# Patient Record
Sex: MALE | Race: BLACK OR AFRICAN AMERICAN | Employment: FULL TIME | URBAN - METROPOLITAN AREA
[De-identification: names, ages, dates, MRNs, and addresses within clinical notes are randomized per-mention and may not be internally consistent; named-entity substitution may affect disease eponyms.]

---

## 2017-08-15 ENCOUNTER — HOSPITAL ENCOUNTER (OUTPATIENT)
Dept: LAB | Age: 42
Discharge: HOME | End: 2017-08-15

## 2017-08-15 ENCOUNTER — OFFICE VISIT (OUTPATIENT)
Dept: FAMILY MEDICINE CLINIC | Age: 42
End: 2017-08-15

## 2017-08-15 VITALS
OXYGEN SATURATION: 96 % | DIASTOLIC BLOOD PRESSURE: 81 MMHG | TEMPERATURE: 98.3 F | HEART RATE: 76 BPM | BODY MASS INDEX: 29.48 KG/M2 | RESPIRATION RATE: 16 BRPM | WEIGHT: 222.4 LBS | HEIGHT: 73 IN | SYSTOLIC BLOOD PRESSURE: 127 MMHG

## 2017-08-15 DIAGNOSIS — Z76.89 ENCOUNTER TO ESTABLISH CARE: Primary | ICD-10-CM

## 2017-08-15 DIAGNOSIS — N52.9 ERECTILE DYSFUNCTION, UNSPECIFIED ERECTILE DYSFUNCTION TYPE: ICD-10-CM

## 2017-08-15 DIAGNOSIS — F43.29 STRESS AND ADJUSTMENT REACTION: ICD-10-CM

## 2017-08-15 DIAGNOSIS — Z72.0 TOBACCO ABUSE: ICD-10-CM

## 2017-08-15 DIAGNOSIS — Z76.89 ENCOUNTER TO ESTABLISH CARE: ICD-10-CM

## 2017-08-15 DIAGNOSIS — R06.02 SOB (SHORTNESS OF BREATH): ICD-10-CM

## 2017-08-15 DIAGNOSIS — F11.91 HISTORY OF HEROIN USE: ICD-10-CM

## 2017-08-15 PROBLEM — I10 HTN, GOAL BELOW 140/90: Status: ACTIVE | Noted: 2017-08-15

## 2017-08-15 LAB
ALBUMIN SERPL BCP-MCNC: 4 G/DL (ref 3.4–5)
ALBUMIN/GLOB SERPL: 1.1 {RATIO} (ref 0.8–1.7)
ALP SERPL-CCNC: 113 U/L (ref 45–117)
ALT SERPL-CCNC: 22 U/L (ref 16–61)
AMPHET UR QL SCN: NEGATIVE
ANION GAP BLD CALC-SCNC: 7 MMOL/L (ref 3–18)
APPEARANCE UR: CLEAR
AST SERPL W P-5'-P-CCNC: 16 U/L (ref 15–37)
BARBITURATES UR QL SCN: NEGATIVE
BASOPHILS # BLD AUTO: 0 K/UL (ref 0–0.1)
BASOPHILS # BLD: 0 % (ref 0–2)
BENZODIAZ UR QL: NEGATIVE
BILIRUB SERPL-MCNC: 0.2 MG/DL (ref 0.2–1)
BILIRUB UR QL: NEGATIVE
BUN SERPL-MCNC: 15 MG/DL (ref 7–18)
BUN/CREAT SERPL: 14 (ref 12–20)
CALCIUM SERPL-MCNC: 8.9 MG/DL (ref 8.5–10.1)
CANNABINOIDS UR QL SCN: NEGATIVE
CHLORIDE SERPL-SCNC: 101 MMOL/L (ref 100–108)
CHOLEST SERPL-MCNC: 214 MG/DL
CO2 SERPL-SCNC: 29 MMOL/L (ref 21–32)
COCAINE UR QL SCN: NEGATIVE
COLOR UR: YELLOW
CREAT SERPL-MCNC: 1.06 MG/DL (ref 0.6–1.3)
DIFFERENTIAL METHOD BLD: ABNORMAL
EOSINOPHIL # BLD: 0.6 K/UL (ref 0–0.4)
EOSINOPHIL NFR BLD: 7 % (ref 0–5)
ERYTHROCYTE [DISTWIDTH] IN BLOOD BY AUTOMATED COUNT: 14.1 % (ref 11.6–14.5)
ETHANOL SERPL-MCNC: <3 MG/DL (ref 0–3)
GLOBULIN SER CALC-MCNC: 3.6 G/DL (ref 2–4)
GLUCOSE SERPL-MCNC: 90 MG/DL (ref 74–99)
GLUCOSE UR STRIP.AUTO-MCNC: NEGATIVE MG/DL
HCT VFR BLD AUTO: 35.6 % (ref 36–48)
HDLC SERPL-MCNC: 47 MG/DL (ref 40–60)
HDLC SERPL: 4.6 {RATIO} (ref 0–5)
HDSCOM,HDSCOM: ABNORMAL
HGB BLD-MCNC: 11.6 G/DL (ref 13–16)
HGB UR QL STRIP: NEGATIVE
KETONES UR QL STRIP.AUTO: NEGATIVE MG/DL
LDLC SERPL CALC-MCNC: 112.2 MG/DL (ref 0–100)
LEUKOCYTE ESTERASE UR QL STRIP.AUTO: NEGATIVE
LIPID PROFILE,FLP: ABNORMAL
LYMPHOCYTES # BLD AUTO: 26 % (ref 21–52)
LYMPHOCYTES # BLD: 1.9 K/UL (ref 0.9–3.6)
MAGNESIUM SERPL-MCNC: 2 MG/DL (ref 1.6–2.6)
MCH RBC QN AUTO: 28 PG (ref 24–34)
MCHC RBC AUTO-ENTMCNC: 32.6 G/DL (ref 31–37)
MCV RBC AUTO: 85.8 FL (ref 74–97)
METHADONE UR QL: POSITIVE
MONOCYTES # BLD: 0.5 K/UL (ref 0.05–1.2)
MONOCYTES NFR BLD AUTO: 7 % (ref 3–10)
NEUTS SEG # BLD: 4.5 K/UL (ref 1.8–8)
NEUTS SEG NFR BLD AUTO: 60 % (ref 40–73)
NITRITE UR QL STRIP.AUTO: NEGATIVE
OPIATES UR QL: NEGATIVE
PCP UR QL: NEGATIVE
PH UR STRIP: 5.5 [PH] (ref 5–8)
PLATELET # BLD AUTO: 353 K/UL (ref 135–420)
PMV BLD AUTO: 10.4 FL (ref 9.2–11.8)
POTASSIUM SERPL-SCNC: 3.8 MMOL/L (ref 3.5–5.5)
PROT SERPL-MCNC: 7.6 G/DL (ref 6.4–8.2)
PROT UR STRIP-MCNC: NEGATIVE MG/DL
RBC # BLD AUTO: 4.15 M/UL (ref 4.7–5.5)
SODIUM SERPL-SCNC: 137 MMOL/L (ref 136–145)
SP GR UR REFRACTOMETRY: 1.01 (ref 1–1.03)
TRIGL SERPL-MCNC: 274 MG/DL (ref ?–150)
TSH SERPL DL<=0.05 MIU/L-ACNC: 2.19 UIU/ML (ref 0.36–3.74)
UROBILINOGEN UR QL STRIP.AUTO: 0.2 EU/DL (ref 0.2–1)
VLDLC SERPL CALC-MCNC: 54.8 MG/DL
WBC # BLD AUTO: 7.5 K/UL (ref 4.6–13.2)

## 2017-08-15 PROCEDURE — 87389 HIV-1 AG W/HIV-1&-2 AB AG IA: CPT | Performed by: NURSE PRACTITIONER

## 2017-08-15 PROCEDURE — 83735 ASSAY OF MAGNESIUM: CPT | Performed by: NURSE PRACTITIONER

## 2017-08-15 PROCEDURE — 80307 DRUG TEST PRSMV CHEM ANLYZR: CPT | Performed by: NURSE PRACTITIONER

## 2017-08-15 PROCEDURE — 80053 COMPREHEN METABOLIC PANEL: CPT | Performed by: NURSE PRACTITIONER

## 2017-08-15 PROCEDURE — 84443 ASSAY THYROID STIM HORMONE: CPT | Performed by: NURSE PRACTITIONER

## 2017-08-15 PROCEDURE — 85025 COMPLETE CBC W/AUTO DIFF WBC: CPT | Performed by: NURSE PRACTITIONER

## 2017-08-15 PROCEDURE — 80061 LIPID PANEL: CPT | Performed by: NURSE PRACTITIONER

## 2017-08-15 PROCEDURE — 80074 ACUTE HEPATITIS PANEL: CPT | Performed by: NURSE PRACTITIONER

## 2017-08-15 PROCEDURE — 81003 URINALYSIS AUTO W/O SCOPE: CPT | Performed by: NURSE PRACTITIONER

## 2017-08-15 RX ORDER — ALBUTEROL SULFATE 90 UG/1
2 AEROSOL, METERED RESPIRATORY (INHALATION)
Qty: 1 INHALER | Refills: 0 | Status: SHIPPED | COMMUNITY
Start: 2017-08-15 | End: 2017-12-05 | Stop reason: SDUPTHER

## 2017-08-15 NOTE — PATIENT INSTRUCTIONS
Erectile Dysfunction: Care Instructions  Your Care Instructions  A man has erectile dysfunction (ED) when he routinely can't get or keep an erection that allows satisfactory sex. He may not be able to have an erection at any time. Or he may not be able to have one that is firm enough or lasts long enough to complete intercourse. ED is not the same as having trouble getting an erection now and then. That's common. It happens to most men at some time. ED can be caused by problems with the blood vessels, nerves, or hormones. It can be caused by diabetes, heart disease, and injuries. Nerve disorders, such as multiple sclerosis or Parkinson's disease, can also cause it. ED can also be caused by medicines, alcohol, and tobacco. Or it may be caused by depression, stress, grief, or relationship problems. Follow-up care is a key part of your treatment and safety. Be sure to make and go to all appointments, and call your doctor if you are having problems. It's also a good idea to know your test results and keep a list of the medicines you take. How can you care for yourself at home? Lifestyle  · Limit alcohol. Have no more than 2 drinks a day. · Do not smoke. Smoking makes it harder for the blood vessels in the penis to relax and let blood flow in. If you need help quitting, talk to your doctor about stop-smoking programs and medicines. These can increase your chances of quitting for good. · Do not use cocaine, heroin, or other illegal drugs. · Try to reduce stress. · Give yourself time to adjust to change. Changes in your job, family, relationships, home life, and other areas can cause stress. And stress can cause erection problems. Work with your partner  · Don't assume that you know what your partner likes when it comes to sex. You may be wrong. Talk about what each of you does and does not enjoy. · Make time outside of the bedroom to talk about your sex life.  If you avoid sex because you are afraid of having erection problems, your partner may worry that you are no longer interested. · If you and your partner have trouble talking about sex, see a therapist who can help you talk about it. Reading books with your partner about sexual health may also help. · Relax. Take time for more foreplay. Worrying about your erections may only make things worse. Medicines  · Tell your doctor about all the medicines that you take. ¨ Some medicines can cause erection problems. ¨ Some medicines can have dangerous interactions with medicines that are prescribed for ED, including over-the-counter medicines and herbal products. · Be safe with medicines. Take your medicines exactly as prescribed. Call your doctor if you think you are having a problem with your medicine. · Talk to your doctor about trying a medicine to help you keep an erection. This could be a medicine such as Viagra, Levitra, or Cialis. If you have a heart problem, ask your doctor if these are safe for you. Do not take these medicines if you take nitroglycerin or other nitrate medicine. When should you call for help? Call your doctor now or seek immediate medical care if:  · You have an erection that lasts longer than 3 hours. · You took an erection-enhancing medicine (such as Cialis, Levitra, or Viagra) in the past 24 hours, and you have angina symptoms, such as chest pain or pressure. Do not take nitroglycerin. · You have erection problems along with pain or difficulty with urination, fever, or pain in the lower belly. Watch closely for changes in your health, and be sure to contact your doctor if you have any problems. Where can you learn more? Go to http://jude-kae.info/. Enter [id number] in the search box to learn more about \"Erectile Dysfunction: Care Instructions. \"  Current as of: March 14, 2017  Content Version: 11.3  © 1896-8909 [employer].  Care instructions adapted under license by [employer] (which disclaims liability or warranty for this information). If you have questions about a medical condition or this instruction, always ask your healthcare professional. [place] [address] any warranty or liability for your use of this information. The [facility] reminders! Foundation Operating Hours: These may change without notice. Mon- Wed 7am to 5pm. Closed for lunch 12-1pm  Thurs 7am to 12pm  Fridays closed     NO SHOW POLICY ~ If a patient has 3 no shows for an appointment with the Provider, Mental Health Provider, or the Nurse Navigator in 6 months, they will be discharged from the practice for 6 months. Medication ordering will also be suspended. If the patient is discharged from the [facility], they can go to the [place] [address] where they can be seen for the primary needs plus obtain the same types of medications as they receive at the [facility]. To avoid being discharged, the patient must call the office at [phone] 24 hours prior to their appointment if they need to cancel, arrive to their appointments on time and come to all scheduled appointments. If the patient is discharged from the [facility], they can apply to be re-established after 12 months. Lab work:  Unless you are instructed differently, please return to the office between the hours of 7 am and 10:30 am Monday through Thursday to have your labs drawn one week before your next scheduled PROVIDER visit. If you do not have an appointment to follow up on these results, please make one or plan to call the office if you do not hear from us to get the results. No news does not mean good news. Medications: If your medications are new or have changed, and you get your medications from the clinic pharmacy ([facility]), you MUST talk to the pharmacy staff to sign the new prescription applications. If you don't sign the applications we cannot get the medications for you. It usually takes 6-8 weeks for your medications to arrive. The Pharmacy staff will call you when your medications are available. You will have 30 days to come in and  your medications. If you don't  your medicines within those 30 days, those medicines will be placed on the self as samples and you will have to start all over again by completing the applications and waiting the 6-8 weeks for your medicines to arrive. This is firm and there will be no exceptions! ! The [facility] or [facility] will assist you with your medications if available but not all medications are available so some may be obtained through local pharmacies such as [facility] that has a large $4 list and [facility] that has many drugs for free or also for $4.  We will work hard to get the best medications for you that you can also afford. C Medication pick-up times:  Monday-Tuesday 1:00 -5:00 PM  Wednesday- Thursday 8:00 -11:30 AM      Feet Care: [facility] through [facility]  Every second Tuesday of the month (except for holidays and election days) from 9am to 1 pm. The services provided by these ministry volunteers are free of charge with the option to donate. They will inspect your feet thoroughly, soak them for 10 minutes, cut and file your nails. They care for diabetics as well. Keep in mind this service is free and will be on a first come first serve basis. Bad teeth? Ask about the Dental Bus to get you in front of a local dentist. The bus leaves every other Wednesday for those on the list. (Ask about availability as these appointments are limited)    Eye exams for Diabetics. Please let us know so we can add you to the list to see the eye doctor at [facility]. You will receive a free eye exam and free glasses if needed. Unfortunately, if you are not a diabetic, we do not have a free service for eye exams for you (yet!). We do have information on where to go to get a huge discount on eye exams and glasses. Sick visits:   If you are sick and it is not an emergency call the office to see if you can schedule an appointment. Charges and cost items from the clinic:  Most of our orders are covered by [address] but there ARE SOME CHARGES for items such as radiology interpretations and anesthesiology during procedures and surgeries. Please make sure you have contacted the [employer] Patient [employer]oc[employer] (APA) group to check on your payment options: [email or web address]. or come in and talk to them in person: On  Tuesdays, we have Advanced Patient [employer]ocacy is available Mon - Fri 8-4pm at [facility] on the first floor by the information desk. Their number is [phone]. It is important that you are screened in order to qualify for assistance and to avoid huge medical charges. The [facility] is not responsible for ANY charges you may accrue regardless of who ordered the medication, procedure, treatment or test. If you go to the Emergency Room, you WILL be charged! Behavior and emotional issues! It is stressful to be sick, have an illness, take medications, not have a job, not have medical insurance, have family issues or just getting older! Schedule an appointment with our mental health provider. She is in the office Mondays and Wednesdays from 8am to [address] [facility] can also contact the following: The national suicide hotline (4-470-803-ZWRK or 3-985.596.9354)    [address] [facility], [address] [place] [address]    Walk- in hours Monday -Wednesday   8:30 am -11:15 AM  2:15pm -4:15 pm    [employer] ([facility]) - [facility]  [address], [address]  [phone]        The [place] Department for Aging and [facility], in collaboration with community partners, provides and advocates for resources and services to improve the employment, quality of life, security, and independence of older [address], [address] with disabilities, and their families.     Department for Aging and Rehabilitative Services  Street Location: [address] [place] [address]  [place]   Voice: Aubrie [place] Jania: [phone]  E-mail: [email or web address]      Immunizations/Vaccination  Routine Immunizations are provided for infants, children, teens, and adults at the [facility]. Please bring all immunization records with you and present them at the time of registration. Phone [phone] extension [phone]  Hours (Walk in)  Monday, Tuesday, Wednesday and Friday  8:00 AM to 11:00 AM  12:30 PM to 3:00 PM    Want to Quit Smoking??? Continued tobacco use increases your risks of elevated blood pressure, vascular irritation with increased incidence of cardiovascular with stroke, heart attack, and/or peripheral vascular disease causing claudication. Smoking may also cause lung damage that could lead to COPD, cancer, and death. We encourage you to find a few healthy habits, write them down then plan to decrease your cigarette use by one each week till they are gone all together. Plan for ways to cope with stress for stress may cause you to want to restart. It is imperative to develop new coping mechanisms in order to be successful. 1-800-QUIT-NOW provided for counseling        Drug and Alcohol Addiction Issues! It is hard to stop a poor habit but there is help out there. Please feel free to attend any other the following support groups to help you kick the habit or go to [facility] Emergency Department to be evaluated by the psychiatric team. Never give up!!     AlAnon meetings: [place], [place]    [place] MONDAY 7:30 PM JUST FOR TODAY [employer] [address] 10:30 AM [place] [employer] [place] [employer], ROOM [address] [facility] [address] 8:00  Jeanette Meléndez [address] [place] [address] 8:00 PM KEEP IT SIMPLE [employer] [facility] [address] 8:00 PM LET IT BEGIN WITH ME [facility] [address] [facility] FRIDAY 6;30 PM [place] PAR[facility] [employer] [facility] [address] [facility] 236     [place] MONDAY 10:30 AM [place] [employer] [facility] SATURDAY 8:00 PM [facility] SATURDAY NIGHT AF[employer] [facility] 96 [facility] MONDAY 7:00 PM MONDAY NIGHT AFG Mayur [facility] [address] [facility] WEDNESDAY 8:00 PM [employer] [facility]

## 2017-08-15 NOTE — PROGRESS NOTES
[facility]  [address] [facility], [address]  [phone] office/[phone] fax      8/15/2017    Reason for visit:   Chief Complaint   Patient presents with   [facility]       Patient: Hugh Hunt, 1975, [id number]       Primary MD: Em Del Valle NP    Subjective:   Hugh Hunt, a 36 y.o. male, with pmhx of ADD, HTN, heroin abuse now going to methadone clinic, is right handed presents for [facility]. The patient reports he was referred to use by methadone clinic for primary care. He reports he was incarcerated for 10 years and got out about 1 year ago and is here for a checkup. Per records the patient has h/o HTN, has been off medications > 1 year. Today BP is normal. The patient reports he has made some lifestyle modifications such as limiting sodium and watching diet. Risk factors include Smoking and heroin abuse. The patient reports he \"slipped\" up and last used heroin 1 week ago. The patient also c/o SOB, reports he was using his mothers inhaler with no relief, states :it had steroids in it\". Denies h/o asthma, copd. He is currently an everyday smoker. Also reports intermittent palpitations. Denies LE swelling, LOC, weakness. Intermittent headaches reported. HPI    ADV DIR:  None      HM:   PCP: never   Eye exam: > 1 year ago   Dtap: unknown   Flu: unknown   PNA: unknown      Work status: Employed       Past Medical History:   Diagnosis Date    ADD (attention deficit disorder)     Heroin abuse     Started age 14 years, on methadone clinic     Hypertension        Past Surgical History:   Procedure Laterality Date    HX HERNIA REPAIR      surgery as infant       Social History     Social History    Marital status: SINGLE     Spouse name: N/A    Number of children: N/A    Years of education: GED     Occupational History    Not on file.      Social History Main Topics    Smoking status: Current Every Day Smoker     Packs/day: 0.50 Years: 25.00    Smokeless tobacco: Not on file    Alcohol use 0.6 oz/week     1 Cans of beer per week      Comment: rarely    Drug use: Yes     Special: Heroin    Sexual activity: Not Currently     Partners: Female     Birth control/ protection: None     Other Topics Concern    Exercise No    Seat Belt No     Social History Narrative       No Known Allergies    Current Outpatient Prescriptions on File Prior to Visit   Medication Sig Dispense Refill    multivitamin (ONE A DAY) tablet Take 1 Tab by mouth daily.  lisinopril-hydrochlorothiazide (PRINZIDE, ZESTORETIC) 20-25 mg per tablet Take 1 Tab by mouth daily.  verapamil ER (VERELAN) 180 mg CR capsule Take 180 mg by mouth daily.  hydrocortisone (CORTAID) 0.5 % topical cream Apply  to affected area two (2) times a day. use thin layer       No current facility-administered medications on file prior to visit. Review of Systems   Constitutional: Negative. HENT: Negative. Eyes: Negative. Respiratory: Positive for shortness of breath. Negative for cough, hemoptysis, sputum production and wheezing. Cardiovascular: Positive for palpitations. Negative for chest pain, orthopnea, claudication and leg swelling. Gastrointestinal: Negative. Genitourinary: Negative. Negative for dysuria, flank pain, frequency and hematuria. C/o ED and difficulty ejaculating. For the past year    Musculoskeletal: Negative. Skin: Negative. Neurological: Negative. Endo/Heme/Allergies: Negative. Psychiatric/Behavioral: Positive for substance abuse. Negative for depression, hallucinations, memory loss and suicidal ideas. The patient is not nervous/anxious and does not have insomnia. Reports stress with adjusting to being out of residential. Finances strained with taking care of 6 daughters and fiance. 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         Objective:   Visit Vitals    /81 (BP 1 Location: Right arm, BP Patient Position: Sitting)    Pulse 76    Temp 98.3 °F (36.8 °C)    Resp 16    Ht 6' 1\" (1.854 m)    Wt 222 lb 6.4 oz (100.9 kg)    SpO2 96%    BMI 29.34 kg/m2      Wt Readings from Last 3 Encounters:   08/15/17 222 lb 6.4 oz (100.9 kg)   12/14/15 217 lb (98.4 kg)     Lab Results   Component Value Date/Time    Glucose 81 03/25/2016 07:05 PM         Physical Exam   Constitutional: He is oriented to person, place, and time. He appears well-developed and well-nourished. HENT:   Head: Normocephalic. Eyes: Pupils are equal, round, and reactive to light. Neck: Normal range of motion. Neck supple. No JVD present. Carotid bruit is not present. No thyromegaly present. Cardiovascular: Normal rate and regular rhythm. No murmur heard. Pulmonary/Chest: Effort normal and breath sounds normal. No respiratory distress. He has no wheezes. Abdominal: Soft. Bowel sounds are normal. He exhibits no distension. There is no tenderness. Musculoskeletal: Normal range of motion. He exhibits no edema or deformity. Neurological: He is alert and oriented to person, place, and time. He has normal reflexes. Skin: Skin is warm and dry. Psychiatric: He has a normal mood and affect. His behavior is normal. Judgment and thought content normal.   Vitals reviewed. Assessment:    Harley Monique who has risk factors including (see above previous medical hx) and:       ICD-10-CM ICD-9-CM    1. Encounter to establish care Z76.89 V65.8 DRUG SCREEN, URINE      TSH 3RD GENERATION      LIPID PANEL      HEPATITIS PANEL, ACUTE      CBC WITH AUTOMATED DIFF      METABOLIC PANEL, COMPREHENSIVE      MAGNESIUM      HIV 1/2 AG/AB, 4TH GENERATION,W RFLX CONFIRM      ETHYL ALCOHOL      URINALYSIS W/ RFLX MICROSCOPIC      COLLECTION VENOUS BLOOD,VENIPUNCTURE   2. Tobacco abuse Z72.0 305.1 COLLECTION VENOUS BLOOD,VENIPUNCTURE      albuterol (PROVENTIL HFA, VENTOLIN HFA, PROAIR HFA) 90 mcg/actuation inhaler   3.  Erectile dysfunction, unspecified erectile dysfunction type N52.9 607.84    4. History of heroin use Z86.59 V11.8    5. Stress and adjustment reaction F43.29 309.89    6. SOB (shortness of breath) R06.02 786.05 albuterol (PROVENTIL HFA, VENTOLIN HFA, PROAIR HFA) 90 mcg/actuation inhaler      EKG, 12 LEAD, INITIAL     1. Encounter to establish care  -BP normal, will continue to monitor. Bp issues could have been related to lifestyle and illicit drug use   - DRUG SCREEN, URINE; Future  - TSH 3RD GENERATION; Future  - LIPID PANEL; Future  - HEPATITIS PANEL, ACUTE; Future  - CBC WITH AUTOMATED DIFF; Future  - METABOLIC PANEL, COMPREHENSIVE; Future  - MAGNESIUM; Future  - HIV 1/2 AG/AB, 4TH GENERATION,W RFLX CONFIRM; Future  - ETHYL ALCOHOL; Future  - URINALYSIS W/ RFLX MICROSCOPIC; Future  - COLLECTION VENOUS BLOOD,VENIPUNCTURE    2. Tobacco abuse  -Counseled patient on the dangers of tobacco use, and was advised to quit. Reviewed strategies to maximize success, including the use of Chantix. Discussed the risks of continued tobacco use such as elevated blood pressure, vascular irritation with increased incidence of CVD with stroke or MI and PVD causing claudication, lung damage that could lead to COPD, cancer and death. Encouraged an approach to find a few healthy habits, write them down then plan to decrease their cigarette use by one each week till they are gone all together and to plan for stress that may cause them to want to restart and how to prevent it by having new coping mechanisms in place. 1-800-QUIT-NOW provided for counseling   - COLLECTION VENOUS BLOOD,VENIPUNCTURE  - albuterol (PROVENTIL HFA, VENTOLIN HFA, PROAIR HFA) 90 mcg/actuation inhaler; Take 2 Puffs by inhalation every four (4) hours as needed for Shortness of Breath. Dispense: 1 Inhaler; Refill: 0    3. Erectile dysfunction, unspecified erectile dysfunction type  -Diff DX stress, HLD, illicit drug use, methadone use, metabolic   -Will await lab results     4. History of heroin use  -F/u with methadone clinic    5. Stress and adjustment reaction  -Educated pt on the importance of avoiding or reducing the amt of stress they deal with in a day. Also recommended alone time and exercise to assist with reducing stress. Encouraged pt to follow up with Yuki Live, mental health, NP for counseling to work on problems, develop coping mechanisms and have someone to leave the problems with who could help in resolving daily issues. 6. SOB (shortness of breath)  -Diff Dx asthma, copd, allergies, cardiomyopathy   - albuterol (PROVENTIL HFA, VENTOLIN HFA, PROAIR HFA) 90 mcg/actuation inhaler; Take 2 Puffs by inhalation every four (4) hours as needed for Shortness of Breath. Dispense: 1 Inhaler; Refill: 0  - EKG, 12 LEAD, INITIAL; Future      Written instructions followed our verbal discussion of all information discussed above, pending tests ordered and future goals/plans. Patient expressed understanding of current diagnosis, planned testing, follow up and if needed to contact the office for any questions or concerns prior to the next visit. Plan:   Reviewed medication and completed the medication reconciliation with the patient. Reviewed side effects of medications with the patient. Questions were answered and patient verb understanding. Pt is a 37 yo AA male. See Med  for details. Pt in the office today to establish care, medication reconciliation . Labs obtained to establish baseline, evaluate metabolic health, nutritional status, vitamin deficiencies and screening for at risk items based on the demographics of the patient, previous medical history and current social practices.  Will contact the patient in when all labs are resulted by phone to review and make lifestyle and medication recommendations. Follow up labs will be completed to monitor improvement prior to their next visit.       Orders Placed This Encounter    COLLECTION VENOUS BLOOD,VENIPUNCTURE    DRUG SCREEN, URINE     Standing Status:   Future     Standing Expiration Date:   2/14/2018    TSH 3RD GENERATION     Standing Status:   Future     Standing Expiration Date:   2/14/2018    LIPID PANEL     Standing Status:   Future     Standing Expiration Date:   8/16/2018    HEPATITIS PANEL, ACUTE     Standing Status:   Future     Standing Expiration Date:   2/14/2018    CBC WITH AUTOMATED DIFF     Standing Status:   Future     Standing Expiration Date:   7/73/0025    METABOLIC PANEL, COMPREHENSIVE     Standing Status:   Future     Standing Expiration Date:   2/14/2018    MAGNESIUM     Standing Status:   Future     Standing Expiration Date:   2/12/2018    HIV 1/2 AG/AB, 4TH GENERATION,W RFLX CONFIRM     Standing Status:   Future     Standing Expiration Date:   2/14/2018    ETHYL ALCOHOL     Standing Status:   Future     Standing Expiration Date:   2/14/2018    URINALYSIS W/ RFLX MICROSCOPIC     Standing Status:   Future     Standing Expiration Date:   2/15/2018    EKG, 12 LEAD, INITIAL     Standing Status:   Future     Standing Expiration Date:   2/12/2018     Order Specific Question:   Reason for Exam:     Answer:   c/o sob, new patient    albuterol (PROVENTIL HFA, VENTOLIN HFA, PROAIR HFA) 90 mcg/actuation inhaler     Sig: Take 2 Puffs by inhalation every four (4) hours as needed for Shortness of Breath. Dispense:  1 Inhaler     Refill:  0     Current Outpatient Prescriptions   Medication Sig Dispense Refill    albuterol (PROVENTIL HFA, VENTOLIN HFA, PROAIR HFA) 90 mcg/actuation inhaler Take 2 Puffs by inhalation every four (4) hours as needed for Shortness of Breath. 1 Inhaler 0    multivitamin (ONE A DAY) tablet Take 1 Tab by mouth daily.  lisinopril-hydrochlorothiazide (PRINZIDE, ZESTORETIC) 20-25 mg per tablet Take 1 Tab by mouth daily.  verapamil ER (VERELAN) 180 mg CR capsule Take 180 mg by mouth daily.       hydrocortisone (CORTAID) 0.5 % topical cream Apply  to affected area two (2) times a day. use thin layer         Follow-up Disposition:  Return in about 4 weeks (around 9/12/2017), or if symptoms worsen or fail to improve. See [employer] for financial assistance  Labs needed for follow-up appt     \"No Show policy was reviewed with the patient. The services affected are the nurse navigator and the provider. No show appointments include missing labs for a future scheduled appointment, Pap/pelvics, arriving to appointment more than 10 minutes late, and calling to cancel appointment less than 24 hours in advance. After the 3rd No Show, the patient will be removed from the [facility] to include medications for 6 months. The patient will be referred to the [place] [id number] for their primary care needs. \"     Daniela Alcala, ERMA, RN, [facility]    I spent 35 minutes with the patient in face-to-face consultation, of which greater than 50% was spent in counseling and coordination of care as described above.

## 2017-08-15 NOTE — MR AVS SNAPSHOT
Visit Information Date & Time Provider Department Dept. Phone Encounter #  
 8/15/2017  1:30 PM Jovan Soria NP 1997 [facility] [id number] Follow-up Instructions Return in about 4 weeks (around 9/12/2017), or if symptoms worsen or fail to improve. Your Appointments 8/23/2017  3:00 PM  
CONSULT with NURSE NAVIGATOR [address] ([facility]) Appt Note: NPO/Labs  
 340 [facility] [address]  
129 [facility] [address]  
  
    
 9/12/2017  3:00 PM  
Follow Up with Jovan Soria NP [address] [place] [address] ([facility]) Appt Note: 1 mth follow up/ No labs needed 340 [facility] [id number]  
1229 [facility] [id number] Upcoming Health Maintenance Date Due Pneumococcal 19-64 Medium Risk (1 of 1 - PPSV23) 7/4/1993 DTaP/Tdap/Td series (1 - Tdap) 7/4/1995 INFLUENZA AGE 9 TO ADULT 8/1/2017 Allergies as of 8/15/2017  Review Complete On: 8/15/2017 By: Raúl Corona RN No Known Allergies Current Immunizations  Never Reviewed No immunizations on file. Not reviewed this visit You Were Diagnosed With   
  
 Codes Comments Encounter to establish care    -  Primary ICD-10-CM: Z76.89 
ICD-9-CM: V65.8 Tobacco abuse     ICD-10-CM: Z72.0 ICD-9-CM: 305.1 Erectile dysfunction, unspecified erectile dysfunction type     ICD-10-CM: N52.9 ICD-9-CM: 607.84 History of heroin use     ICD-10-CM: Z86.59 
ICD-9-CM: V11.8 Stress and adjustment reaction     ICD-10-CM: F43.29 ICD-9-CM: 309.89   
 SOB (shortness of breath)     ICD-10-CM: R06.02 
ICD-9-CM: 786.05 Vitals BP Pulse Temp Resp Height(growth percentile) Weight(growth percentile) 127/81 (BP 1 Location: Right arm, BP Patient Position: Sitting) 76 98.3 °F (36.8 °C) 16 6' 1\" (1.854 m) 222 lb 6.4 oz (100.9 kg) SpO2 BMI Smoking Status 96% 29.34 kg/m2 Current Every Day Smoker Vitals History BMI and BSA Data Body Mass Index Body Surface Area  
 29.34 kg/m 2 2.28 m 2 Your Updated Medication List  
  
   
This list is accurate as of: 8/15/17  2:10 PM.  Always use your most recent med list.  
  
  
  
  
 albuterol 90 mcg/actuation inhaler Commonly known as:  PROVENTIL HFA, VENTOLIN HFA, PROAIR HFA Take 2 Puffs by inhalation every four (4) hours as needed for Shortness of Breath. hydrocortisone 0.5 % topical cream  
Commonly known as:  CORTAID Apply  to affected area two (2) times a day. use thin layer  
  
 lisinopril-hydroCHLOROthiazide 20-25 mg per tablet Commonly known as:  Gweneth Rasher Take 1 Tab by mouth daily. multivitamin tablet Commonly known as:  ONE A DAY Take 1 Tab by mouth daily. verapamil  mg CR capsule Commonly known as:  Frankel Coffin Take 180 mg by mouth daily. We Performed the Following COLLECTION VENOUS BLOOD,VENIPUNCTURE [id number] CPT(R)] Follow-up Instructions Return in about 4 weeks (around 9/12/2017), or if symptoms worsen or fail to improve. To-Do List   
 08/29/2017 ECG:  EKG, 12 LEAD, INITIAL Patient Instructions Erectile Dysfunction: Care Instructions Your Care Instructions A man has erectile dysfunction (ED) when he routinely can't get or keep an erection that allows satisfactory sex. He may not be able to have an erection at any time. Or he may not be able to have one that is firm enough or lasts long enough to complete intercourse. ED is not the same as having trouble getting an erection now and then. That's common. It happens to most men at some time. ED can be caused by problems with the blood vessels, nerves, or hormones. It can be caused by diabetes, heart disease, and injuries. Nerve disorders, such as multiple sclerosis or Parkinson's disease, can also cause it. ED can also be caused by medicines, alcohol, and tobacco. Or it may be caused by depression, stress, grief, or relationship problems. Follow-up care is a key part of your treatment and safety. Be sure to make and go to all appointments, and call your doctor if you are having problems. It's also a good idea to know your test results and keep a list of the medicines you take. How can you care for yourself at home? Lifestyle · Limit alcohol. Have no more than 2 drinks a day. · Do not smoke. Smoking makes it harder for the blood vessels in the penis to relax and let blood flow in. If you need help quitting, talk to your doctor about stop-smoking programs and medicines. These can increase your chances of quitting for good. · Do not use cocaine, heroin, or other illegal drugs. · Try to reduce stress. · Give yourself time to adjust to change. Changes in your job, family, relationships, home life, and other areas can cause stress. And stress can cause erection problems. Work with your partner · Don't assume that you know what your partner likes when it comes to sex. You may be wrong. Talk about what each of you does and does not enjoy. · Make time outside of the bedroom to talk about your sex life. If you avoid sex because you are afraid of having erection problems, your partner may worry that you are no longer interested. · If you and your partner have trouble talking about sex, see a therapist who can help you talk about it. Reading books with your partner about sexual health may also help. · Relax. Take time for more foreplay. Worrying about your erections may only make things worse. Medicines · Tell your doctor about all the medicines that you take. ¨ Some medicines can cause erection problems. ¨ Some medicines can have dangerous interactions with medicines that are prescribed for ED, including over-the-counter medicines and herbal products. · Be safe with medicines. Take your medicines exactly as prescribed. Call your doctor if you think you are having a problem with your medicine. · Talk to your doctor about trying a medicine to help you keep an erection. This could be a medicine such as Viagra, Levitra, or Cialis. If you have a heart problem, ask your doctor if these are safe for you. Do not take these medicines if you take nitroglycerin or other nitrate medicine. When should you call for help? Call your doctor now or seek immediate medical care if: 
· You have an erection that lasts longer than 3 hours. · You took an erection-enhancing medicine (such as Cialis, Levitra, or Viagra) in the past 24 hours, and you have angina symptoms, such as chest pain or pressure. Do not take nitroglycerin. · You have erection problems along with pain or difficulty with urination, fever, or pain in the lower belly. Watch closely for changes in your health, and be sure to contact your doctor if you have any problems. Where can you learn more? Go to http://jude-kae.info/. Enter [phone] in the search box to learn more about \"Erectile Dysfunction: Care Instructions. \" Current as of: March 14, 2017 Content Version: 11.3 © 1265-6368 [employer]. Care instructions adapted under license by [employer] (which disclaims liability or warranty for this information). If you have questions about a medical condition or this instruction, always ask your healthcare professional. [place] [address] any warranty or liability for your use of this information. [facility] reminders! Foundation Operating Hours: These may change without notice. Mon- Wed 7am to 5pm. Closed for lunch 12-1pm 
Thurs 7am to 12pm 
Fridays closed NO SHOW POLICY ~ If a patient has 3 no shows for an appointment with the Provider, Mental Health Provider, or the Nurse Navigator in 6 months, they will be discharged from the practice for 6 months. Medication ordering will also be suspended. If the patient is discharged from the [facility], they can go to the [place] [address] where they can be seen for the primary needs plus obtain the same types of medications as they receive at the [facility]. To avoid being discharged, the patient must call the office at [phone] 24 hours prior to their appointment if they need to cancel, arrive to their appointments on time and come to all scheduled appointments. If the patient is discharged from the [facility], they can apply to be re-established after 12 months. Lab work:  Unless you are instructed differently, please return to the office between the hours of 7 am and 10:30 am Monday through Thursday to have your labs drawn one week before your next scheduled PROVIDER visit. If you do not have an appointment to follow up on these results, please make one or plan to call the office if you do not hear from us to get the results. No news does not mean good news. Medications: If your medications are new or have changed, and you get your medications from the clinic pharmacy ([facility]), you MUST talk to the pharmacy staff to sign the new prescription applications. If you don't sign the applications we cannot get the medications for you. It usually takes 6-8 weeks for your medications to arrive. The Pharmacy staff will call you when your medications are available. You will have 30 days to come in and  your medications. If you don't  your medicines within those 30 days, those medicines will be placed on the self as samples and you will have to start all over again by completing the applications and waiting the 6-8 weeks for your medicines to arrive.  This is firm and there will be no exceptions!! 
 
 The [facility] or [facility] will assist you with your medications if available but not all medications are available so some may be obtained through local pharmacies such as [facility] that has a large $4 list and [facility] that has many drugs for free or also for $4.  We will work hard to get the best medications for you that you can also afford. [facility] Medication pick-up times: 
Monday-Tuesday 1:00 -5:00 PM 
Wednesday- Thursday 8:00 -11:30 AM 
 
 
Feet Care: Local [facility] through [facility] Every second Tuesday of the month (except for holidays and election days) from 9am to 1 pm. The services provided by these ministry volunteers are free of charge with the option to donate. They will inspect your feet thoroughly, soak them for 10 minutes, cut and file your nails. They care for diabetics as well. Keep in mind this service is free and will be on a first come first serve basis. Bad teeth? Ask about the Dental Bus to get you in front of a local dentist. The bus leaves every other Wednesday for those on the list. (Ask about availability as these appointments are limited) Eye exams for Diabetics. Please let us know so we can add you to the list to see the eye doctor at [facility]. You will receive a free eye exam and free glasses if needed. Unfortunately, if you are not a diabetic, we do not have a free service for eye exams for you (yet!). We do have information on where to go to get a huge discount on eye exams and glasses. Sick visits: If you are sick and it is not an emergency call the office to see if you can schedule an appointment. Charges and cost items from the clinic:  Most of our orders are covered by [address] but there ARE SOME CHARGES for items such as radiology interpretations and anesthesiology during procedures and surgeries. Please make sure you have contacted the Advanced Patient Advocacy (APA) group to check on your payment options: [email or web address]. [email or web address]. or come in and talk to them in person: On 
Tuesdays, we have Advanced Patient Advocacy is available Mon - Fri 8-4pm at [facility] on the first floor by the information desk. Their number is [phone]. It is important that you are screened in order to qualify for assistance and to avoid huge medical charges. The [facility] is not responsible for ANY charges you may accrue regardless of who ordered the medication, procedure, treatment or test. If you go to the Emergency Room, you WILL be charged! Behavior and emotional issues! It is stressful to be sick, have an illness, take medications, not have a job, not have medical insurance, have family issues or just getting older! Schedule an appointment with our mental health provider. She is in the office Mondays and Wednesdays from 8am to [place] [address] can also contact the following: The national suicide hotline (4-750-169-QHWD or 1-753.477.7105) [facility] [address] Walk- in hours Monday -Wednesday 8:30 am -11:15 AM 
2:15pm -4:15 pm 
 
[employer] (CSB) - [place] 14[address] [facility], [address] [place] [address]  
[phone] The [address] [place] [address] for Aging and The [facility], in collaboration with community partners, provides and advocates for resources and services to improve the employment, quality of life, security, and independence of older [address], [address] with disabilities, and their families. Department for Aging and Rehabilitative Services Street Location: [address] [facility] [place] Voice: [phone] Toll Free Number:[phone] Toll Free TTY: [phone] E-mail: [email or web address] Immunizations/Vaccination Routine Immunizations are provided for infants, children, teens, and adults at the [facility] FOR PHYSICAL REHABILITATION.  Please bring all immunization records with you and present them at the time of registration. Phone [phone] Hours (Walk in) Monday, Tuesday, Wednesday and Friday 8:00 AM to 11:00 AM 
12:30 PM to 3:00 PM 
 
Want to Quit Smoking??? Continued tobacco use increases your risks of elevated blood pressure, vascular irritation with increased incidence of cardiovascular with stroke, heart attack, and/or peripheral vascular disease causing claudication. Smoking may also cause lung damage that could lead to COPD, cancer, and death. We encourage you to find a few healthy habits, write them down then plan to decrease your cigarette use by one each week till they are gone all together. Plan for ways to cope with stress for stress may cause you to want to restart. It is imperative to develop new coping mechanisms in order to be successful. 1-800-QUIT-NOW provided for counseling Drug and Alcohol Addiction Issues! It is hard to stop a poor habit but there is help out there. Please feel free to attend any other the following support groups to help you kick the habit or go to [facility] Emergency Department to be evaluated by the psychiatric team. Never give up!! Karthikeyan meetings: Brandy [place], [place], [place] MONDAY 7:30 PM JUST FOR TODAY [employer] [facility] [place] [address] [facility] [place] WEDNESDAY 10:30 AM NEW BEGINN[place] [employer] [place] [employer], ROOM [address] [place] [address]  
 
[facility]EDNES[facility]:00  Jeanette Castro [facility] THURSDAY 8:00 PM KEEP IT SIMPLE [employer] May[facility] [place] [address] 8:00 PM LET IT BEGIN WITH ME [employer] [facility] 43[address] [facility] FRIDAY 6;30 PM [place] PARENTS [employer] Parents [facility] 472 DAYAN LOPEZ [facility]  Elder [place] 10:30 AM Antony Pacheco Bernadette Maradiaga [place] SATURDAY 8:00 PM GRICELDA [facility] SATURDAY NIGHT AFG [place] [address] [facility] MONDAY 7:00 PM MONDAY NIGHT AFG Al-Anon GURINDER [facility] [address]  
 
[facility] WEDNES[facility]:00 PM SERENITY SEEKERS [employer] Al-[facility] [address] [facility] & HEALTH SERVICES! [facility] introduces Stamp.it patient portal. Now you can access parts of your medical record, email your doctor's office, and request medication refills online. 1. In your internet browser, go to [email or web address]. [email or web address] 2. Click on the First Time User? Click Here link in the Sign In box. You will see the New Member Sign Up page. 3. Enter your Stamp.it Access Code exactly as it appears below. You will not need to use this code after youve completed the sign-up process. If you do not sign up before the expiration date, you must request a new code. · Stamp.it Access Code: [email or web address] Expires: 11/13/2017 12:59 PM 
 
4. Enter the last four digits of your Social Security Number (xxxx) and Date of Birth (mm/dd/yyyy) as indicated and click Submit. You will be taken to the next sign-up page. 5. Create a Stamp.it ID. This will be your Stamp.it login ID and cannot be changed, so think of one that is secure and easy to remember. 6. Create a Stamp.it password. You can change your password at any time. 7. Enter your Password Reset Question and Answer. This can be used at a later time if you forget your password. 8. Enter your e-mail address. You will receive e-mail notification when new information is available in [address] 9. Click Sign Up. You can now view and download portions of your medical record. 10. Click the Download Summary menu link to download a portable copy of your medical information.  
 
If you have questions, please visit the Frequently Asked Questions section of the [employer]. Remember, [employer] is NOT to be used for urgent needs. For medical emergencies, dial 911. Now available from your iPhone and Android! Please provide this summary of care documentation to your next provider. Your primary care clinician is listed as Leander Longo. If you have any questions after today's visit, please call [phone].

## 2017-08-15 NOTE — PROGRESS NOTES
Labs drawn on first attempt in [facility]. Three identifiers used for confirmation: name,  and last of SSN. Lab orders verified.

## 2017-08-15 NOTE — LETTER
8/15/2017 [facility] [address] [place], [address] [facility], 1975, is picking up the following medications ordered from the [facility] Program: STOCK:  MICHAEL HFA #1 Stephenie Leos Patient's Signature: _____________________________ Today's Date: 8/15/2017

## 2017-08-16 LAB
HAV IGM SERPL QL IA: NEGATIVE
HBV CORE IGM SER QL: NEGATIVE
HBV SURFACE AG SER QL: <0.1 INDEX
HBV SURFACE AG SER QL: NEGATIVE
HCV AB SER IA-ACNC: 0.12 INDEX
HCV AB SERPL QL IA: NEGATIVE
HCV COMMENT,HCGAC: NORMAL
HIV 1+2 AB+HIV1 P24 AG SERPL QL IA: NONREACTIVE
HIV12 RESULT COMMENT, HHIVC: NORMAL
SP1: NORMAL
SP2: NORMAL
SP3: NORMAL

## 2017-08-17 NOTE — PROGRESS NOTES
New patient Labs reviewed with the following interventions:  [place],   Please review at 8/23 appt  1) Cholesterol elevated at 214. ASCVD risck < 5% and not in rec statin therapy group.  Recommend diet and lifestyle modifications   2) CMP ok   3) Anemia improved from 1 year ago and stable   4) Hepatitis negative   5) HIV non reactive

## 2017-08-29 ENCOUNTER — TELEPHONE (OUTPATIENT)
Dept: FAMILY MEDICINE CLINIC | Age: 42
End: 2017-08-29

## 2017-08-29 NOTE — TELEPHONE ENCOUNTER
----- Message from Twila Linares NP sent at 8/17/2017  3:25 PM EDT -----  New patient Labs reviewed with the following interventions:  [place],   Please review at 8/23 appt  1) Cholesterol elevated at 214. ASCVD risck < 5% and not in rec statin therapy group.  Recommend diet and lifestyle modifications   2) CMP ok   3) Anemia improved from 1 year ago and stable   4) Hepatitis negative   5) HIV non reactive

## 2017-08-30 NOTE — PROGRESS NOTES
Patient came into office for results after numerous messages left on VM. Results given to patient as requested.

## 2017-09-12 ENCOUNTER — OFFICE VISIT (OUTPATIENT)
Dept: FAMILY MEDICINE CLINIC | Age: 42
End: 2017-09-12

## 2017-09-12 VITALS
HEART RATE: 60 BPM | OXYGEN SATURATION: 96 % | TEMPERATURE: 98.2 F | SYSTOLIC BLOOD PRESSURE: 144 MMHG | RESPIRATION RATE: 12 BRPM | DIASTOLIC BLOOD PRESSURE: 98 MMHG | BODY MASS INDEX: 28.73 KG/M2 | WEIGHT: 216.8 LBS | HEIGHT: 73 IN

## 2017-09-12 DIAGNOSIS — Z72.0 TOBACCO USE: ICD-10-CM

## 2017-09-12 DIAGNOSIS — N52.9 ERECTILE DYSFUNCTION, UNSPECIFIED ERECTILE DYSFUNCTION TYPE: ICD-10-CM

## 2017-09-12 DIAGNOSIS — E78.2 MIXED HYPERLIPIDEMIA: ICD-10-CM

## 2017-09-12 DIAGNOSIS — R06.02 SOB (SHORTNESS OF BREATH): ICD-10-CM

## 2017-09-12 DIAGNOSIS — R39.9 SYMPTOMS INVOLVING URINARY SYSTEM: ICD-10-CM

## 2017-09-12 DIAGNOSIS — I10 HTN, GOAL BELOW 140/90: Primary | ICD-10-CM

## 2017-09-12 DIAGNOSIS — R93.89 ABNORMAL ULTRASOUND OF PROSTATE: ICD-10-CM

## 2017-09-12 RX ORDER — AMLODIPINE BESYLATE AND ATORVASTATIN CALCIUM 5; 10 MG/1; MG/1
1 TABLET, FILM COATED ORAL DAILY
Qty: 90 TAB | Refills: 3 | Status: SHIPPED | COMMUNITY
Start: 2017-09-12 | End: 2019-04-29 | Stop reason: ALTCHOICE

## 2017-09-12 RX ORDER — AMLODIPINE BESYLATE AND ATORVASTATIN CALCIUM 5; 10 MG/1; MG/1
1 TABLET, FILM COATED ORAL DAILY
Qty: 90 TAB | Refills: 0 | Status: SHIPPED | COMMUNITY
Start: 2017-09-12 | End: 2017-10-10 | Stop reason: SDUPTHER

## 2017-09-12 NOTE — PATIENT INSTRUCTIONS
Erectile Dysfunction: Care Instructions  Your Care Instructions  A man has erectile dysfunction (ED) when he routinely can't get or keep an erection that allows satisfactory sex. He may not be able to have an erection at any time. Or he may not be able to have one that is firm enough or lasts long enough to complete intercourse. ED is not the same as having trouble getting an erection now and then. That's common. It happens to most men at some time. ED can be caused by problems with the blood vessels, nerves, or hormones. It can be caused by diabetes, heart disease, and injuries. Nerve disorders, such as multiple sclerosis or Parkinson's disease, can also cause it. ED can also be caused by medicines, alcohol, and tobacco. Or it may be caused by depression, stress, grief, or relationship problems. Follow-up care is a key part of your treatment and safety. Be sure to make and go to all appointments, and call your doctor if you are having problems. It's also a good idea to know your test results and keep a list of the medicines you take. How can you care for yourself at home? Lifestyle  · Limit alcohol. Have no more than 2 drinks a day. · Do not smoke. Smoking makes it harder for the blood vessels in the penis to relax and let blood flow in. If you need help quitting, talk to your doctor about stop-smoking programs and medicines. These can increase your chances of quitting for good. · Do not use cocaine, heroin, or other illegal drugs. · Try to reduce stress. · Give yourself time to adjust to change. Changes in your job, family, relationships, home life, and other areas can cause stress. And stress can cause erection problems. Work with your partner  · Don't assume that you know what your partner likes when it comes to sex. You may be wrong. Talk about what each of you does and does not enjoy. · Make time outside of the bedroom to talk about your sex life.  If you avoid sex because you are afraid of having erection problems, your partner may worry that you are no longer interested. · If you and your partner have trouble talking about sex, see a therapist who can help you talk about it. Reading books with your partner about sexual health may also help. · Relax. Take time for more foreplay. Worrying about your erections may only make things worse. Medicines  · Tell your doctor about all the medicines that you take. ¨ Some medicines can cause erection problems. ¨ Some medicines can have dangerous interactions with medicines that are prescribed for ED, including over-the-counter medicines and herbal products. · Be safe with medicines. Take your medicines exactly as prescribed. Call your doctor if you think you are having a problem with your medicine. · Talk to your doctor about trying a medicine to help you keep an erection. This could be a medicine such as Viagra, Levitra, or Cialis. If you have a heart problem, ask your doctor if these are safe for you. Do not take these medicines if you take nitroglycerin or other nitrate medicine. When should you call for help? Call your doctor now or seek immediate medical care if:  · You have an erection that lasts longer than 3 hours. · You took an erection-enhancing medicine (such as Cialis, Levitra, or Viagra) in the past 24 hours, and you have angina symptoms, such as chest pain or pressure. Do not take nitroglycerin. · You have erection problems along with pain or difficulty with urination, fever, or pain in the lower belly. Watch closely for changes in your health, and be sure to contact your doctor if you have any problems. Where can you learn more? Go to http://jude-kae.info/. Enter [phone] [phone] in the search box to learn more about \"Erectile Dysfunction: Care Instructions. \"  Current as of: March 14, 2017  Content Version: 11.3  © 6453-9735 [employer].  Care instructions adapted under license by [employer] (which disclaims liability or warranty for this information). If you have questions about a medical condition or this instruction, always ask your healthcare professional. [address] any warranty or liability for your use of this information.

## 2017-09-12 NOTE — MR AVS SNAPSHOT
Visit Information Date & Time Provider Department Dept. Phone Encounter #  
 9/12/2017  3:00 PM Sherlyn Sanchez NP [address] [facility] [id number] Follow-up Instructions Return in about 1 month (around 10/12/2017), or if symptoms worsen or fail to improve. Your Appointments 10/10/2017  2:00 PM  
Follow Up with Sehrlyn Sanchez NP [address] [facility] ([facility]) Appt Note: 1 month follow up/ No labs needed [address] [facility] [id number]  
[address] [facility] [id number] Upcoming Health Maintenance Date Due Pneumococcal 19-64 Medium Risk (1 of 1 - PPSV23) 7/4/1993 DTaP/Tdap/Td series (1 - Tdap) 7/4/1995 INFLUENZA AGE 9 TO ADULT 8/1/2017 Allergies as of 9/12/2017  Review Complete On: 9/12/2017 By: Sandi Ornelas No Known Allergies Current Immunizations  Never Reviewed No immunizations on file. Not reviewed this visit You Were Diagnosed With   
  
 Codes Comments HTN, goal below 140/90    -  Primary ICD-10-CM: I10 
ICD-9-CM: 401.9 Mixed hyperlipidemia     ICD-10-CM: E78.2 ICD-9-CM: 272.2 SOB (shortness of breath)     ICD-10-CM: R06.02 
ICD-9-CM: 786.05 Tobacco use     ICD-10-CM: Z72.0 ICD-9-CM: 305.1 Symptoms involving urinary system     ICD-10-CM: R39.9 ICD-9-CM: 788.99 Erectile dysfunction, unspecified erectile dysfunction type     ICD-10-CM: N52.9 ICD-9-CM: 607.84 Vitals BP Pulse Temp Resp Height(growth percentile) Weight(growth percentile) (!) 144/98 60 98.2 °F (36.8 °C) 12 6' 1\" (1.854 m) 216 lb 12.8 oz (98.3 kg) SpO2 BMI Smoking Status 96% 28.6 kg/m2 Current Every Day Smoker Vitals History BMI and BSA Data Body Mass Index Body Surface Area  
 28.6 kg/m 2 2.25 m 2 Your Updated Medication List  
  
   
 This list is accurate as of: 9/12/17  4:04 PM.  Always use your most recent med list.  
  
  
  
  
 albuterol 90 mcg/actuation inhaler Commonly known as:  PROVENTIL HFA, VENTOLIN HFA, PROAIR HFA Take 2 Puffs by inhalation every four (4) hours as needed for Shortness of Breath. * amLODIPine-atorvastatin 5-10 mg per tablet Commonly known as:  CADUET Take 1 Tab by mouth daily. * amLODIPine-atorvastatin 5-10 mg per tablet Commonly known as:  CADUET Take 1 Tab by mouth daily. hydrocortisone 0.5 % topical cream  
Commonly known as:  CORTAID Apply  to affected area two (2) times a day. use thin layer  
  
 multivitamin tablet Commonly known as:  ONE A DAY Take 1 Tab by mouth daily. * Notice: This list has 2 medication(s) that are the same as other medications prescribed for you. Read the directions carefully, and ask your doctor or other care provider to review them with you. Prescriptions Printed Refills  
 amLODIPine-atorvastatin (CADUET) 5-10 mg per tablet 3 Sig: Take 1 Tab by mouth daily. Class: Program  
 Route: Oral  
  
Prescriptions Sent to Mail Order Refills  
 amLODIPine-atorvastatin (CADUET) 5-10 mg per tablet 3 Sig: Take 1 Tab by mouth daily. Class: Program  
 Route: Oral  
  
Prescriptions Sent to Pharmacy Refills  
 amLODIPine-atorvastatin (CADUET) 5-10 mg per tablet 3 Sig: Take 1 Tab by mouth daily. Class: Program  
 Route: Oral  
  
Follow-up Instructions Return in about 1 month (around 10/12/2017), or if symptoms worsen or fail to improve. To-Do List   
 09/12/2017 PFT:  PULMONARY FUNCTION TEST   
  
 09/12/2017 Imaging:  US PELV NON OBS   
  
 09/20/2017 2:00 PM  
  Appointment with [address] [facility] 1 at [address] [place] [address] ([phone]) OUTSIDE FILMS  - Any outside films related to the study being scheduled should be brought with you on the day of the exam.  If this cannot be done there may be a delay in the reading of the study. MEDICATIONS  - Patient must bring a complete list of all medications currently taking to include prescriptions, over-the-counter meds, herbals, vitamins & any dietary supplements  GENERAL -Patient must drink 32 ounces of water 1 hour prior to the exam.  
  
 09/20/2017 3:00 PM  
  Appointment with [address] [place] [address] at [address] ([phone]) 1. Do NOT use any inhaled medications 24 hours prior to your breathing test.   2. Do NOT eat a heavy meal or smoke any tobacco products two hours prior to the appointment time. 3. Dress in loose, comfortable clothing. 4. Bring your photo ID, insurance cards and, if provided, the written physician order. 5. Report to the Patient Registration department on the first floor 15-20 minutes prior to your appointment time to check in.   6. If you have questions or need to reschedule, please call [phone]. Patient Instructions Erectile Dysfunction: Care Instructions Your Care Instructions A man has erectile dysfunction (ED) when he routinely can't get or keep an erection that allows satisfactory sex. He may not be able to have an erection at any time. Or he may not be able to have one that is firm enough or lasts long enough to complete intercourse. ED is not the same as having trouble getting an erection now and then. That's common. It happens to most men at some time. ED can be caused by problems with the blood vessels, nerves, or hormones. It can be caused by diabetes, heart disease, and injuries. Nerve disorders, such as multiple sclerosis or Parkinson's disease, can also cause it. ED can also be caused by medicines, alcohol, and tobacco. Or it may be caused by depression, stress, grief, or relationship problems. Follow-up care is a key part of your treatment and safety.  Be sure to make and go to all appointments, and call your doctor if you are having problems. It's also a good idea to know your test results and keep a list of the medicines you take. How can you care for yourself at home? Lifestyle · Limit alcohol. Have no more than 2 drinks a day. · Do not smoke. Smoking makes it harder for the blood vessels in the penis to relax and let blood flow in. If you need help quitting, talk to your doctor about stop-smoking programs and medicines. These can increase your chances of quitting for good. · Do not use cocaine, heroin, or other illegal drugs. · Try to reduce stress. · Give yourself time to adjust to change. Changes in your job, family, relationships, home life, and other areas can cause stress. And stress can cause erection problems. Work with your partner · Don't assume that you know what your partner likes when it comes to sex. You may be wrong. Talk about what each of you does and does not enjoy. · Make time outside of the bedroom to talk about your sex life. If you avoid sex because you are afraid of having erection problems, your partner may worry that you are no longer interested. · If you and your partner have trouble talking about sex, see a therapist who can help you talk about it. Reading books with your partner about sexual health may also help. · Relax. Take time for more foreplay. Worrying about your erections may only make things worse. Medicines · Tell your doctor about all the medicines that you take. ¨ Some medicines can cause erection problems. ¨ Some medicines can have dangerous interactions with medicines that are prescribed for ED, including over-the-counter medicines and herbal products. · Be safe with medicines. Take your medicines exactly as prescribed. Call your doctor if you think you are having a problem with your medicine. · Talk to your doctor about trying a medicine to help you keep an erection. This could be a medicine such as Viagra, Levitra, or Cialis.  If you have a heart problem, ask your doctor if these are safe for you. Do not take these medicines if you take nitroglycerin or other nitrate medicine. When should you call for help? Call your doctor now or seek immediate medical care if: 
· You have an erection that lasts longer than 3 hours. · You took an erection-enhancing medicine (such as Cialis, Levitra, or Viagra) in the past 24 hours, and you have angina symptoms, such as chest pain or pressure. Do not take nitroglycerin. · You have erection problems along with pain or difficulty with urination, fever, or pain in the lower belly. Watch closely for changes in your health, and be sure to contact your doctor if you have any problems. Where can you learn more? Go to http://jude-kae.info/. Enter [phone] [id number] in the search box to learn more about \"Erectile Dysfunction: Care Instructions. \" Current as of: March 14, 2017 Content Version: 11.3 © 4818-6756 [employer]. Care instructions adapted under license by "[employer]" (which disclaims liability or warranty for this information). If you have questions about a medical condition or this instruction, always ask your healthcare professional. [place] [address] any warranty or liability for your use of this information. Introducing [facility]! Nic Kapadia introduces [employer] patient portal. Now you can access parts of your medical record, email your doctor's office, and request medication refills online. 1. In your internet browser, go to [email or web address] 2. Click on the First Time User? Click Here link in the Sign In box. You will see the New Member Sign Up page. 3. Enter your [employer] Access Code exactly as it appears below. You will not need to use this code after youve completed the sign-up process. If you do not sign up before the expiration date, you must request a new code. · mSpot Access Code: [email or web address] Expires: 11/13/2017 12:59 PM 
 
4. Enter the last four digits of your Social Security Number (xxxx) and Date of Birth (mm/dd/yyyy) as indicated and click Submit. You will be taken to the next sign-up page. 5. Create a mSpot ID. This will be your mSpot login ID and cannot be changed, so think of one that is secure and easy to remember. 6. Create a mSpot password. You can change your password at any time. 7. Enter your Password Reset Question and Answer. This can be used at a later time if you forget your password. 8. Enter your e-mail address. You will receive e-mail notification when new information is available in [address] 9. Click Sign Up. You can now view and download portions of your medical record. 10. Click the Download Summary menu link to download a portable copy of your medical information. If you have questions, please visit the Frequently Asked Questions section of the mSpot website. Remember, mSpot is NOT to be used for urgent needs. For medical emergencies, dial 911. Now available from your iPhone and Android! Please provide this summary of care documentation to your next provider. Your primary care clinician is listed as Hossein Tirado. If you have any questions after today's visit, please call [phone].

## 2017-09-12 NOTE — PROGRESS NOTES
[facility]  [address] [facility], [address]  [phone] office/[phone] fax      9/12/2017    Reason for visit:   Chief Complaint   Patient presents with    Follow Up Chronic Condition       Patient: Jany Uriostegui, 1975, [id number]       Primary MD: Franny Wong NP    Subjective:   Jany Uriostegui, a 36 y.o. male, who presents for Follow Up Chronic Condition      HPI Comments: The patient has previous dx of HTN and is currently not on any medications. Was on Verapamil and Prinzide. BP is elevated today but marginally, will consider low dose BP medication. Pt with ED/BPH symptoms. Risk factors include smoking, HTN, HLD, and methadone use    Hypertension    The history is provided by the patient. This is a chronic problem. The problem has been gradually worsening. Associated symptoms include shortness of breath (Intermittently, Reports he is using albuterol inhaler almost daily). Pertinent negatives include no chest pain, no orthopnea, no palpitations, no PND, no anxiety, no confusion, no malaise/fatigue, no blurred vision, no headaches, no tinnitus, no neck pain, no peripheral edema, no dizziness, no nausea and no vomiting. There are no associated agents to hypertension. Risk factors include dyslipidemia, male gender and hypertension. Urinary Frequency    The history is provided by the patient. This is a chronic problem. The current episode started more than 1 week ago. The problem occurs intermittently. The problem has not changed since onset. The pain is at a severity of 0/10. The patient is experiencing no pain. There has been no fever. He is sexually active (Currently in a monogomous relationship. Previous urine negative for pathogens ). There is no history of pyelonephritis. Associated symptoms include frequency and hesitancy.  Pertinent negatives include no chills, no sweats, no nausea, no vomiting, no discharge, no hematuria, no urgency, no flank pain, no vaginal discharge, no penile discharge, no abdominal pain and no back pain. Associated symptoms comments: The patient reports weak urine stream and forcing urination. Describes feelings of incomplete emptying. . The patient is not pregnant. He has tried nothing for the symptoms. His past medical history does not include kidney stones, single kidney, urological procedure, recurrent UTIs, urinary stasis, catheterization or urinary catheter problem. Nocturia   The history is provided by the patient. This is a recurrent problem. The problem occurs daily (Reports he urinates 3-4 times nightly ). The problem has been gradually worsening. Associated symptoms include shortness of breath (Intermittently, Reports he is using albuterol inhaler almost daily). Pertinent negatives include no chest pain, no abdominal pain and no headaches. The symptoms are aggravated by drinking (Patient reports he drinks multiple caffenated beverages per day and he drinks water throughout the night ). Nothing relieves the symptoms. Cardiovascular Disease Follow up: The patient has hypertension and hyperlipidemia. Diet and Lifestyle: smoker of cigaretetes. Home BP Monitoring: is not measured at home. Pertinent ROS: no TIA's, no chest pain on exertion, no dyspnea on exertion, no swelling of ankles, erectile dysfunction is present. PHQ Screening  No flowsheet data found. Past Medical History:   Diagnosis Date    ADD (attention deficit disorder)     Heroin abuse     Started age 14 years, on methadone clinic     Hypertension        Past Surgical History:   Procedure Laterality Date    HX HERNIA REPAIR      surgery as infant       Social History     Social History    Marital status: SINGLE     Spouse name: N/A    Number of children: N/A    Years of education: GED     Occupational History    Not on file.      Social History Main Topics    Smoking status: Current Every Day Smoker     Packs/day: 0.50     Years: 25.00    Smokeless tobacco: Not on file    Alcohol use 0.6 oz/week     1 Cans of beer per week      Comment: rarely    Drug use: Yes     Special: Heroin    Sexual activity: Not Currently     Partners: Female     Birth control/ protection: None     Other Topics Concern    Exercise No    Seat Belt No     Social History Narrative       No Known Allergies    Current Outpatient Prescriptions on File Prior to Visit   Medication Sig Dispense Refill    albuterol (PROVENTIL HFA, VENTOLIN HFA, PROAIR HFA) 90 mcg/actuation inhaler Take 2 Puffs by inhalation every four (4) hours as needed for Shortness of Breath. 1 Inhaler 0    multivitamin (ONE A DAY) tablet Take 1 Tab by mouth daily.  hydrocortisone (CORTAID) 0.5 % topical cream Apply  to affected area two (2) times a day. use thin layer       No current facility-administered medications on file prior to visit. Review of Systems   Constitutional: Negative for chills, fever and malaise/fatigue. HENT: Negative for tinnitus. Eyes: Negative for blurred vision. Respiratory: Positive for shortness of breath (Intermittently, Reports he is using albuterol inhaler almost daily). Negative for cough, hemoptysis and sputum production. Cardiovascular: Negative. Negative for chest pain, palpitations, orthopnea and PND. Gastrointestinal: Negative. Negative for abdominal pain, nausea and vomiting. Genitourinary: Positive for frequency, hesitancy and nocturia. Negative for flank pain, hematuria, penile discharge, urgency and vaginal discharge. ED. Reports he can get an erection but has a hard time maintaining it. Reports he does have urge to have sex. Musculoskeletal: Negative. Negative for back pain and neck pain. Neurological: Negative for dizziness and headaches. Psychiatric/Behavioral: Negative for confusion.        Objective  Visit Vitals    BP (!) 144/98    Pulse 60    Temp 98.2 °F (36.8 °C)    Resp 12    Ht 6' 1\" (1.854 m)    Wt 216 lb 12.8 oz (98.3 kg)    SpO2 96%    BMI 28.6 kg/m2      Wt Readings from Last 3 Encounters:   09/12/17 216 lb 12.8 oz (98.3 kg)   08/15/17 222 lb 6.4 oz (100.9 kg)   12/14/15 217 lb (98.4 kg)     Pain Scale: 0 - No pain/10    Physical Exam   Constitutional: He is oriented to person, place, and time. He appears well-developed and well-nourished. HENT:   Head: Normocephalic. Eyes: Pupils are equal, round, and reactive to light. Neck: Normal range of motion. Neck supple. No JVD present. Carotid bruit is not present. No thyromegaly present. Cardiovascular: Normal rate and regular rhythm. No murmur heard. Pulmonary/Chest: Effort normal and breath sounds normal. No respiratory distress. He has no wheezes. Abdominal: Soft. Bowel sounds are normal. He exhibits no distension. There is no tenderness. Musculoskeletal: Normal range of motion. He exhibits no edema or deformity. Neurological: He is alert and oriented to person, place, and time. He has normal reflexes. Skin: Skin is warm and dry. Psychiatric: He has a normal mood and affect. His behavior is normal. Judgment and thought content normal.   Vitals reviewed. Labs  Displays most recent values of common labs: H&H, WBC, Platelets, ALT, AST, BUN, Creat, Na, K, TSH, HgbA1c, Lipids, INR and/or PSA. For additional labs, please use Results Review or Flowsheets.     Lab Results   Component Value Date/Time    WBC 7.5 08/15/2017 01:05 PM    HGB 11.6 08/15/2017 01:05 PM    HCT 35.6 08/15/2017 01:05 PM    PLATELET 900 44/97/4441 01:05 PM       Lab Results   Component Value Date/Time    ALT (SGPT) 22 08/15/2017 01:05 PM    AST (SGOT) 16 08/15/2017 01:05 PM    Creatinine 1.06 08/15/2017 01:05 PM    BUN 15 08/15/2017 01:05 PM    Sodium 137 08/15/2017 01:05 PM    Potassium 3.8 08/15/2017 01:05 PM       Lab Results   Component Value Date/Time    Cholesterol, total 214 08/15/2017 01:05 PM    HDL Cholesterol 47 08/15/2017 01:05 PM    LDL, calculated 112.2 08/15/2017 01:05 PM    Triglyceride 274 08/15/2017 01:05 PM    TSH 2.19 08/15/2017 01:05 PM       No results found for: PSAPOC, PSA3, PSAFLT, PSALT, PSA, PSA2, PSAR1    Assessment/Plan:    ICD-10-CM ICD-9-CM    1. HTN, goal below 140/90 I10 401.9 amLODIPine-atorvastatin (CADUET) 5-10 mg per tablet      amLODIPine-atorvastatin (CADUET) 5-10 mg per tablet   2. Mixed hyperlipidemia E78.2 272.2 amLODIPine-atorvastatin (CADUET) 5-10 mg per tablet      amLODIPine-atorvastatin (CADUET) 5-10 mg per tablet   3. SOB (shortness of breath) R06.02 786.05 PULMONARY FUNCTION TEST   4. Tobacco use Z72.0 305.1 PULMONARY FUNCTION TEST   5. Symptoms involving urinary system R39.9 788.99 US PELV NON OBS   6. Erectile dysfunction, unspecified erectile dysfunction type N52.9 607.84      Diagnoses and all orders for this visit:    1. HTN, goal below 140/90  -     amLODIPine-atorvastatin (CADUET) 5-10 mg per tablet; Take 1 Tab by mouth daily. -     amLODIPine-atorvastatin (CADUET) 5-10 mg per tablet; Take 1 Tab by mouth daily. 2. Mixed hyperlipidemia  -     amLODIPine-atorvastatin (CADUET) 5-10 mg per tablet; Take 1 Tab by mouth daily. -     amLODIPine-atorvastatin (CADUET) 5-10 mg per tablet; Take 1 Tab by mouth daily. 3. SOB (shortness of breath)  -     PULMONARY FUNCTION TEST; Future   -Continue albuterol inhaler as needed. Get PFT to assess for COPD/ Asthma    -Patient never got ECG that was ordered at last visit. 4. Tobacco use  -     PULMONARY FUNCTION TEST; Future  -Counseled patient on the dangers of tobacco use, and was advised to quit. Reviewed strategies to maximize success, including the use of Chantix. Discussed the risks of continued tobacco use such as elevated blood pressure, vascular irritation with increased incidence of CVD with stroke or MI and PVD causing claudication, lung damage that could lead to COPD, cancer and death.   Encouraged an approach to find a few healthy habits, write them down then plan to decrease their cigarette use by one each week till they are gone all together and to plan for stress that may cause them to want to restart and how to prevent it by having new coping mechanisms in place. 1-800-QUIT-NOW provided for counseling     5. Symptoms involving urinary system  -     US PELV NON OBS; Future  -If abnormal will start Cialis instead of Viagra for BPH/ED symptoms.   -The patient advised to eliminate caffeine after 7 pm and limit fluid consumption prior to bed time to prevent nocturia. 6. Erectile dysfunction, unspecified erectile dysfunction type  -Smoking cessation encouraged. The patient also coached on stress reduction.   -Educated pt that ED can be caused by drinking alcohol, using illicit drugs, hypertension, BPH, UTI, STDs, prostate cancer, prostatitis, etc. Previous urine sample  r/o UTI and STD. will have pre and post void US done of prostate to r/o BPH and other issues with the prostate. If this is normal, will treat with viagra or cialis, which ever is more appropriate. Follow-up Disposition:  Return in about 1 month (around 10/12/2017), or if symptoms worsen or fail to improve. F/u to review PFT and US. And initiate treatment for ED/BPH. Also will check BP to see how patient is responding to norvasc.   lab results and schedule of future lab studies reviewed with patient  reviewed diet, exercise and weight control  very strongly urged to quit smoking to reduce cardiovascular risk  cardiovascular risk and specific lipid/LDL goals reviewed  reviewed medications and side effects in detail    Medications Discontinued During This Encounter   Medication Reason    verapamil ER (VERELAN) 180 mg CR capsule Alternate Therapy    lisinopril-hydrochlorothiazide (PRINZIDE, ZESTORETIC) 20-25 mg per tablet Not A Current Medication       Follow-up Disposition:  Return in about 1 month (around 10/12/2017), or if symptoms worsen or fail to improve.

## 2017-09-12 NOTE — LETTER
9/12/2017 [facility] [address], [address] [facility], 1975, is picking up the following medications ordered from the [facility]: STOCK:  CADUET 5/10 MG #30 Christine Le Patient's Signature: _____________________________ Today's Date: 9/12/2017

## 2017-09-20 ENCOUNTER — HOSPITAL ENCOUNTER (OUTPATIENT)
Dept: ULTRASOUND IMAGING | Age: 42
Discharge: HOME | End: 2017-09-20
Attending: NURSE PRACTITIONER

## 2017-09-20 ENCOUNTER — HOSPITAL ENCOUNTER (OUTPATIENT)
Dept: RESPIRATORY THERAPY | Age: 42
Discharge: HOME | End: 2017-09-20
Attending: NURSE PRACTITIONER

## 2017-09-20 DIAGNOSIS — R39.9 SYMPTOMS INVOLVING URINARY SYSTEM: ICD-10-CM

## 2017-09-20 DIAGNOSIS — Z72.0 TOBACCO USE: ICD-10-CM

## 2017-09-20 DIAGNOSIS — R06.02 SOB (SHORTNESS OF BREATH): ICD-10-CM

## 2017-09-20 PROCEDURE — 94729 DIFFUSING CAPACITY: CPT

## 2017-09-20 PROCEDURE — 76857 US EXAM PELVIC LIMITED: CPT

## 2017-09-20 PROCEDURE — 94010 BREATHING CAPACITY TEST: CPT

## 2017-09-20 PROCEDURE — 94727 GAS DIL/WSHOT DETER LNG VOL: CPT

## 2017-09-21 NOTE — PROGRESS NOTES
Reviewed US results with patient via telephone. Pt to be referred to urology for further eval. Reassured patient that this could be benign. Questions and concerns addressed.

## 2017-10-10 ENCOUNTER — OFFICE VISIT (OUTPATIENT)
Dept: FAMILY MEDICINE CLINIC | Age: 42
End: 2017-10-10

## 2017-10-10 VITALS
HEART RATE: 69 BPM | HEIGHT: 73 IN | SYSTOLIC BLOOD PRESSURE: 125 MMHG | TEMPERATURE: 98.3 F | WEIGHT: 217 LBS | OXYGEN SATURATION: 95 % | DIASTOLIC BLOOD PRESSURE: 87 MMHG | BODY MASS INDEX: 28.76 KG/M2 | RESPIRATION RATE: 16 BRPM

## 2017-10-10 DIAGNOSIS — K59.03 DRUG INDUCED CONSTIPATION: ICD-10-CM

## 2017-10-10 DIAGNOSIS — I10 HTN, GOAL BELOW 140/90: Primary | ICD-10-CM

## 2017-10-10 DIAGNOSIS — Z28.21 REFUSED INFLUENZA VACCINE: ICD-10-CM

## 2017-10-10 DIAGNOSIS — Z72.0 TOBACCO ABUSE: ICD-10-CM

## 2017-10-10 DIAGNOSIS — N52.9 ERECTILE DYSFUNCTION, UNSPECIFIED ERECTILE DYSFUNCTION TYPE: ICD-10-CM

## 2017-10-10 DIAGNOSIS — K62.5 ANAL BLEEDING: ICD-10-CM

## 2017-10-10 NOTE — PATIENT INSTRUCTIONS
Fecal Occult Blood Test (FOBT): About This Test  What is it? A fecal occult blood test checks for hidden (occult) blood in the stool. You place a small sample of stool on a chemically treated card, pad, or wipe. Then a special chemical solution is put on top of the sample. If the card, pad, or wipe turns blue, there is blood in the stool sample. Why is this test done? A fecal occult blood test is done to check for colorectal cancer and other types of gastrointestinal problems. These include hemorrhoids, anal fissures, and colon polyps, which can cause blood in the stools. How can you prepare for the test?  Before you do a fecal occult blood test, avoid the following for 2 to 3 days before the test:  · Eating turnips, beets, radishes, horseradish, artichokes, mushrooms, broccoli, bean sprouts, cauliflower, apples, oranges, bananas, grapes, and melon. These foods can cause the test to be positive for blood when blood is not in the stool. · Eating red meat. This may cause false test results. Small amounts of chicken, turkey, or fish will not affect the test.  · Taking iron supplements  · Taking aspirin (or products that contain aspirin) and nonsteroidal anti-inflammatory drugs (NSAIDs). Also avoid taking other medicines that irritate the stomach or intestines. · Taking vitamin C supplements  Do not do the test during your menstrual period or if you are having bleeding from hemorrhoids. And don't test a stool sample that has been in contact with toilet bowl cleaning products that turn the water blue. What happens during the test?  You can check for blood in your stool at home. There are different types of home tests you can use. Make sure to follow the instructions that come with any test. For most tests, you will use stool samples from three different bowel movements over three different days. General instructions  For any home test, follow these guidelines:  · Check the expiration date on the package.  Do not use a test kit after its expiration date. The chemicals in the kit may not work properly after that date. · Store the test kit as instructed. Many kits need to be stored in a refrigerator or cool place. · Read all of the instructions for your test closely before doing the test. Be aware of any special things you need to do before the test. This may include not eating certain foods or limiting your physical activity. · Follow the instructions exactly. Do all the steps in order, without skipping any of them. · If a step in the test needs to be timed, use a watch. Don't guess at the timing. · If you are color-blind or have trouble seeing colors, have someone else read the test results for you. Most test results are color changes on a test strip. · Record the results of the test so you can discuss them with your doctor. Stool guaiac cards  These instructions are for one of the most common tests used to find blood in the stool. · Complete the information on the front of each card. · During a bowel movement, collect a small amount of stool on one end of an applicator. You might try catching the stool on some plastic wrap draped loosely over the toilet bowl and held in place by the toilet seat. If you use a container to collect the stool, first clean and rinse it well. This will get rid of any substance that may affect the test results. · Apply a thin smear of stool inside box A.  · Reuse the same applicator to get a second sample from a different part of the stool. Apply a thin smear inside box B.  · Close the cover of the slide. · Complete the remaining two cards in the same way for two other bowel movements. · Return all the samples right after you collect the last sample. Other test kits  · Some kits tell you to use a special cloth to wipe with after a bowel movement.  After you wipe with the cloth, you put the developer solution on it to check for a color change that means there is blood in the stool.  · Other kits have a special test pad that you place in the toilet after a bowel movement. The pad will change color if the stool has blood in it. What else should you know about the test?  A fecal occult blood test may be used to check for colorectal cancer. But it is never used to diagnose this condition. If a fecal occult blood test finds blood in the stool, you may need more tests to find the reason for the bleeding. Follow-up care is a key part of your treatment and safety. Be sure to make and go to all appointments, and call your doctor if you are having problems. It's also a good idea to keep a list of the medicines you take. Where can you learn more? Go to http://jude-kae.info/. Enter A994 in the search box to learn more about \"Fecal Occult Blood Test (FOBT): About This Test.\"  Current as of: March 16, 2017  Content Version: 11.3  © 2245-4076 [employer]. Care instructions adapted under license by [employer] (which disclaims liability or warranty for this information). If you have questions about a medical condition or this instruction, always ask your healthcare professional. [place] [address] any warranty or liability for your use of this information. Erectile Dysfunction: Care Instructions  Your Care Instructions  A man has erectile dysfunction (ED) when he routinely can't get or keep an erection that allows satisfactory sex. He may not be able to have an erection at any time. Or he may not be able to have one that is firm enough or lasts long enough to complete intercourse. ED is not the same as having trouble getting an erection now and then. That's common. It happens to most men at some time. ED can be caused by problems with the blood vessels, nerves, or hormones. It can be caused by diabetes, heart disease, and injuries. Nerve disorders, such as multiple sclerosis or Parkinson's disease, can also cause it.   ED can also be caused by medicines, alcohol, and tobacco. Or it may be caused by depression, stress, grief, or relationship problems. Follow-up care is a key part of your treatment and safety. Be sure to make and go to all appointments, and call your doctor if you are having problems. It's also a good idea to know your test results and keep a list of the medicines you take. How can you care for yourself at home? Lifestyle  · Limit alcohol. Have no more than 2 drinks a day. · Do not smoke. Smoking makes it harder for the blood vessels in the penis to relax and let blood flow in. If you need help quitting, talk to your doctor about stop-smoking programs and medicines. These can increase your chances of quitting for good. · Do not use cocaine, heroin, or other illegal drugs. · Try to reduce stress. · Give yourself time to adjust to change. Changes in your job, family, relationships, home life, and other areas can cause stress. And stress can cause erection problems. Work with your partner  · Don't assume that you know what your partner likes when it comes to sex. You may be wrong. Talk about what each of you does and does not enjoy. · Make time outside of the bedroom to talk about your sex life. If you avoid sex because you are afraid of having erection problems, your partner may worry that you are no longer interested. · If you and your partner have trouble talking about sex, see a therapist who can help you talk about it. Reading books with your partner about sexual health may also help. · Relax. Take time for more foreplay. Worrying about your erections may only make things worse. Medicines  · Tell your doctor about all the medicines that you take. ¨ Some medicines can cause erection problems. ¨ Some medicines can have dangerous interactions with medicines that are prescribed for ED, including over-the-counter medicines and herbal products. · Be safe with medicines. Take your medicines exactly as prescribed. Call your doctor if you think you are having a problem with your medicine. · Talk to your doctor about trying a medicine to help you keep an erection. This could be a medicine such as Viagra, Levitra, or Cialis. If you have a heart problem, ask your doctor if these are safe for you. Do not take these medicines if you take nitroglycerin or other nitrate medicine. When should you call for help? Call your doctor now or seek immediate medical care if:  · You have an erection that lasts longer than 3 hours. · You took an erection-enhancing medicine (such as Cialis, Levitra, or Viagra) in the past 24 hours, and you have angina symptoms, such as chest pain or pressure. Do not take nitroglycerin. · You have erection problems along with pain or difficulty with urination, fever, or pain in the lower belly. Watch closely for changes in your health, and be sure to contact your doctor if you have any problems. Where can you learn more? Go to http://jude-kae.info/. Enter [id number] in the search box to learn more about \"Erectile Dysfunction: Care Instructions. \"  Current as of: March 14, 2017  Content Version: 11.3  © 7429-1884 [employer]. Care instructions adapted under license by [employer] (which disclaims liability or warranty for this information). If you have questions about a medical condition or this instruction, always ask your healthcare professional. [place] [address] any warranty or liability for your use of this information.

## 2017-10-10 NOTE — PROGRESS NOTES
[address]  [address] [facility], [address]  [phone] office/[phone] fax      10/10/2017    Reason for visit:   Chief Complaint   Patient presents with    Follow Up Chronic Condition       Patient: Osorio Pat, 1975, [id number]       Primary MD: Alberto Mansfield NP    Subjective:   Osorio Pat, a 36 y.o. male, who presents for Follow Up Chronic Condition      Hypertension    The history is provided by the patient. This is a chronic problem. The current episode started more than 1 week ago. The problem has been gradually improving. Associated symptoms include shortness of breath. Pertinent negatives include no orthopnea, no palpitations, no PND, no anxiety, no confusion, no malaise/fatigue, no blurred vision, no tinnitus, no neck pain, no peripheral edema, no dizziness, no nausea and no vomiting. There are no associated agents to hypertension. Risk factors include hypertension, male gender and stress. Erectile Dysfunction   Pt with continued complaint of ED. Other symptoms include nocturia, frequency, intermittent hematuria intermittent going on for a few months. Denies fevers, abdominal pain, trauma. Previous urinalysis negative for infection. His Risk factors include: He is on BP medication and he is on methadone. The patient was sent for a US of his prostate. With the following impression and has been referred to Urology. The patient is eager for medication for ED. He is in a relationship and wants to be more sexually active. Pelv US  Impression:    Essentially normal appearance of the bladder. No significant post void residual.     There is a subtle area of decreased echotexture within the central prostate  gland, of uncertain clinical significance, and not further characterizable with  ultrasound. Could consider urology consultation with possible prostate MRI as  clinically warranted. If not performed, would suggest a follow-up ultrasound.      Past Medical History:   Diagnosis Date    ADD (attention deficit disorder)     Heroin abuse     Started age 14 years, on methadone clinic     Hypertension        Past Surgical History:   Procedure Laterality Date    HX HERNIA REPAIR      surgery as infant       Social History     Social History    Marital status: SINGLE     Spouse name: N/A    Number of children: N/A    Years of education: GED     Occupational History    Not on file. Social History Main Topics    Smoking status: Current Every Day Smoker     Packs/day: 0.50     Years: 25.00    Smokeless tobacco: Not on file    Alcohol use 0.6 oz/week     1 Cans of beer per week      Comment: rarely    Drug use: Yes     Special: Heroin    Sexual activity: Not Currently     Partners: Female     Birth control/ protection: None     Other Topics Concern    Exercise No    Seat Belt No     Social History Narrative       No Known Allergies    Current Outpatient Prescriptions on File Prior to Visit   Medication Sig Dispense Refill    amLODIPine-atorvastatin (CADUET) 5-10 mg per tablet Take 1 Tab by mouth daily. 90 Tab 3    albuterol (PROVENTIL HFA, VENTOLIN HFA, PROAIR HFA) 90 mcg/actuation inhaler Take 2 Puffs by inhalation every four (4) hours as needed for Shortness of Breath. 1 Inhaler 0    multivitamin (ONE A DAY) tablet Take 1 Tab by mouth daily.  hydrocortisone (CORTAID) 0.5 % topical cream Apply  to affected area two (2) times a day. use thin layer       No current facility-administered medications on file prior to visit. Review of Systems   Constitutional: Negative. Negative for chills, fever and malaise/fatigue. HENT: Negative. Negative for tinnitus. Eyes: Negative for blurred vision. Respiratory: Positive for shortness of breath. Negative for cough, hemoptysis, sputum production and wheezing. Cardiovascular: Negative. Negative for palpitations, orthopnea and PND.    Gastrointestinal: Positive for blood in stool (Pt complains of blood when wiping, Denies known Hemmorhoids. No recent PAPI, declines PAPI at the moment. ). Negative for nausea and vomiting. Genitourinary: Positive for frequency and hematuria. Negative for dysuria, flank pain and urgency. Musculoskeletal: Negative for neck pain. Skin: Negative. Neurological: Negative. Negative for dizziness. Psychiatric/Behavioral: Negative for confusion, depression, hallucinations, memory loss, substance abuse and suicidal ideas. The patient is nervous/anxious (Anxious about his health. ). The patient does not have insomnia. Objective:   Visit Vitals    /87    Pulse 69    Temp 98.3 °F (36.8 °C)    Resp 16    Ht 6' 1\" (1.854 m)    Wt 217 lb (98.4 kg)    SpO2 95%    BMI 28.63 kg/m2      Wt Readings from Last 3 Encounters:   10/10/17 217 lb (98.4 kg)   09/12/17 216 lb 12.8 oz (98.3 kg)   08/15/17 222 lb 6.4 oz (100.9 kg)     Lab Results   Component Value Date/Time    Glucose 90 08/15/2017 01:05 PM       Displays most recent values of common labs: H&H, WBC, Platelets, ALT, AST, BUN, Creat, Na, K, TSH, HgbA1c, Lipids, INR and/or PSA. For additional labs, please use Results Review or Flowsheets.     Lab Results   Component Value Date/Time    WBC 7.5 08/15/2017 01:05 PM    HGB 11.6 08/15/2017 01:05 PM    HCT 35.6 08/15/2017 01:05 PM    PLATELET 109 56/07/4106 01:05 PM       Lab Results   Component Value Date/Time    ALT (SGPT) 22 08/15/2017 01:05 PM    AST (SGOT) 16 08/15/2017 01:05 PM    Creatinine 1.06 08/15/2017 01:05 PM    BUN 15 08/15/2017 01:05 PM    Sodium 137 08/15/2017 01:05 PM    Potassium 3.8 08/15/2017 01:05 PM       Lab Results   Component Value Date/Time    Cholesterol, total 214 08/15/2017 01:05 PM    HDL Cholesterol 47 08/15/2017 01:05 PM    LDL, calculated 112.2 08/15/2017 01:05 PM    Triglyceride 274 08/15/2017 01:05 PM    TSH 2.19 08/15/2017 01:05 PM       No results found for: PSAPOC, PSA3, PSAFLT, PSALT, PSA, PSA2, PSAR1    Physical Exam        ICD-10-CM ICD-9-CM    1. HTN, goal below 140/90 I10 401.9    2. Tobacco abuse Z72.0 305.1    3. Refused influenza vaccine Z28.21 V64.06    4. Drug induced constipation K59.03 564.09 linaclotide (LINZESS) 145 mcg cap capsule     E980.5 linaclotide (LINZESS) 145 mcg cap capsule   5. Anal bleeding K62.5 569.3 OCCULT BLOOD, STOOL   6. Erectile dysfunction, unspecified erectile dysfunction type N52.9 607.84      Diagnoses and all orders for this visit:    1. HTN, goal below 140/90  -The current medical regimen is effective;  continue present plan and medications. 2. Tobacco abuse  -The patient was counseled on the dangers of tobacco use, and was advised to quit. Reviewed strategies to maximize success, including removing cigarettes and smoking materials from environment, stress management, substitution of other forms of reinforcement, support of family/friends and written materials. 3. Refused influenza vaccine    4. Drug induced constipation  -     linaclotide (LINZESS) 145 mcg cap capsule; Take 1 Cap by mouth Daily (before breakfast). -     linaclotide (LINZESS) 145 mcg cap capsule; Take 1 Cap by mouth Daily (before breakfast). 5. Anal bleeding  -     OCCULT BLOOD, STOOL; Future  - Diff Dx Hemmrhoids, ulcer, Diverticular disease, Proctitis, abscess, UC, Polyps, Colon CA  -Patient decines PAPI at the moment. Discussed that we will proceed with stool for occult and if possitive he will need to definitely come back in for examination. Also, discussed, if related to prostate, Dr. Anupam Meredith may address it at his appt if he feels more comfortable with a male provider. 6. Erectile dysfunction, unspecified erectile dysfunction type  -Awaiting f/u with urology.      Follow-up Disposition:  Return in about 3 months (around 1/10/2018), or if symptoms worsen or fail to improve.     lab results and schedule of future lab studies reviewed with patient  reviewed diet, exercise and weight control  very strongly urged to quit smoking to reduce cardiovascular risk  cardiovascular risk and specific lipid/LDL goals reviewed  reviewed medications and side effects in detail  radiology results and schedule of future radiology studies reviewed with patient

## 2017-10-10 NOTE — LETTER
10/10/2017 [facility] [address], [address] [facility], 1975, is picking up the following medications ordered from the [facility]: STOCK:  WANDA [address] [facility] [address] uEnice [place] Patient's Signature: _____________________________ Today's Date: 10/10/2017

## 2017-10-10 NOTE — MR AVS SNAPSHOT
Visit Information Date & Time Provider Department Dept. Phone Encounter #  
 10/10/2017 11:00 AM Yonis Aguilar NP [address] [facility] [id number] Follow-up Instructions Return in about 3 months (around 1/10/2018), or if symptoms worsen or fail to improve. Your Appointments 10/23/2017  1:30 PM  
New Patient with Sherice Nam MD  
[facility]) Appt Note: ED/Mailed paperwork [address] [address]  
[phone] [address] [facility] [address] [phone]  
  
    
 1/8/2018 11:00 AM  
Follow Up with Yonis Aguilar NP [address] [facility] ([facility]) Appt Note: 3 mth follow up  
 [address] [facility] [id number]  
[address] [facility] [id number] Upcoming Health Maintenance Date Due Pneumococcal 19-64 Medium Risk (1 of 1 - PPSV23) 7/4/1993 DTaP/Tdap/Td series (1 - Tdap) 7/4/1995 INFLUENZA AGE 9 TO ADULT 8/1/2017 Allergies as of 10/10/2017  Review Complete On: 10/10/2017 By: Felisa Ho No Known Allergies Current Immunizations  Never Reviewed No immunizations on file. Not reviewed this visit You Were Diagnosed With   
  
 Codes Comments HTN, goal below 140/90    -  Primary ICD-10-CM: I10 
ICD-9-CM: 401.9 Tobacco abuse     ICD-10-CM: Z72.0 ICD-9-CM: 305.1 Refused influenza vaccine     ICD-10-CM: Z28.21 ICD-9-CM: V64.06 Drug induced constipation     ICD-10-CM: K59.03 
ICD-9-CM: 564.09, E980.5 Anal bleeding     ICD-10-CM: K62.5 ICD-9-CM: 569.3 Erectile dysfunction, unspecified erectile dysfunction type     ICD-10-CM: N52.9 ICD-9-CM: 607.84 Vitals BP Pulse Temp Resp Height(growth percentile) Weight(growth percentile) 125/87 69 98.3 °F (36.8 °C) 16 6' 1\" (1.854 m) 217 lb (98.4 kg) SpO2 BMI Smoking Status 95% 28.63 kg/m2 Current Every Day Smoker BMI and BSA Data Body Mass Index Body Surface Area  
 28.63 kg/m 2 2.25 m 2 Your Updated Medication List  
  
   
This list is accurate as of: 10/10/17 11:36 AM.  Always use your most recent med list.  
  
  
  
  
 albuterol 90 mcg/actuation inhaler Commonly known as:  PROVENTIL HFA, VENTOLIN HFA, PROAIR HFA Take 2 Puffs by inhalation every four (4) hours as needed for Shortness of Breath. amLODIPine-atorvastatin 5-10 mg per tablet Commonly known as:  CADUET Take 1 Tab by mouth daily. hydrocortisone 0.5 % topical cream  
Commonly known as:  CORTAID Apply  to affected area two (2) times a day. use thin layer * linaclotide 145 mcg Cap capsule Commonly known as:  Enrique Fresh Take 1 Cap by mouth Daily (before breakfast). * linaclotide 145 mcg Cap capsule Commonly known as:  Enrique Fresh Take 1 Cap by mouth Daily (before breakfast). multivitamin tablet Commonly known as:  ONE A DAY Take 1 Tab by mouth daily. * Notice: This list has 2 medication(s) that are the same as other medications prescribed for you. Read the directions carefully, and ask your doctor or other care provider to review them with you. Prescriptions Printed Refills  
 linaclotide (LINZESS) 145 mcg cap capsule 3 Sig: Take 1 Cap by mouth Daily (before breakfast). Class: Program  
 Route: Oral  
  
Prescriptions Sent to Mail Order Refills  
 linaclotide (LINZESS) 145 mcg cap capsule 3 Sig: Take 1 Cap by mouth Daily (before breakfast). Class: Program  
 Route: Oral  
  
Prescriptions Sent to Pharmacy Refills  
 linaclotide (LINZESS) 145 mcg cap capsule 3 Sig: Take 1 Cap by mouth Daily (before breakfast). Class: Program  
 Route: Oral  
  
Follow-up Instructions Return in about 3 months (around 1/10/2018), or if symptoms worsen or fail to improve. To-Do List   
 10/10/2017 Lab:  OCCULT BLOOD, STOOL Patient Instructions Fecal Occult Blood Test (FOBT): About This Test 
What is it? A fecal occult blood test checks for hidden (occult) blood in the stool. You place a small sample of stool on a chemically treated card, pad, or wipe. Then a special chemical solution is put on top of the sample. If the card, pad, or wipe turns blue, there is blood in the stool sample. Why is this test done? A fecal occult blood test is done to check for colorectal cancer and other types of gastrointestinal problems. These include hemorrhoids, anal fissures, and colon polyps, which can cause blood in the stools. How can you prepare for the test? 
Before you do a fecal occult blood test, avoid the following for 2 to 3 days before the test: 
· Eating turnips, beets, radishes, horseradish, artichokes, mushrooms, broccoli, bean sprouts, cauliflower, apples, oranges, bananas, grapes, and melon. These foods can cause the test to be positive for blood when blood is not in the stool. · Eating red meat. This may cause false test results. Small amounts of chicken, turkey, or fish will not affect the test. 
· Taking iron supplements · Taking aspirin (or products that contain aspirin) and nonsteroidal anti-inflammatory drugs (NSAIDs). Also avoid taking other medicines that irritate the stomach or intestines. · Taking vitamin C supplements Do not do the test during your menstrual period or if you are having bleeding from hemorrhoids. And don't test a stool sample that has been in contact with toilet bowl cleaning products that turn the water blue. What happens during the test? 
You can check for blood in your stool at home. There are different types of home tests you can use.  Make sure to follow the instructions that come with any test. For most tests, you will use stool samples from three different bowel movements over three different days. General instructions For any home test, follow these guidelines: 
· Check the expiration date on the package. Do not use a test kit after its expiration date. The chemicals in the kit may not work properly after that date. · Store the test kit as instructed. Many kits need to be stored in a refrigerator or cool place. · Read all of the instructions for your test closely before doing the test. Be aware of any special things you need to do before the test. This may include not eating certain foods or limiting your physical activity. · Follow the instructions exactly. Do all the steps in order, without skipping any of them. · If a step in the test needs to be timed, use a watch. Don't guess at the timing. · If you are color-blind or have trouble seeing colors, have someone else read the test results for you. Most test results are color changes on a test strip. · Record the results of the test so you can discuss them with your doctor. Stool guaiac cards These instructions are for one of the most common tests used to find blood in the stool. · Complete the information on the front of each card. · During a bowel movement, collect a small amount of stool on one end of an applicator. You might try catching the stool on some plastic wrap draped loosely over the toilet bowl and held in place by the toilet seat. If you use a container to collect the stool, first clean and rinse it well. This will get rid of any substance that may affect the test results. · Apply a thin smear of stool inside box A. 
· Reuse the same applicator to get a second sample from a different part of the stool. Apply a thin smear inside box B. 
· Close the cover of the slide. · Complete the remaining two cards in the same way for two other bowel movements. · Return all the samples right after you collect the last sample. Other test kits · Some kits tell you to use a special cloth to wipe with after a bowel movement. After you wipe with the cloth, you put the developer solution on it to check for a color change that means there is blood in the stool. · Other kits have a special test pad that you place in the toilet after a bowel movement. The pad will change color if the stool has blood in it. What else should you know about the test? 
A fecal occult blood test may be used to check for colorectal cancer. But it is never used to diagnose this condition. If a fecal occult blood test finds blood in the stool, you may need more tests to find the reason for the bleeding. Follow-up care is a key part of your treatment and safety. Be sure to make and go to all appointments, and call your doctor if you are having problems. It's also a good idea to keep a list of the medicines you take. Where can you learn more? Go to http://jude-kae.info/. Enter T110 in the search box to learn more about \"Fecal Occult Blood Test (FOBT): About This Test.\" Current as of: March 16, 2017 Content Version: 11.3 © 9324-7175 [employer]. Care instructions adapted under license by [employer] (which disclaims liability or warranty for this information). If you have questions about a medical condition or this instruction, always ask your healthcare professional. [place] [address] any warranty or liability for your use of this information. Erectile Dysfunction: Care Instructions Your Care Instructions A man has erectile dysfunction (ED) when he routinely can't get or keep an erection that allows satisfactory sex. He may not be able to have an erection at any time. Or he may not be able to have one that is firm enough or lasts long enough to complete intercourse. ED is not the same as having trouble getting an erection now and then. That's common. It happens to most men at some time. ED can be caused by problems with the blood vessels, nerves, or hormones. It can be caused by diabetes, heart disease, and injuries. Nerve disorders, such as multiple sclerosis or Parkinson's disease, can also cause it. ED can also be caused by medicines, alcohol, and tobacco. Or it may be caused by depression, stress, grief, or relationship problems. Follow-up care is a key part of your treatment and safety. Be sure to make and go to all appointments, and call your doctor if you are having problems. It's also a good idea to know your test results and keep a list of the medicines you take. How can you care for yourself at home? Lifestyle · Limit alcohol. Have no more than 2 drinks a day. · Do not smoke. Smoking makes it harder for the blood vessels in the penis to relax and let blood flow in. If you need help quitting, talk to your doctor about stop-smoking programs and medicines. These can increase your chances of quitting for good. · Do not use cocaine, heroin, or other illegal drugs. · Try to reduce stress. · Give yourself time to adjust to change. Changes in your job, family, relationships, home life, and other areas can cause stress. And stress can cause erection problems. Work with your partner · Don't assume that you know what your partner likes when it comes to sex. You may be wrong. Talk about what each of you does and does not enjoy. · Make time outside of the bedroom to talk about your sex life. If you avoid sex because you are afraid of having erection problems, your partner may worry that you are no longer interested. · If you and your partner have trouble talking about sex, see a therapist who can help you talk about it. Reading books with your partner about sexual health may also help. · Relax. Take time for more foreplay.  Worrying about your erections may only make things worse. Medicines · Tell your doctor about all the medicines that you take. ¨ Some medicines can cause erection problems. ¨ Some medicines can have dangerous interactions with medicines that are prescribed for ED, including over-the-counter medicines and herbal products. · Be safe with medicines. Take your medicines exactly as prescribed. Call your doctor if you think you are having a problem with your medicine. · Talk to your doctor about trying a medicine to help you keep an erection. This could be a medicine such as Viagra, Levitra, or Cialis. If you have a heart problem, ask your doctor if these are safe for you. Do not take these medicines if you take nitroglycerin or other nitrate medicine. When should you call for help? Call your doctor now or seek immediate medical care if: 
· You have an erection that lasts longer than 3 hours. · You took an erection-enhancing medicine (such as Cialis, Levitra, or Viagra) in the past 24 hours, and you have angina symptoms, such as chest pain or pressure. Do not take nitroglycerin. · You have erection problems along with pain or difficulty with urination, fever, or pain in the lower belly. Watch closely for changes in your health, and be sure to contact your doctor if you have any problems. Where can you learn more? Go to http://jude-kae.info/. Enter [id number] in the search box to learn more about \"Erectile Dysfunction: Care Instructions. \" Current as of: March 14, 2017 Content Version: 11.3 © 7779-2117 Healthwise, Incorporated. Care instructions adapted under license by [employer] (which disclaims liability or warranty for this information). If you have questions about a medical condition or this instruction, always ask your healthcare professional. [place] [address] any warranty or liability for your use of this information. Introducing [facility]! Marcello Gerber introduces Pluto.TV patient portal. Now you can access parts of your medical record, email your doctor's office, and request medication refills online. 1. In your internet browser, go to [email or web address] 2. Click on the First Time User? Click Here link in the Sign In box. You will see the New Member Sign Up page. 3. Enter your Pluto.TV Access Code exactly as it appears below. You will not need to use this code after youve completed the sign-up process. If you do not sign up before the expiration date, you must request a new code. · Pluto.TV Access Code: [email or web address] Expires: 11/13/2017 12:59 PM 
 
4. Enter the last four digits of your Social Security Number (xxxx) and Date of Birth (mm/dd/yyyy) as indicated and click Submit. You will be taken to the next sign-up page. 5. Create a Pluto.TV ID. This will be your Pluto.TV login ID and cannot be changed, so think of one that is secure and easy to remember. 6. Create a Pluto.TV password. You can change your password at any time. 7. Enter your Password Reset Question and Answer. This can be used at a later time if you forget your password. 8. Enter your e-mail address. You will receive e-mail notification when new information is available in [address] 9. Click Sign Up. You can now view and download portions of your medical record. 10. Click the Download Summary menu link to download a portable copy of your medical information. If you have questions, please visit the Frequently Asked Questions section of the Pluto.TV website. Remember, Pluto.TV is NOT to be used for urgent needs. For medical emergencies, dial 911. Now available from your iPhone and Android! Please provide this summary of care documentation to your next provider. Your primary care clinician is listed as [facility] Tomas[facility]. If you have any questions after today's visit, please call [phone].

## 2017-10-23 ENCOUNTER — OFFICE VISIT (OUTPATIENT)
Dept: UROLOGY | Age: 42
End: 2017-10-23

## 2017-10-23 VITALS
HEIGHT: 73 IN | SYSTOLIC BLOOD PRESSURE: 114 MMHG | WEIGHT: 222 LBS | HEART RATE: 91 BPM | OXYGEN SATURATION: 93 % | BODY MASS INDEX: 29.42 KG/M2 | DIASTOLIC BLOOD PRESSURE: 76 MMHG

## 2017-10-23 DIAGNOSIS — N52.9 ERECTILE DYSFUNCTION, UNSPECIFIED ERECTILE DYSFUNCTION TYPE: Primary | ICD-10-CM

## 2017-10-23 DIAGNOSIS — F11.11 HISTORY OF HEROIN ABUSE (HCC): ICD-10-CM

## 2017-10-23 LAB
BILIRUB UR QL STRIP: NEGATIVE
GLUCOSE UR-MCNC: NEGATIVE MG/DL
KETONES P FAST UR STRIP-MCNC: NORMAL MG/DL
PH UR STRIP: 5.5 [PH] (ref 4.6–8)
PROT UR QL STRIP: NEGATIVE MG/DL
SP GR UR STRIP: 1.01 (ref 1–1.03)
UA UROBILINOGEN AMB POC: NORMAL (ref 0.2–1)
URINALYSIS CLARITY POC: CLEAR
URINALYSIS COLOR POC: YELLOW
URINE BLOOD POC: NEGATIVE
URINE LEUKOCYTES POC: NEGATIVE
URINE NITRITES POC: NEGATIVE

## 2017-10-23 RX ORDER — TADALAFIL 20 MG/1
20 TABLET ORAL AS NEEDED
Qty: 10 TAB | Refills: 11 | Status: SHIPPED | OUTPATIENT
Start: 2017-10-23 | End: 2018-01-15 | Stop reason: SDUPTHER

## 2017-10-23 NOTE — PROGRESS NOTES
Chief Complaint   Patient presents with    New Patient    Erectile Dysfunction       HISTORY OF PRESENT ILLNESS:  Tino Aleman is a 36 y.o. male who presents today with complaints of erectile dysfunction gradually worsening and going back at least a year. He has no other systemic illnesses that might cause that. His personal life on the other hand is in my opinion what is creating the biggest issues. 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 He has recently gotten out of long term for 10 years about 2 years ago. He is working, his girlfriend has 3 children and he has 3 children and while they have known each other and have strong feelings for each other he is having to move out this week. Most of his issues relate to money. Based on that, in my opinion, there is small reason to wonder why he has much of erectile dysfunction. ROS documented on the chart see that for details. Past Medical History:   Diagnosis Date    ADD (attention deficit disorder)     Depression     Heroin abuse     Started age 14 years, on methadone clinic     Hypertension        Past Surgical History:   Procedure Laterality Date    HX HERNIA REPAIR      surgery as infant       Social History   Substance Use Topics    Smoking status: Current Every Day Smoker     Packs/day: 0.50     Years: 25.00    Smokeless tobacco: Never Used    Alcohol use 0.6 oz/week     1 Cans of beer per week      Comment: rarely       No Known Allergies    Family History   Problem Relation Age of Onset    Depression Mother    Suki Gomez Migraines Mother     Diabetes Mother    Suki Patricia Arthritis-osteo Mother     Alcohol abuse Father     Hypertension Father     High Cholesterol Father     Arthritis-osteo Father     Alzheimer Maternal Grandmother     Eczema Maternal Grandmother        Current Outpatient Prescriptions   Medication Sig Dispense Refill    METHADONE HCL (METHADONE PO) Take 85 mg by mouth daily.       tadalafil (CIALIS) 20 mg tablet Take 1 Tab by mouth as needed. 10 Tab 11    linaclotide (LINZESS) 145 mcg cap capsule Take 1 Cap by mouth Daily (before breakfast). 90 Cap 3    amLODIPine-atorvastatin (CADUET) 5-10 mg per tablet Take 1 Tab by mouth daily. 90 Tab 3    albuterol (PROVENTIL HFA, VENTOLIN HFA, PROAIR HFA) 90 mcg/actuation inhaler Take 2 Puffs by inhalation every four (4) hours as needed for Shortness of Breath. 1 Inhaler 0    multivitamin (ONE A DAY) tablet Take 1 Tab by mouth daily.  hydrocortisone (CORTAID) 0.5 % topical cream Apply  to affected area two (2) times a day. use thin layer           PHYSICAL EXAMINATION:   Visit Vitals    /76 (BP 1 Location: Left arm, BP Patient Position: Sitting)    Pulse 91    Ht 6' 1\" (1.854 m)    Wt 222 lb (100.7 kg)    SpO2 93%    BMI 29.29 kg/m2     Constitutional: WDWN, Pleasant and appropriate affect, No acute distress. CV:  No peripheral swelling noted  Respiratory: No respiratory distress or difficulties  Abdomen:  No abdominal masses or tenderness. No CVA tenderness. No inguinal hernias noted.  Male:    PAPI: Deferred today. SCROTUM:  No scrotal rash or lesions noticed. Normal bilateral testes and epididymis. Numerous scrotal subcutaneous sebaceous cysts. No testicular atrophy. PENIS: Urethral meatus normal in location and size. No urethral discharge. Skin: No jaundice. Neuro/Psych:  Alert and oriented x 3, affect appropriate. Lymphatic:   No enlarged inguinal lymph nodes.          Results for orders placed or performed in visit on 10/23/17   AMB POC URINALYSIS DIP STICK AUTO W/O MICRO   Result Value Ref Range    Color (UA POC) Yellow     Clarity (UA POC) Clear     Glucose (UA POC) Negative Negative    Bilirubin (UA POC) Negative Negative    Ketones (UA POC) Trace Negative    Specific gravity (UA POC) 1.015 1.001 - 1.035    Blood (UA POC) Negative Negative    pH (UA POC) 5.5 4.6 - 8.0    Protein (UA POC) Negative Negative mg/dL Urobilinogen (UA POC) 0.2 mg/dL 0.2 - 1    Nitrites (UA POC) Negative Negative    Leukocyte esterase (UA POC) Negative Negative         REVIEW OF LABS AND IMAGING:     Imaging Report Reviewed? NO     Images Reviewed? NO           Other Lab Data Reviewed? YES         ASSESSMENT:     ICD-10-CM ICD-9-CM    1. Erectile dysfunction, unspecified erectile dysfunction type N52.9 607.84 AMB POC URINALYSIS DIP STICK AUTO W/O MICRO   2. History of heroin abuse Z87.898 305.53             PLAN / DISCUSSION: : I had a lengthy and ibeth talk with him about the psychiatric issues that are he is confronted with, namely heroin and drug abuse including now the methadone clinic route, the fact that he is breaking up with his girlfriend of a number of years, the fact that he is having to move out, and the fact that he has no insurance and is struggling to pay his bills and find rent money. I think in all all of this is enough to cause him to be able to not have erections. However, I did offer him the use of Viagra or Cialis and we did decide on Cialis. I will write him a prescription for that and have him return in 6 months. The patient expresses understanding and agreement of the discussion and plan. Maritza Genao MD on 10/23/2017         Please note: This document has been produced using voice recognition software. Unrecognized errors in transcription may be present.

## 2017-10-23 NOTE — MR AVS SNAPSHOT
Visit Information Date & Time Provider Department Dept. Phone Encounter #  
 10/23/2017 10:30 AM Ericka Arceo, Deng [facility] [phone] [id number] Your Appointments 1/8/2018 11:00 AM  
Follow Up with Deloris Agarwal NP [address] [facility] ([address]) Appt Note: 3 mth follow up  
 [address] [facility] [address]  
[address] [facility] [address]  
  
    
 4/9/2018  9:30 AM  
Office Visit with MD NABILA Bradshaw [facility] [address]) Appt Note: check up [address] [place] A [address] [address] [phone] [address] [facility] [address] [facility] [address] Upcoming Health Maintenance Date Due Pneumococcal 19-64 Medium Risk (1 of 1 - PPSV23) 7/4/1993 DTaP/Tdap/Td series (1 - Tdap) 7/4/1995 INFLUENZA AGE 9 TO ADULT 8/1/2017 Allergies as of 10/23/2017  Review Complete On: 10/23/2017 By: Chasity Echeverria LPN No Known Allergies Current Immunizations  Never Reviewed No immunizations on file. Not reviewed this visit You Were Diagnosed With   
  
 Codes Comments Erectile dysfunction, unspecified erectile dysfunction type    -  Primary ICD-10-CM: N52.9 ICD-9-CM: 607.84 Vitals BP Pulse Height(growth percentile) Weight(growth percentile) SpO2 BMI  
 114/76 (BP 1 Location: Left arm, BP Patient Position: Sitting) 91 6' 1\" (1.854 m) 222 lb (100.7 kg) 93% 29.29 kg/m2 Smoking Status Current Every Day Smoker Vitals History BMI and BSA Data Body Mass Index Body Surface Area  
 29.29 kg/m 2 2.28 m 2 Preferred Pharmacy Pharmacy Name Phone [facility] [address] [phone] Your Updated Medication List  
  
   
 This list is accurate as of: 10/23/17 11:23 AM.  Always use your most recent med list.  
  
  
  
  
 albuterol 90 mcg/actuation inhaler Commonly known as:  PROVENTIL HFA, VENTOLIN HFA, PROAIR HFA Take 2 Puffs by inhalation every four (4) hours as needed for Shortness of Breath. amLODIPine-atorvastatin 5-10 mg per tablet Commonly known as:  CADUET Take 1 Tab by mouth daily. hydrocortisone 0.5 % topical cream  
Commonly known as:  CORTAID Apply  to affected area two (2) times a day. use thin layer  
  
 linaclotide 145 mcg Cap capsule Commonly known as:  Nata Leaven Take 1 Cap by mouth Daily (before breakfast). METHADONE PO Take 85 mg by mouth daily. multivitamin tablet Commonly known as:  ONE A DAY Take 1 Tab by mouth daily. tadalafil 20 mg tablet Commonly known as:  CIALIS Take 1 Tab by mouth as needed. Prescriptions Printed Refills  
 tadalafil (CIALIS) 20 mg tablet 11 Sig: Take 1 Tab by mouth as needed. Class: Print Route: Oral  
  
We Performed the Following AMB POC URINALYSIS DIP STICK AUTO W/O MICRO [33108 CPT(R)] Patient Instructions Erectile Dysfunction: Care Instructions Your Care Instructions A man has erectile dysfunction (ED) when he routinely can't get or keep an erection that allows satisfactory sex. He may not be able to have an erection at any time. Or he may not be able to have one that is firm enough or lasts long enough to complete intercourse. ED is not the same as having trouble getting an erection now and then. That's common. It happens to most men at some time. ED can be caused by problems with the blood vessels, nerves, or hormones. It can be caused by diabetes, heart disease, and injuries. Nerve disorders, such as multiple sclerosis or Parkinson's disease, can also cause it.  
ED can also be caused by medicines, alcohol, and tobacco. Or it may be caused by depression, stress, grief, or relationship problems. Follow-up care is a key part of your treatment and safety. Be sure to make and go to all appointments, and call your doctor if you are having problems. It's also a good idea to know your test results and keep a list of the medicines you take. How can you care for yourself at home? Lifestyle · Limit alcohol. Have no more than 2 drinks a day. · Do not smoke. Smoking makes it harder for the blood vessels in the penis to relax and let blood flow in. If you need help quitting, talk to your doctor about stop-smoking programs and medicines. These can increase your chances of quitting for good. · Do not use cocaine, heroin, or other illegal drugs. · Try to reduce stress. · Give yourself time to adjust to change. Changes in your job, family, relationships, home life, and other areas can cause stress. And stress can cause erection problems. Work with your partner · Don't assume that you know what your partner likes when it comes to sex. You may be wrong. Talk about what each of you does and does not enjoy. · Make time outside of the bedroom to talk about your sex life. If you avoid sex because you are afraid of having erection problems, your partner may worry that you are no longer interested. · If you and your partner have trouble talking about sex, see a therapist who can help you talk about it. Reading books with your partner about sexual health may also help. · Relax. Take time for more foreplay. Worrying about your erections may only make things worse. Medicines · Tell your doctor about all the medicines that you take. ¨ Some medicines can cause erection problems. ¨ Some medicines can have dangerous interactions with medicines that are prescribed for ED, including over-the-counter medicines and herbal products. · Be safe with medicines. Take your medicines exactly as prescribed.  Call your doctor if you think you are having a problem with your medicine. · Talk to your doctor about trying a medicine to help you keep an erection. This could be a medicine such as Viagra, Levitra, or Cialis. If you have a heart problem, ask your doctor if these are safe for you. Do not take these medicines if you take nitroglycerin or other nitrate medicine. When should you call for help? Call your doctor now or seek immediate medical care if: 
· You have an erection that lasts longer than 3 hours. · You took an erection-enhancing medicine (such as Cialis, Levitra, or Viagra) in the past 24 hours, and you have angina symptoms, such as chest pain or pressure. Do not take nitroglycerin. · You have erection problems along with pain or difficulty with urination, fever, or pain in the lower belly. Watch closely for changes in your health, and be sure to contact your doctor if you have any problems. Where can you learn more? Go to http://jude-kae.info/. Enter [id number] in the search box to learn more about \"Erectile Dysfunction: Care Instructions. \" Current as of: March 14, 2017 Content Version: 11.3 © 5632-3816 [employer]. Care instructions adapted under license by [employer] (which disclaims liability or warranty for this information). If you have questions about a medical condition or this instruction, always ask your healthcare professional. [place] [address] any warranty or liability for your use of this information. Introducing [facility] & [facility]! [facility] introduces Play[employer] patient portal. Now you can access parts of your medical record, email your doctor's office, and request medication refills online. 1. In your internet browser, go to https://"[employer]. [place]/"[employer] 2. Click on the First Time User? Click Here link in the Sign In box. You will see the New Member Sign Up page. 3. Enter your Scuttledog Access Code exactly as it appears below. You will not need to use this code after youve completed the sign-up process. If you do not sign up before the expiration date, you must request a new code. · Scuttledog Access Code: [email or web address] Expires: 11/13/2017 12:59 PM 
 
4. Enter the last four digits of your Social Security Number (xxxx) and Date of Birth (mm/dd/yyyy) as indicated and click Submit. You will be taken to the next sign-up page. 5. Create a Scuttledog ID. This will be your Scuttledog login ID and cannot be changed, so think of one that is secure and easy to remember. 6. Create a Scuttledog password. You can change your password at any time. 7. Enter your Password Reset Question and Answer. This can be used at a later time if you forget your password. 8. Enter your e-mail address. You will receive e-mail notification when new information is available in [address] 9. Click Sign Up. You can now view and download portions of your medical record. 10. Click the Download Summary menu link to download a portable copy of your medical information. If you have questions, please visit the Frequently Asked Questions section of the Scuttledog website. Remember, Scuttledog is NOT to be used for urgent needs. For medical emergencies, dial 911. Now available from your iPhone and Android! Please provide this summary of care documentation to your next provider. Your primary care clinician is listed as Flaquito Alexander. If you have any questions after today's visit, please call [phone].

## 2017-10-23 NOTE — LETTER
NOTIFICATION RETURN TO WORK / SCHOOL 
 
10/23/2017 11:23 AM 
 
Mr. Fadi Mari [address] [facility]  [address] To Whom It May Concern: 
 
Fadi Mari is currently under the care of [facility]. Please excuse  from October 20-October 23 returning back to work on October 24. If there are questions or concerns please have the patient contact our office. Sincerely, Katrin Pappas MD

## 2017-10-23 NOTE — PATIENT INSTRUCTIONS
Erectile Dysfunction: Care Instructions  Your Care Instructions  A man has erectile dysfunction (ED) when he routinely can't get or keep an erection that allows satisfactory sex. He may not be able to have an erection at any time. Or he may not be able to have one that is firm enough or lasts long enough to complete intercourse. ED is not the same as having trouble getting an erection now and then. That's common. It happens to most men at some time. ED can be caused by problems with the blood vessels, nerves, or hormones. It can be caused by diabetes, heart disease, and injuries. Nerve disorders, such as multiple sclerosis or Parkinson's disease, can also cause it. ED can also be caused by medicines, alcohol, and tobacco. Or it may be caused by depression, stress, grief, or relationship problems. Follow-up care is a key part of your treatment and safety. Be sure to make and go to all appointments, and call your doctor if you are having problems. It's also a good idea to know your test results and keep a list of the medicines you take. How can you care for yourself at home? Lifestyle  · Limit alcohol. Have no more than 2 drinks a day. · Do not smoke. Smoking makes it harder for the blood vessels in the penis to relax and let blood flow in. If you need help quitting, talk to your doctor about stop-smoking programs and medicines. These can increase your chances of quitting for good. · Do not use cocaine, heroin, or other illegal drugs. · Try to reduce stress. · Give yourself time to adjust to change. Changes in your job, family, relationships, home life, and other areas can cause stress. And stress can cause erection problems. Work with your partner  · Don't assume that you know what your partner likes when it comes to sex. You may be wrong. Talk about what each of you does and does not enjoy. · Make time outside of the bedroom to talk about your sex life.  If you avoid sex because you are afraid of having erection problems, your partner may worry that you are no longer interested. · If you and your partner have trouble talking about sex, see a therapist who can help you talk about it. Reading books with your partner about sexual health may also help. · Relax. Take time for more foreplay. Worrying about your erections may only make things worse. Medicines  · Tell your doctor about all the medicines that you take. ¨ Some medicines can cause erection problems. ¨ Some medicines can have dangerous interactions with medicines that are prescribed for ED, including over-the-counter medicines and herbal products. · Be safe with medicines. Take your medicines exactly as prescribed. Call your doctor if you think you are having a problem with your medicine. · Talk to your doctor about trying a medicine to help you keep an erection. This could be a medicine such as Viagra, Levitra, or Cialis. If you have a heart problem, ask your doctor if these are safe for you. Do not take these medicines if you take nitroglycerin or other nitrate medicine. When should you call for help? Call your doctor now or seek immediate medical care if:  · You have an erection that lasts longer than 3 hours. · You took an erection-enhancing medicine (such as Cialis, Levitra, or Viagra) in the past 24 hours, and you have angina symptoms, such as chest pain or pressure. Do not take nitroglycerin. · You have erection problems along with pain or difficulty with urination, fever, or pain in the lower belly. Watch closely for changes in your health, and be sure to contact your doctor if you have any problems. Where can you learn more? Go to http://jude-kae.info/. Enter [phone] in the search box to learn more about \"Erectile Dysfunction: Care Instructions. \"  Current as of: March 14, 2017  Content Version: 11.3  © 3066-6157 [employer].  Care instructions adapted under license by [employer] (which disclaims liability or warranty for this information). If you have questions about a medical condition or this instruction, always ask your healthcare professional. [address] any warranty or liability for your use of this information.

## 2017-10-23 NOTE — PROGRESS NOTES
Mr. Dunn Half has a reminder for a \"due or due soon\" health maintenance. I have asked that he contact his primary care provider for follow-up on this health maintenance.

## 2017-11-01 ENCOUNTER — TELEPHONE (OUTPATIENT)
Dept: FAMILY MEDICINE CLINIC | Age: 42
End: 2017-11-01

## 2017-11-02 NOTE — TELEPHONE ENCOUNTER
Medication: Linzess , dose: 145mg, how often: qd , current number of medication days provided: 90, refill per application. Lot #: [id number], EXP 01/2019. This medication was received and verified for the following 1. Correct Patient, 2. Correct Diagnosis, 3. Correct Drug, 4. Correct route, and no current allergy to medication. Please contact patient to come  their medications.      ERMA Tracy, RN, FNP-C     [facility]

## 2017-12-05 DIAGNOSIS — Z72.0 TOBACCO ABUSE: ICD-10-CM

## 2017-12-05 DIAGNOSIS — R06.02 SOB (SHORTNESS OF BREATH): ICD-10-CM

## 2017-12-05 RX ORDER — ALBUTEROL SULFATE 90 UG/1
2 AEROSOL, METERED RESPIRATORY (INHALATION)
Qty: 3 INHALER | Refills: 3 | Status: SHIPPED | COMMUNITY
Start: 2017-12-05 | End: 2019-04-29 | Stop reason: SDUPTHER

## 2017-12-21 DIAGNOSIS — I10 HTN, GOAL BELOW 140/90: Primary | ICD-10-CM

## 2018-01-03 ENCOUNTER — TELEPHONE (OUTPATIENT)
Dept: FAMILY MEDICINE CLINIC | Age: 43
End: 2018-01-03

## 2018-01-03 NOTE — TELEPHONE ENCOUNTER
Medication: Ventolin HFA, dose: 2 puffs, how often: every 4 hours as needed, current number of medication days provided: 90, refill per application. Lot #: [id number], EXP 01/2019. This medication was received and verified for the following 1. Correct Patient, 2. Correct Diagnosis, 3. Correct Drug, 4. Correct route, and no current allergy to medication. Please contact patient to come  their medications.      ERMA Isabel, RN, P-C     [facility]

## 2018-01-10 ENCOUNTER — LAB ONLY (OUTPATIENT)
Dept: FAMILY MEDICINE CLINIC | Age: 43
End: 2018-01-10

## 2018-01-10 ENCOUNTER — HOSPITAL ENCOUNTER (OUTPATIENT)
Dept: LAB | Age: 43
Discharge: HOME | End: 2018-01-10

## 2018-01-10 DIAGNOSIS — I10 HTN, GOAL BELOW 140/90: Primary | ICD-10-CM

## 2018-01-10 DIAGNOSIS — I10 HTN, GOAL BELOW 140/90: ICD-10-CM

## 2018-01-10 LAB
ALBUMIN SERPL-MCNC: 3.5 G/DL (ref 3.4–5)
ALBUMIN/GLOB SERPL: 1 {RATIO} (ref 0.8–1.7)
ALP SERPL-CCNC: 98 U/L (ref 45–117)
ALT SERPL-CCNC: 31 U/L (ref 16–61)
ANION GAP SERPL CALC-SCNC: 10 MMOL/L (ref 3–18)
AST SERPL-CCNC: 19 U/L (ref 15–37)
BILIRUB SERPL-MCNC: 0.2 MG/DL (ref 0.2–1)
BUN SERPL-MCNC: 8 MG/DL (ref 7–18)
BUN/CREAT SERPL: 8 (ref 12–20)
CALCIUM SERPL-MCNC: 9.2 MG/DL (ref 8.5–10.1)
CHLORIDE SERPL-SCNC: 102 MMOL/L (ref 100–108)
CHOLEST SERPL-MCNC: 180 MG/DL
CO2 SERPL-SCNC: 27 MMOL/L (ref 21–32)
CREAT SERPL-MCNC: 0.95 MG/DL (ref 0.6–1.3)
GLOBULIN SER CALC-MCNC: 3.5 G/DL (ref 2–4)
GLUCOSE SERPL-MCNC: 118 MG/DL (ref 74–99)
HDLC SERPL-MCNC: 55 MG/DL (ref 40–60)
HDLC SERPL: 3.3 {RATIO} (ref 0–5)
LDLC SERPL CALC-MCNC: 86.8 MG/DL (ref 0–100)
LIPID PROFILE,FLP: ABNORMAL
POTASSIUM SERPL-SCNC: 4.2 MMOL/L (ref 3.5–5.5)
PROT SERPL-MCNC: 7 G/DL (ref 6.4–8.2)
SODIUM SERPL-SCNC: 139 MMOL/L (ref 136–145)
TRIGL SERPL-MCNC: 191 MG/DL (ref ?–150)
VLDLC SERPL CALC-MCNC: 38.2 MG/DL

## 2018-01-10 PROCEDURE — 80061 LIPID PANEL: CPT | Performed by: NURSE PRACTITIONER

## 2018-01-10 PROCEDURE — 80053 COMPREHEN METABOLIC PANEL: CPT | Performed by: NURSE PRACTITIONER

## 2018-01-15 ENCOUNTER — OFFICE VISIT (OUTPATIENT)
Dept: FAMILY MEDICINE CLINIC | Age: 43
End: 2018-01-15

## 2018-01-15 VITALS
HEIGHT: 73 IN | RESPIRATION RATE: 12 BRPM | HEART RATE: 81 BPM | WEIGHT: 225.4 LBS | TEMPERATURE: 98.2 F | BODY MASS INDEX: 29.87 KG/M2 | DIASTOLIC BLOOD PRESSURE: 90 MMHG | SYSTOLIC BLOOD PRESSURE: 136 MMHG | OXYGEN SATURATION: 98 %

## 2018-01-15 DIAGNOSIS — R06.02 SOB (SHORTNESS OF BREATH): ICD-10-CM

## 2018-01-15 DIAGNOSIS — I10 HTN, GOAL BELOW 140/90: Primary | ICD-10-CM

## 2018-01-15 DIAGNOSIS — F17.210 CIGARETTE SMOKER: ICD-10-CM

## 2018-01-15 DIAGNOSIS — N52.9 ERECTILE DYSFUNCTION, UNSPECIFIED ERECTILE DYSFUNCTION TYPE: ICD-10-CM

## 2018-01-15 RX ORDER — AMLODIPINE BESYLATE AND ATORVASTATIN CALCIUM 5; 10 MG/1; MG/1
1 TABLET, FILM COATED ORAL DAILY
Qty: 30 TAB | Refills: 0 | Status: SHIPPED | COMMUNITY
Start: 2018-01-15 | End: 2018-04-25 | Stop reason: SDUPTHER

## 2018-01-15 RX ORDER — TADALAFIL 20 MG/1
20 TABLET ORAL AS NEEDED
Qty: 30 TAB | Refills: 3 | Status: SHIPPED | COMMUNITY
Start: 2018-01-15 | End: 2019-04-29 | Stop reason: SDUPTHER

## 2018-01-15 RX ORDER — FLUTICASONE PROPIONATE AND SALMETEROL 100; 50 UG/1; UG/1
1 POWDER RESPIRATORY (INHALATION) EVERY 12 HOURS
Qty: 1 INHALER | Refills: 0 | Status: SHIPPED | COMMUNITY
Start: 2018-01-15 | End: 2018-02-14

## 2018-01-15 RX ORDER — FLUTICASONE PROPIONATE AND SALMETEROL 100; 50 UG/1; UG/1
1 POWDER RESPIRATORY (INHALATION) EVERY 12 HOURS
Qty: 3 INHALER | Refills: 0 | Status: SHIPPED | COMMUNITY
Start: 2018-01-15 | End: 2019-06-10 | Stop reason: SDUPTHER

## 2018-01-31 ENCOUNTER — TELEPHONE (OUTPATIENT)
Dept: FAMILY MEDICINE CLINIC | Age: 43
End: 2018-01-31

## 2018-01-31 NOTE — TELEPHONE ENCOUNTER
Medication: Advair 100/50 mcg, dose: 1 inhalation, how often: twice daily, current number of medication days provided: 90, refill per application. Lot #: [id number], EXP 01/2019. This medication was received and verified for the following 1. Correct Patient, 2. Correct Diagnosis, 3. Correct Drug, 4. Correct route, and no current allergy to medication. Please contact patient to come  their medications.      Nya Villalta, MSN, RN, [facility]

## 2018-02-08 ENCOUNTER — TELEPHONE (OUTPATIENT)
Dept: FAMILY MEDICINE CLINIC | Age: 43
End: 2018-02-08

## 2018-02-08 NOTE — TELEPHONE ENCOUNTER
Medication: Caduet 5-10 mg, dose: 1 tab, how often: daily, current number of medication days provided: 90, refill per application. Lot #:  # [id number], EXP 09/2019. This medication was received and verified for the following 1. Correct Patient, 2. Correct Diagnosis, 3. Correct Drug, 4. Correct route, and no current allergy to medication. Please contact patient to come  their medications.      Marquise Nugent MSN, RN, FNP-C     [facility]

## 2018-03-05 ENCOUNTER — TELEPHONE (OUTPATIENT)
Dept: FAMILY MEDICINE CLINIC | Age: 43
End: 2018-03-05

## 2018-03-06 NOTE — TELEPHONE ENCOUNTER
Medication: cialis 20 mg , dose: 1tab, how often: qd , current number of medication days provided: 90, refill per application. Lot #: [id number], EXP 06/2020. This medication was received and verified for the following 1. Correct Patient, 2. Correct Diagnosis, 3. Correct Drug, 4. Correct route, and no current allergy to medication. Please contact patient to come  their medications.      ERMA Stubbs, RN, [facility]

## 2018-03-08 ENCOUNTER — TELEPHONE (OUTPATIENT)
Dept: FAMILY MEDICINE CLINIC | Age: 43
End: 2018-03-08

## 2018-03-08 NOTE — TELEPHONE ENCOUNTER
Medication: Linzess , dose: 145mg, how often: qd , current number of medication days provided: 90, refill per application. Lot #: [id number], EXP 06/2019. This medication was received and verified for the following 1. Correct Patient, 2. Correct Diagnosis, 3. Correct Drug, 4. Correct route, and no current allergy to medication. Please contact patient to come  their medications.      ERMA Perez, RN, [facility]

## 2018-03-12 ENCOUNTER — TELEPHONE (OUTPATIENT)
Dept: FAMILY MEDICINE CLINIC | Age: 43
End: 2018-03-12

## 2018-03-12 DIAGNOSIS — R19.8 SYMPTOMS OF GASTROESOPHAGEAL REFLUX: Primary | ICD-10-CM

## 2018-03-12 RX ORDER — DEXLANSOPRAZOLE 30 MG/1
30 CAPSULE, DELAYED RELEASE ORAL
Qty: 90 CAP | Refills: 3 | Status: SHIPPED | COMMUNITY
Start: 2018-03-12 | End: 2019-06-10 | Stop reason: SINTOL

## 2018-03-26 ENCOUNTER — TELEPHONE (OUTPATIENT)
Dept: FAMILY MEDICINE CLINIC | Age: 43
End: 2018-03-26

## 2018-03-26 NOTE — TELEPHONE ENCOUNTER
Medication: Ventolin HFA, dose: 2 puffs, how often: every 4 hours as needed, current number of medication days provided: 90, refill per application. Lot #: [id number], EXP 03/2019. This medication was received and verified for the following 1. Correct Patient, 2. Correct Diagnosis, 3. Correct Drug, 4. Correct route, and no current allergy to medication. Please contact patient to come  their medications.      Marly Hickey, MSN, RN, FNP-C     [facility]

## 2018-04-14 ENCOUNTER — HOSPITAL ENCOUNTER (EMERGENCY)
Age: 43
Discharge: HOME | End: 2018-04-14
Attending: EMERGENCY MEDICINE
Payer: SELF-PAY

## 2018-04-14 VITALS
HEART RATE: 89 BPM | SYSTOLIC BLOOD PRESSURE: 151 MMHG | DIASTOLIC BLOOD PRESSURE: 100 MMHG | RESPIRATION RATE: 18 BRPM | TEMPERATURE: 98.5 F | WEIGHT: 225 LBS | OXYGEN SATURATION: 97 % | BODY MASS INDEX: 29.69 KG/M2

## 2018-04-14 DIAGNOSIS — I10 ESSENTIAL HYPERTENSION: ICD-10-CM

## 2018-04-14 DIAGNOSIS — R09.81 SINUS CONGESTION: Primary | ICD-10-CM

## 2018-04-14 PROCEDURE — 99282 EMERGENCY DEPT VISIT SF MDM: CPT

## 2018-04-14 RX ORDER — FLUTICASONE PROPIONATE 50 MCG
2 SPRAY, SUSPENSION (ML) NASAL DAILY
Qty: 1 BOTTLE | Refills: 0 | Status: SHIPPED | OUTPATIENT
Start: 2018-04-14 | End: 2020-03-07

## 2018-04-14 NOTE — ED PROVIDER NOTES
EMERGENCY DEPARTMENT HISTORY AND PHYSICAL EXAM    Date: 4/14/2018  Patient Name: Lubna Khanna    History of Presenting Illness     Chief Complaint   Patient presents with    Nasal Congestion         History Provided By: Patient    Chief Complaint: sinus congestion  Duration: 2 Weeks  Timing:  Acute and Intermittent  Location: maxillary and ethmoidal  Quality: Aching and Pressure  Severity: Moderate  Modifying Factors: worse at night  Associated Symptoms: denies any other associated signs or symptoms      Additional History (Context): Lubna Khanna is a 36 y.o. male with hypertension, asthma and ADHD; heroin abuse who presents with sinus congestion for the past few weeks, worse at night. Denies fever. No meds tried pta for relief. Worried b/c he feels he becomes SOB. PCP: Felicitas Fitch NP    Current Outpatient Prescriptions   Medication Sig Dispense Refill    dextromethorphan-guaiFENesin (CORICIDIN HBP)  mg cap Take 2 Tabs by mouth every six (6) hours as needed for up to 5 days. 40 Cap 0    fluticasone (FLONASE) 50 mcg/actuation nasal spray 2 Sprays by Both Nostrils route daily. 1 Bottle 0    dexlansoprazole (DEXILANT) 30 mg capsule Take 1 Cap by mouth daily as needed. Indications: gastroesophageal reflux disease 90 Cap 3    tadalafil (CIALIS) 20 mg tablet Take 1 Tab by mouth as needed. Indications: Erectile Dysfunction 30 Tab 3    amLODIPine-atorvastatin (CADUET) 5-10 mg per tablet Take 1 Tab by mouth daily. 30 Tab 0    fluticasone-salmeterol (ADVAIR) 100-50 mcg/dose diskus inhaler Take 1 Puff by inhalation every twelve (12) hours. 3 Inhaler 0    albuterol (PROVENTIL HFA, VENTOLIN HFA, PROAIR HFA) 90 mcg/actuation inhaler Take 2 Puffs by inhalation every four (4) hours as needed for Shortness of Breath. 3 Inhaler 3    METHADONE HCL (METHADONE PO) Take 85 mg by mouth daily.  linaclotide (LINZESS) 145 mcg cap capsule Take 1 Cap by mouth Daily (before breakfast).  90 Cap 3    amLODIPine-atorvastatin (CADUET) 5-10 mg per tablet Take 1 Tab by mouth daily. 90 Tab 3    multivitamin (ONE A DAY) tablet Take 1 Tab by mouth daily.  hydrocortisone (CORTAID) 0.5 % topical cream Apply  to affected area two (2) times a day. use thin layer         Past History     Past Medical History:  Past Medical History:   Diagnosis Date    ADD (attention deficit disorder)     Depression     Heroin abuse     Started age 14 years, on methadone clinic     Hypertension        Past Surgical History:  Past Surgical History:   Procedure Laterality Date    HX HERNIA REPAIR      surgery as infant       Family History:  Family History   Problem Relation Age of Onset    Depression Mother    Tracey Humphreys Migraines Mother     Diabetes Mother    Tracey Humphreys Arthritis-osteo Mother     Alcohol abuse Father     Hypertension Father     High Cholesterol Father     Arthritis-osteo Father     Alzheimer Maternal Grandmother     Eczema Maternal Grandmother        Social History:  Social History   Substance Use Topics    Smoking status: Current Every Day Smoker     Packs/day: 0.50     Years: 25.00    Smokeless tobacco: Never Used    Alcohol use 0.6 oz/week     1 Cans of beer per week      Comment: rarely       Allergies:  No Known Allergies      Review of Systems   Review of Systems   Constitutional: Negative for fever. HENT: Positive for postnasal drip, sinus pain and sinus pressure. Negative for rhinorrhea and sore throat. Respiratory: Negative for cough. All other systems reviewed and are negative. All Other Systems Negative  Physical Exam     Vitals:    04/14/18 0404 04/14/18 0406   BP:  (!) 151/100   Pulse: 89    Resp: 18    Temp: 98.5 °F (36.9 °C)    SpO2: 97% 97%   Weight: 102.1 kg (225 lb)      Physical Exam   Constitutional: Vital signs are normal. He appears well-developed and well-nourished. He is active. Non-toxic appearance. He does not appear ill. No distress. HENT:   Head: Normocephalic and atraumatic. Bilateral diminished nasal patency   Neck: Normal range of motion. Neck supple. Carotid bruit is not present. No tracheal deviation present. No thyromegaly present. Cardiovascular: Normal rate, regular rhythm and normal heart sounds. Exam reveals no gallop and no friction rub. No murmur heard. Pulmonary/Chest: Effort normal and breath sounds normal. No stridor. No respiratory distress. He has no wheezes. He has no rales. He exhibits no tenderness. Abdominal: Soft. He exhibits no distension and no mass. There is no tenderness. There is no rebound, no guarding and no CVA tenderness. Musculoskeletal: Normal range of motion. Neurological: He is alert. Skin: Skin is warm, dry and intact. He is not diaphoretic. No pallor. Psychiatric: He has a normal mood and affect. His speech is normal and behavior is normal. Judgment and thought content normal.   Nursing note and vitals reviewed. Diagnostic Study Results     Labs -   No results found for this or any previous visit (from the past 12 hour(s)). Radiologic Studies -   No orders to display     CT Results  (Last 48 hours)    None        CXR Results  (Last 48 hours)    None            Medical Decision Making   I am the first provider for this patient. I reviewed the vital signs, available nursing notes, past medical history, past surgical history, family history and social history. Vital Signs-Reviewed the patient's vital signs. Records Reviewed: Nursing Notes    Procedures:  Procedures    Provider Notes (Medical Decision Making): URI; nasal congestion; sinus congestion    Treat symptoms; discussed w/pt julieta pot as well. MED RECONCILIATION:  No current facility-administered medications for this encounter. Current Outpatient Prescriptions   Medication Sig    dextromethorphan-guaiFENesin (CORICIDIN HBP)  mg cap Take 2 Tabs by mouth every six (6) hours as needed for up to 5 days.     fluticasone (FLONASE) 50 mcg/actuation nasal spray 2 Sprays by Both Nostrils route daily.  dexlansoprazole (DEXILANT) 30 mg capsule Take 1 Cap by mouth daily as needed. Indications: gastroesophageal reflux disease    tadalafil (CIALIS) 20 mg tablet Take 1 Tab by mouth as needed. Indications: Erectile Dysfunction    amLODIPine-atorvastatin (CADUET) 5-10 mg per tablet Take 1 Tab by mouth daily.  fluticasone-salmeterol (ADVAIR) 100-50 mcg/dose diskus inhaler Take 1 Puff by inhalation every twelve (12) hours.  albuterol (PROVENTIL HFA, VENTOLIN HFA, PROAIR HFA) 90 mcg/actuation inhaler Take 2 Puffs by inhalation every four (4) hours as needed for Shortness of Breath.  METHADONE HCL (METHADONE PO) Take 85 mg by mouth daily.  linaclotide (LINZESS) 145 mcg cap capsule Take 1 Cap by mouth Daily (before breakfast).  amLODIPine-atorvastatin (CADUET) 5-10 mg per tablet Take 1 Tab by mouth daily.  multivitamin (ONE A DAY) tablet Take 1 Tab by mouth daily.  hydrocortisone (CORTAID) 0.5 % topical cream Apply  to affected area two (2) times a day. use thin layer       Disposition:  home    DISCHARGE NOTE:   4:24 AM    Pt has been reexamined. Patient has no new complaints, changes, or physical findings. Care plan outlined and precautions discussed. Results of exam were reviewed with the patient. All medications were reviewed with the patient; will d/c home with coricidin, fluticasone. All of pt's questions and concerns were addressed. Patient was instructed and agrees to follow up with PCP, as well as to return to the ED upon further deterioration. Patient is ready to go home.     Follow-up Information     Follow up With Details Comments [address] Sánchez NP Schedule an appointment as soon as possible for a visit in 2 days  [facility] [address] [facility] EMERGENCY DEPT  If symptoms worsen return immediately [address] [place]  [phone]          Current Discharge Medication List      START taking these medications    Details   dextromethorphan-guaiFENesin (CORICIDIN HBP)  mg cap Take 2 Tabs by mouth every six (6) hours as needed for up to 5 days. Qty: 40 Cap, Refills: 0      fluticasone (FLONASE) 50 mcg/actuation nasal spray 2 Sprays by Both Nostrils route daily. Qty: 1 Bottle, Refills: 0               Diagnosis     Clinical Impression:   1. Sinus congestion    2.  Essential hypertension

## 2018-04-14 NOTE — DISCHARGE INSTRUCTIONS
High Blood Pressure: Care Instructions  Your Care Instructions    If your blood pressure is usually above 140/90, you have high blood pressure, or hypertension. That means the top number is 140 or higher or the bottom number is 90 or higher, or both. Despite what a lot of people think, high blood pressure usually doesn't cause headaches or make you feel dizzy or lightheaded. It usually has no symptoms. But it does increase your risk for heart attack, stroke, and kidney or eye damage. The higher your blood pressure, the more your risk increases. Your doctor will give you a goal for your blood pressure. Your goal will be based on your health and your age. An example of a goal is to keep your blood pressure below 140/90. Lifestyle changes, such as eating healthy and being active, are always important to help lower blood pressure. You might also take medicine to reach your blood pressure goal.  Follow-up care is a key part of your treatment and safety. Be sure to make and go to all appointments, and call your doctor if you are having problems. It's also a good idea to know your test results and keep a list of the medicines you take. How can you care for yourself at home? Medical treatment  · If you stop taking your medicine, your blood pressure will go back up. You may take one or more types of medicine to lower your blood pressure. Be safe with medicines. Take your medicine exactly as prescribed. Call your doctor if you think you are having a problem with your medicine. · Talk to your doctor before you start taking aspirin every day. Aspirin can help certain people lower their risk of a heart attack or stroke. But taking aspirin isn't right for everyone, because it can cause serious bleeding. · See your doctor regularly. You may need to see the doctor more often at first or until your blood pressure comes down.   · If you are taking blood pressure medicine, talk to your doctor before you take decongestants or anti-inflammatory medicine, such as ibuprofen. Some of these medicines can raise blood pressure. · Learn how to check your blood pressure at home. Lifestyle changes  · Stay at a healthy weight. This is especially important if you put on weight around the waist. Losing even 10 pounds can help you lower your blood pressure. · If your doctor recommends it, get more exercise. Walking is a good choice. Bit by bit, increase the amount you walk every day. Try for at least 30 minutes on most days of the week. You also may want to swim, bike, or do other activities. · Avoid or limit alcohol. Talk to your doctor about whether you can drink any alcohol. · Try to limit how much sodium you eat to less than 2,300 milligrams (mg) a day. Your doctor may ask you to try to eat less than 1,500 mg a day. · Eat plenty of fruits (such as bananas and oranges), vegetables, legumes, whole grains, and low-fat dairy products. · Lower the amount of saturated fat in your diet. Saturated fat is found in animal products such as milk, cheese, and meat. Limiting these foods may help you lose weight and also lower your risk for heart disease. · Do not smoke. Smoking increases your risk for heart attack and stroke. If you need help quitting, talk to your doctor about stop-smoking programs and medicines. These can increase your chances of quitting for good. When should you call for help? Call 911 anytime you think you may need emergency care. This may mean having symptoms that suggest that your blood pressure is causing a serious heart or blood vessel problem. Your blood pressure may be over 180/110. ? For example, call 911 if:  ? · You have symptoms of a heart attack. These may include:  ¨ Chest pain or pressure, or a strange feeling in the chest.  ¨ Sweating. ¨ Shortness of breath. ¨ Nausea or vomiting.   ¨ Pain, pressure, or a strange feeling in the back, neck, jaw, or upper belly or in one or both shoulders or arms.  ¨ Lightheadedness or sudden weakness. ¨ A fast or irregular heartbeat. ? · You have symptoms of a stroke. These may include:  ¨ Sudden numbness, tingling, weakness, or loss of movement in your face, arm, or leg, especially on only one side of your body. ¨ Sudden vision changes. ¨ Sudden trouble speaking. ¨ Sudden confusion or trouble understanding simple statements. ¨ Sudden problems with walking or balance. ¨ A sudden, severe headache that is different from past headaches. ? · You have severe back or belly pain. ?Do not wait until your blood pressure comes down on its own. Get help right away. ?Call your doctor now or seek immediate care if:  ? · Your blood pressure is much higher than normal (such as 180/110 or higher), but you don't have symptoms. ? · You think high blood pressure is causing symptoms, such as:  ¨ Severe headache. ¨ Blurry vision. ? Watch closely for changes in your health, and be sure to contact your doctor if:  ? · Your blood pressure measures 140/90 or higher at least 2 times. That means the top number is 140 or higher or the bottom number is 90 or higher, or both. ? · You think you may be having side effects from your blood pressure medicine. ? · Your blood pressure is usually normal, but it goes above normal at least 2 times. Where can you learn more? Go to http://jude-kae.info/. Enter G324 in the search box to learn more about \"High Blood Pressure: Care Instructions. \"  Current as of: September 21, 2016  Content Version: 11.4  © 0713-3155 [employer]. Care instructions adapted under license by [employer] (which disclaims liability or warranty for this information). If you have questions about a medical condition or this instruction, always ask your healthcare professional. [place] [address] any warranty or liability for your use of this information.

## 2018-04-14 NOTE — ED NOTES
Patient is preciado x 4, patent airway walks with a steady gait, was provided with 2 prescriptions and had his hospital band removed prior to discharge. I have reviewed discharge instructions with the patient. The patient verbalized understanding.

## 2018-04-25 ENCOUNTER — OFFICE VISIT (OUTPATIENT)
Dept: FAMILY MEDICINE CLINIC | Age: 43
End: 2018-04-25

## 2018-04-25 VITALS
TEMPERATURE: 98.4 F | WEIGHT: 221.6 LBS | BODY MASS INDEX: 29.37 KG/M2 | RESPIRATION RATE: 20 BRPM | SYSTOLIC BLOOD PRESSURE: 129 MMHG | HEART RATE: 97 BPM | HEIGHT: 73 IN | DIASTOLIC BLOOD PRESSURE: 91 MMHG | OXYGEN SATURATION: 95 %

## 2018-04-25 DIAGNOSIS — J31.0 RHINITIS, UNSPECIFIED TYPE: ICD-10-CM

## 2018-04-25 DIAGNOSIS — K62.5 ANAL BLEEDING: Primary | ICD-10-CM

## 2018-04-25 DIAGNOSIS — K21.9 GASTROESOPHAGEAL REFLUX DISEASE, ESOPHAGITIS PRESENCE NOT SPECIFIED: ICD-10-CM

## 2018-04-25 DIAGNOSIS — F19.10 POLYSUBSTANCE ABUSE (HCC): ICD-10-CM

## 2018-04-25 DIAGNOSIS — R42 DIZZINESS: ICD-10-CM

## 2018-04-25 RX ORDER — DEXLANSOPRAZOLE 30 MG/1
30 CAPSULE, DELAYED RELEASE ORAL
Qty: 90 CAP | Refills: 3 | Status: SHIPPED | COMMUNITY
Start: 2018-04-25 | End: 2018-12-10 | Stop reason: SDUPTHER

## 2018-04-25 NOTE — PATIENT INSTRUCTIONS
Constipation: Care Instructions  Your Care Instructions    Constipation means that you have a hard time passing stools (bowel movements). People pass stools from 3 times a day to once every 3 days. What is normal for you may be different. Constipation may occur with pain in the rectum and cramping. The pain may get worse when you try to pass stools. Sometimes there are small amounts of bright red blood on toilet paper or the surface of stools. This is because of enlarged veins near the rectum (hemorrhoids). A few changes in your diet and lifestyle may help you avoid ongoing constipation. Your doctor may also prescribe medicine to help loosen your stool. Some medicines can cause constipation. These include pain medicines and antidepressants. Tell your doctor about all the medicines you take. Your doctor may want to make a medicine change to ease your symptoms. Follow-up care is a key part of your treatment and safety. Be sure to make and go to all appointments, and call your doctor if you are having problems. It's also a good idea to know your test results and keep a list of the medicines you take. How can you care for yourself at home? · Drink plenty of fluids, enough so that your urine is light yellow or clear like water. If you have kidney, heart, or liver disease and have to limit fluids, talk with your doctor before you increase the amount of fluids you drink. · Include high-fiber foods in your diet each day. These include fruits, vegetables, beans, and whole grains. · Get at least 30 minutes of exercise on most days of the week. Walking is a good choice. You also may want to do other activities, such as running, swimming, cycling, or playing tennis or team sports. · Take a fiber supplement, such as Citrucel or Metamucil, every day. Read and follow all instructions on the label. · Schedule time each day for a bowel movement. A daily routine may help.  Take your time having your bowel movement. · Support your feet with a small step stool when you sit on the toilet. This helps flex your hips and places your pelvis in a squatting position. · Your doctor may recommend an over-the-counter laxative to relieve your constipation. Examples are Milk of Magnesia and MiraLax. Read and follow all instructions on the label. Do not use laxatives on a long-term basis. When should you call for help? Call your doctor now or seek immediate medical care if:  ? · You have new or worse belly pain. ? · You have new or worse nausea or vomiting. ? · You have blood in your stools. ? Watch closely for changes in your health, and be sure to contact your doctor if:  ? · Your constipation is getting worse. ? · You do not get better as expected. Where can you learn more? Go to http://jude-kae.info/. Enter 21 [phone] in the search box to learn more about \"Constipation: Care Instructions. \"  Current as of: March 20, 2017  Content Version: 11.4  © 9740-0171 [employer]. Care instructions adapted under license by [employer] (which disclaims liability or warranty for this information). If you have questions about a medical condition or this instruction, always ask your healthcare professional. [address] any warranty or liability for your use of this information.

## 2018-04-25 NOTE — PROGRESS NOTES
KRISTIE TORRES [facility]  [place], [address]  [phone] office/[phone] fax      4/25/2018    Reason for visit:   Chief Complaint   Patient presents with    Heartburn    Dizziness    Anal Bleeding     Patient says he has bright blood in stools off and on for some months, declines exam        Patient: Clyde Guillaume, 1975, [id number]       Primary MD: Ashley Manzo NP    Subjective:   Clyde Guillaume, a 36 y.o. male, who presents for Heartburn; Dizziness; and Anal Bleeding. HPI  Rectal Bleeding:    Mr Deejay Vance presents today with complaints of intermittent rectal bleeding with ibeth blood with wiping. He denies current bleeding. Today he declined rectal examination. He denies any fevers, wt loss, h/o hemorrhoids, NSAID use, inflammatory bowel disease, diverticulosis, N/V/D, or abdominal pain. He does concurrently report using methadone along with intermittent use of Cocaine and Heroin via snorting. He also has a history of constipation and straining and is taking Linzess for treatment. He denies any home treatment but reports he just wanted to bring it up. He has never had a colonoscopoy and no known family history of Colon Cancer. Heartburn  Pt with h/o GERD continues to complain of gastric discomfort aggravated with eating. He has been prescribed a PPI Dexilant but currently he doesn't have it as he has not signed application for med via patient assistance program. He has not tried any OTC preparations. Dizziness  Reports he woke one day 1 month ago with dizziness and seeing floaters. Associated symptoms includes Nasal congestion. He denies any hypoglycemia, hypotension, head injury, h/o Vertigo or Minerres disease. He denies any current symptoms at the moment. He does report that he has started back snorting cocaine and heroin. he is thinking about self detoxing on his own.     Previously he was seen in ED 4/14/18 and treated with Flonase, pt reports he never picked up medication as it was too expensive. Past Medical History:   Diagnosis Date    ADD (attention deficit disorder)     Depression     Heroin abuse     Started age 14 years, on methadone clinic     Hypertension        Past Surgical History:   Procedure Laterality Date    HX HERNIA REPAIR      surgery as infant       Social History     Social History    Marital status: SINGLE     Spouse name: N/A    Number of children: N/A    Years of education: GED     Occupational History    Not on file. Social History Main Topics    Smoking status: Current Every Day Smoker     Packs/day: 0.50     Years: 25.00    Smokeless tobacco: Never Used    Alcohol use 0.6 oz/week     1 Cans of beer per week      Comment: rarely    Drug use: Yes     Special: Heroin    Sexual activity: Yes     Partners: Female     Birth control/ protection: None     Other Topics Concern    Exercise No    Seat Belt No     Social History Narrative       No Known Allergies    Current Outpatient Prescriptions on File Prior to Visit   Medication Sig Dispense Refill    dexlansoprazole (DEXILANT) 30 mg capsule Take 1 Cap by mouth daily as needed. Indications: gastroesophageal reflux disease 90 Cap 3    tadalafil (CIALIS) 20 mg tablet Take 1 Tab by mouth as needed. Indications: Erectile Dysfunction 30 Tab 3    fluticasone-salmeterol (ADVAIR) 100-50 mcg/dose diskus inhaler Take 1 Puff by inhalation every twelve (12) hours. 3 Inhaler 0    albuterol (PROVENTIL HFA, VENTOLIN HFA, PROAIR HFA) 90 mcg/actuation inhaler Take 2 Puffs by inhalation every four (4) hours as needed for Shortness of Breath. 3 Inhaler 3    METHADONE HCL (METHADONE PO) Take 85 mg by mouth daily.  linaclotide (LINZESS) 145 mcg cap capsule Take 1 Cap by mouth Daily (before breakfast). 90 Cap 3    amLODIPine-atorvastatin (CADUET) 5-10 mg per tablet Take 1 Tab by mouth daily.  90 Tab 3    fluticasone (FLONASE) 50 mcg/actuation nasal spray 2 Sprays by Both Nostrils route daily. 1 Bottle 0    multivitamin (ONE A DAY) tablet Take 1 Tab by mouth daily.  hydrocortisone (CORTAID) 0.5 % topical cream Apply  to affected area two (2) times a day. use thin layer       No current facility-administered medications on file prior to visit. Review of Systems   Constitutional: Negative. HENT: Positive for congestion. Negative for ear discharge, ear pain, hearing loss, nosebleeds and tinnitus. Eyes: Negative. Respiratory: Negative. Cardiovascular: Negative. Gastrointestinal: Positive for blood in stool, constipation and heartburn. Negative for abdominal pain, diarrhea, nausea and vomiting. Genitourinary: Negative. Musculoskeletal: Negative. Skin: Negative. Neurological: Positive for dizziness. Negative for tingling and headaches. Psychiatric/Behavioral: Positive for substance abuse. Negative for hallucinations and suicidal ideas. The patient is not nervous/anxious. Objective:   Visit Vitals    BP (!) 129/91    Pulse 97    Temp 98.4 °F (36.9 °C)    Resp 20    Ht 6' 1\" (1.854 m)    Wt 221 lb 9.6 oz (100.5 kg)    SpO2 95%    BMI 29.24 kg/m2      Wt Readings from Last 3 Encounters:   04/25/18 221 lb 9.6 oz (100.5 kg)   04/14/18 225 lb (102.1 kg)   01/15/18 225 lb 6.4 oz (102.2 kg)     Lab Results   Component Value Date/Time    Glucose 118 (H) 01/10/2018 09:08 AM         Physical Exam   Constitutional: He is oriented to person, place, and time. He appears well-developed and well-nourished. HENT:   Head: Normocephalic. Nose: Mucosal edema present. No rhinorrhea. Right sinus exhibits no maxillary sinus tenderness and no frontal sinus tenderness. Left sinus exhibits no maxillary sinus tenderness and no frontal sinus tenderness. Eyes: Pupils are equal, round, and reactive to light. Neck: Normal range of motion. Neck supple. No JVD present. Carotid bruit is not present. No thyromegaly present.    Cardiovascular: Normal rate and regular rhythm. No murmur heard. Pulmonary/Chest: Effort normal and breath sounds normal. No respiratory distress. He has no wheezes. Abdominal: Soft. Bowel sounds are normal. He exhibits no distension. There is no tenderness. Musculoskeletal: Normal range of motion. He exhibits no edema or deformity. Neurological: He is alert and oriented to person, place, and time. He has normal strength and normal reflexes. He displays no tremor. No cranial nerve deficit or sensory deficit. He exhibits normal muscle tone. He displays no seizure activity. Coordination and gait normal. GCS eye subscore is 4. GCS verbal subscore is 5. GCS motor subscore is 6. Skin: Skin is warm and dry. Psychiatric: He has a normal mood and affect. His speech is normal. Judgment and thought content normal. He is hyperactive. Vitals reviewed. Assessment:    Jesica Russell who has risk factors including (see above previous medical hx) and:       ICD-10-CM ICD-9-CM    1. Anal bleeding K62.5 569.3 OCCULT BLOOD, IMMUNOASSAY (FIT)   2. Gastroesophageal reflux disease, esophagitis presence not specified K21.9 530.81 dexlansoprazole (DEXILANT) 30 mg capsule   3. Polysubstance abuse F19.10 305.90    4. Rhinitis, unspecified type J31.0 472.0    5. Dizziness R42 780.4      1. Anal bleeding  - Differential Diagnoses include:  Hemorrhoids, Anal Fissure, Diverticulitis/Divverticulosis, IBD, colon CA   - Due to patient refusing rectal exam, unable to give definitive diagnosis in order to treat appropriately. Given medical h/o constipation related to chronic opoid use and no fevers, wt loss, or abdominal pain any inflammatory disease or malignancy is low on the list of differential. Hemmorhoids is however high and recommended the patient seek treatment for opiod misuse, increase fiber in diet, drink plenty of fluids, continue Linzess to prevent constipation.  Also recommend OTC preparations for anal discomfort such as tucks or preparation H.  - Patient agreed to do take home FIT test. Discussed that if this is positive he would need a referral to Colorectal surgery and he will more than likely need to undergo a colonoscopy. - OCCULT BLOOD, IMMUNOASSAY (FIT); Future    2. Gastroesophageal reflux disease, esophagitis presence not specified  - Reordered dexilant via medication program. He is to sign application prior to leaving office.   - dexlansoprazole (DEXILANT) 30 mg capsule; Take 1 Cap by mouth daily as needed. Indications: gastroesophageal reflux disease  Dispense: 90 Cap; Refill: 3    3. Polysubstance abuse  -Instructed pt on the importance of avoiding the use of illicit drugs as these drugs may negatively affect their health. Illicit drug use can weaken the immune system,   cause cardiovascular conditions (abnormal heart rate, palpitations, heart attacks and strokes). Injected drugs can also lead to collapsed veins and infections of the blood vessels and heart valves; nausea, vomiting and abdominal pain, liver damage, liver and brain damage (memory deficits) and seizure activity. - Patient strongly discouraged to not do self detoc as this can be extremely dangerous and could lead to death if not under medical supervision. He was given a sheet of local detox clinics in the area to call. - Clonidine was offered to patient, but he declined. 4. Rhinitis, unspecified type  - The patient given good RX coupon to  Flonase at discounted rate. Discussed how his drug use by way of inhalation are contributory factors to his continue rhinitis and that cessation of snorting heroin and cocaine will help with his symptom management. 5. Dizziness  - Undetermined etiology. Could be multifactorial due to chronic drug use, rhinitis, vagal response, or orthostatic hypotension. At this point neurologically and cardio vascularly he is stable and VSS. Continue Flonase and work on polysubstance abuse.        Written instructions followed our verbal discussion of all information discussed above, pending tests ordered and future goals/plans. Patient expressed understanding of current diagnosis, planned testing, follow up and if needed to contact the office for any questions or concerns prior to the next visit. Plan:   Med reconciliation completed with patient. Reviewed side effects of medications with the patient. Questions were answered and patient verb understanding. Discussed lab results with patient  Displays most recent values of common labs: H&H, WBC, Platelets, ALT, AST, BUN, Creat, Na, K, TSH, HgbA1c, Lipids, INR and/or PSA. For additional labs, please use Results Review or Flowsheets. Lab Results   Component Value Date/Time    WBC 7.5 08/15/2017 01:05 PM    HGB 11.6 (L) 08/15/2017 01:05 PM    HCT 35.6 (L) 08/15/2017 01:05 PM    PLATELET 365 47/36/9087 01:05 PM       Lab Results   Component Value Date/Time    ALT (SGPT) 31 01/10/2018 09:08 AM    AST (SGOT) 19 01/10/2018 09:08 AM    Creatinine 0.95 01/10/2018 09:08 AM    BUN 8 01/10/2018 09:08 AM    Sodium 139 01/10/2018 09:08 AM    Potassium 4.2 01/10/2018 09:08 AM       Lab Results   Component Value Date/Time    Cholesterol, total 180 01/10/2018 09:08 AM    HDL Cholesterol 55 01/10/2018 09:08 AM    LDL, calculated 86.8 01/10/2018 09:08 AM    Triglyceride 191 (H) 01/10/2018 09:08 AM    TSH 2.19 08/15/2017 01:05 PM       No results found for: PSAPOC, PSA3, PSAFLT, PSALT, PSA, PSA2, PSAR1    Orders Placed This Encounter    OCCULT BLOOD, IMMUNOASSAY (FIT)     Standing Status:   Future     Standing Expiration Date:   4/26/2019    dexlansoprazole (DEXILANT) 30 mg capsule     Sig: Take 1 Cap by mouth daily as needed.  Indications: gastroesophageal reflux disease     Dispense:  90 Cap     Refill:  3     Order Specific Question:   Expiration Date     Answer:   4/30/2019     Comments:   ERROR - MEDICATION NOT GIVEN NONE IN STOCK     Order Specific Question:   Lot#     Answer:   [id number]     Order Specific Question:      Answer:   [id number]     Current Outpatient Prescriptions   Medication Sig Dispense Refill    dexlansoprazole (DEXILANT) 30 mg capsule Take 1 Cap by mouth daily as needed. Indications: gastroesophageal reflux disease 90 Cap 3    dexlansoprazole (DEXILANT) 30 mg capsule Take 1 Cap by mouth daily as needed. Indications: gastroesophageal reflux disease 90 Cap 3    tadalafil (CIALIS) 20 mg tablet Take 1 Tab by mouth as needed. Indications: Erectile Dysfunction 30 Tab 3    fluticasone-salmeterol (ADVAIR) 100-50 mcg/dose diskus inhaler Take 1 Puff by inhalation every twelve (12) hours. 3 Inhaler 0    albuterol (PROVENTIL HFA, VENTOLIN HFA, PROAIR HFA) 90 mcg/actuation inhaler Take 2 Puffs by inhalation every four (4) hours as needed for Shortness of Breath. 3 Inhaler 3    METHADONE HCL (METHADONE PO) Take 85 mg by mouth daily.  linaclotide (LINZESS) 145 mcg cap capsule Take 1 Cap by mouth Daily (before breakfast). 90 Cap 3    amLODIPine-atorvastatin (CADUET) 5-10 mg per tablet Take 1 Tab by mouth daily. 90 Tab 3    fluticasone (FLONASE) 50 mcg/actuation nasal spray 2 Sprays by Both Nostrils route daily. 1 Bottle 0    multivitamin (ONE A DAY) tablet Take 1 Tab by mouth daily.  hydrocortisone (CORTAID) 0.5 % topical cream Apply  to affected area two (2) times a day. use thin layer       Medications Discontinued During This Encounter   Medication Reason    amLODIPine-atorvastatin (CADUET) 5-10 mg per tablet Duplicate Order       Follow-up Disposition:  Return if symptoms worsen or fail to improve. Labs needed for follow-up appt      Yoko Farmer, [address] I spent 25 minutes with the patient in face-to-face consultation, of which greater than 50% was spent in counseling and coordination of care as described above.

## 2018-04-25 NOTE — MR AVS SNAPSHOT
[address] [facility] [id number] [phone] Patient: Daniel Boyd MRN: [id number] ONU:5/4/4207 Visit Information Date & Time Provider Department Dept. Phone Encounter #  
 4/25/2018  2:30 PM Caitlin Don NP [address] [facility] [id number] Follow-up Instructions Return if symptoms worsen or fail to improve. Your Appointments 6/11/2018 10:30 AM  
Follow Up with Caitlin Don NP [address] [facility] ([address]) Appt Note: 5 mth follow up  
 [address] [facility] [id number]  
[address] [facility] [id number] Upcoming Health Maintenance Date Due DTaP/Tdap/Td series (1 - Tdap) 7/4/1995 Pneumococcal 19-64 Medium Risk (1 of 1 - PPSV23) 1/15/2019* *Topic was postponed. The date shown is not the original due date. Allergies as of 4/25/2018  Review Complete On: 4/25/2018 By: Yuki Joseph No Known Allergies Current Immunizations  Never Reviewed No immunizations on file. Not reviewed this visit You Were Diagnosed With   
  
 Codes Comments Anal bleeding    -  Primary ICD-10-CM: K62.5 ICD-9-CM: 569.3 Gastroesophageal reflux disease, esophagitis presence not specified     ICD-10-CM: K21.9 ICD-9-CM: 530.81 Allergic rhinitis, unspecified seasonality, unspecified trigger     ICD-10-CM: J30.9 ICD-9-CM: 477.9 Vitals BP Pulse Temp Resp Height(growth percentile) Weight(growth percentile) (!) 129/91 97 98.4 °F (36.9 °C) 20 6' 1\" (1.854 m) 221 lb 9.6 oz (100.5 kg) SpO2 BMI Smoking Status 95% 29.24 kg/m2 Current Every Day Smoker Vitals History BMI and BSA Data Body Mass Index Body Surface Area  
 29.24 kg/m 2 2.28 m 2 Preferred Pharmacy Pharmacy Name Phone [address]. [phone] Your Updated Medication List  
  
   
This list is accurate as of 4/25/18  3:21 PM.  Always use your most recent med list.  
  
  
  
  
 albuterol 90 mcg/actuation inhaler Commonly known as:  PROVENTIL HFA, VENTOLIN HFA, PROAIR HFA Take 2 Puffs by inhalation every four (4) hours as needed for Shortness of Breath. amLODIPine-atorvastatin 5-10 mg per tablet Commonly known as:  CADUET Take 1 Tab by mouth daily. * dexlansoprazole 30 mg capsule Commonly known as:  DEXILANT Take 1 Cap by mouth daily as needed. Indications: gastroesophageal reflux disease * dexlansoprazole 30 mg capsule Commonly known as:  DEXILANT Take 1 Cap by mouth daily as needed. Indications: gastroesophageal reflux disease  
  
 fluticasone 50 mcg/actuation nasal spray Commonly known as:  Clary Sale 2 Sprays by Both Nostrils route daily. fluticasone-salmeterol 100-50 mcg/dose diskus inhaler Commonly known as:  ADVAIR Take 1 Puff by inhalation every twelve (12) hours. hydrocortisone 0.5 % topical cream  
Commonly known as:  CORTAID Apply  to affected area two (2) times a day. use thin layer  
  
 linaclotide 145 mcg Cap capsule Commonly known as:  Alex Roof Take 1 Cap by mouth Daily (before breakfast). METHADONE PO Take 85 mg by mouth daily. multivitamin tablet Commonly known as:  ONE A DAY Take 1 Tab by mouth daily. tadalafil 20 mg tablet Commonly known as:  CIALIS Take 1 Tab by mouth as needed. Indications: Erectile Dysfunction * Notice: This list has 2 medication(s) that are the same as other medications prescribed for you. Read the directions carefully, and ask your doctor or other care provider to review them with you. Follow-up Instructions Return if symptoms worsen or fail to improve. To-Do List   
 05/25/2018 Lab:  OCCULT BLOOD, IMMUNOASSAY (FIT) Patient Instructions Constipation: Care Instructions Your Care Instructions Constipation means that you have a hard time passing stools (bowel movements). People pass stools from 3 times a day to once every 3 days. What is normal for you may be different. Constipation may occur with pain in the rectum and cramping. The pain may get worse when you try to pass stools. Sometimes there are small amounts of bright red blood on toilet paper or the surface of stools. This is because of enlarged veins near the rectum (hemorrhoids). A few changes in your diet and lifestyle may help you avoid ongoing constipation. Your doctor may also prescribe medicine to help loosen your stool. Some medicines can cause constipation. These include pain medicines and antidepressants. Tell your doctor about all the medicines you take. Your doctor may want to make a medicine change to ease your symptoms. Follow-up care is a key part of your treatment and safety. Be sure to make and go to all appointments, and call your doctor if you are having problems. It's also a good idea to know your test results and keep a list of the medicines you take. How can you care for yourself at home? · Drink plenty of fluids, enough so that your urine is light yellow or clear like water. If you have kidney, heart, or liver disease and have to limit fluids, talk with your doctor before you increase the amount of fluids you drink. · Include high-fiber foods in your diet each day. These include fruits, vegetables, beans, and whole grains. · Get at least 30 minutes of exercise on most days of the week. Walking is a good choice. You also may want to do other activities, such as running, swimming, cycling, or playing tennis or team sports. · Take a fiber supplement, such as Citrucel or Metamucil, every day. Read and follow all instructions on the label. · Schedule time each day for a bowel movement. A daily routine may help. Take your time having your bowel movement. · Support your feet with a small step stool when you sit on the toilet. This helps flex your hips and places your pelvis in a squatting position. · Your doctor may recommend an over-the-counter laxative to relieve your constipation. Examples are Milk of Magnesia and MiraLax. Read and follow all instructions on the label. Do not use laxatives on a long-term basis. When should you call for help? Call your doctor now or seek immediate medical care if: 
? · You have new or worse belly pain. ? · You have new or worse nausea or vomiting. ? · You have blood in your stools. ? Watch closely for changes in your health, and be sure to contact your doctor if: 
? · Your constipation is getting worse. ? · You do not get better as expected. Where can you learn more? Go to http://jude-kae.info/. Enter 21 [phone] in the search box to learn more about \"Constipation: Care Instructions. \" Current as of: March 20, 2017 Content Version: 11.4 © 7237-0087 [employer]. Care instructions adapted under license by [employer] (which disclaims liability or warranty for this information). If you have questions about a medical condition or this instruction, always ask your healthcare professional. [address] any warranty or liability for your use of this information. Introducing [facility]! Danna Maki introduces [employer] patient portal. Now you can access parts of your medical record, email your doctor's office, and request medication refills online. 1. In your internet browser, go to [email or web address] 2. Click on the First Time User? Click Here link in the Sign In box. You will see the New Member Sign Up page. 3. Enter your [employer] Access Code exactly as it appears below. You will not need to use this code after youve completed the sign-up process.  If you do not sign up before the expiration date, you must request a new code. · [employer] Access Code: [email or web address] Expires: 7/22/2018  5:24 AM 
 
4. Enter the last four digits of your Social Security Number (xxxx) and Date of Birth (mm/dd/yyyy) as indicated and click Submit. You will be taken to the next sign-up page. 5. Create a Noknoker ID. This will be your [employer] login ID and cannot be changed, so think of one that is secure and easy to remember. 6. Create a [employer] password. You can change your password at any time. 7. Enter your Password Reset Question and Answer. This can be used at a later time if you forget your password. 8. Enter your e-mail address. You will receive e-mail notification when new information is available in [address] 9. Click Sign Up. You can now view and download portions of your medical record. 10. Click the Download Summary menu link to download a portable copy of your medical information. If you have questions, please visit the Frequently Asked Questions section of the [employer] website. Remember, [employer] is NOT to be used for urgent needs. For medical emergencies, dial 911. Now available from your iPhone and Android! Please provide this summary of care documentation to your next provider. Your primary care clinician is listed as Niko Prom. If you have any questions after today's visit, please call [phone].

## 2018-04-25 NOTE — LETTER
4/25/2018 3:22 PM 
 
Mr. Eloisa Gaspar [address] [facility]  [address] [facility] [address] To whom it may concern, Eloisa Gaspar was under our medical care at [facility] 04/25/18. Thank you Mindi Pike  
 
 
, 
 
 
Carlin Donohue NP

## 2018-05-15 ENCOUNTER — HOSPITAL ENCOUNTER (OUTPATIENT)
Dept: LAB | Age: 43
Discharge: HOME | End: 2018-05-15

## 2018-05-15 ENCOUNTER — TELEPHONE (OUTPATIENT)
Dept: FAMILY MEDICINE CLINIC | Age: 43
End: 2018-05-15

## 2018-05-15 DIAGNOSIS — K62.5 ANAL BLEEDING: ICD-10-CM

## 2018-05-15 PROCEDURE — 82274 ASSAY TEST FOR BLOOD FECAL: CPT | Performed by: NURSE PRACTITIONER

## 2018-05-15 NOTE — TELEPHONE ENCOUNTER
Medication: Advair 100/50 mcg, dose: 1 inhalation, how often: twice daily, current number of medication days provided: 90, refill per application. Lot #:[id number], EXP 05/2019  . This medication was received and verified for the following 1. Correct Patient, 2. Correct Diagnosis, 3. Correct Drug, 4. Correct route, and no current allergy to medication. Medication: Dexilant 30mg  , dose: 1 tab, how often: every day as needed , current number of medication days provided: 90, refill per application. Lot #: [id number], EXP 05/2019. This medication was received and verified for the following 1. Correct Patient, 2. Correct Diagnosis, 3. Correct Drug, 4. Correct route, and no current allergy to medication. Please contact patient to come  their medications.      Lori Bey, MSN, RN, [facility]

## 2018-05-17 LAB — HEMOCCULT STL QL IA: NEGATIVE

## 2018-06-07 ENCOUNTER — LAB ONLY (OUTPATIENT)
Dept: FAMILY MEDICINE CLINIC | Age: 43
End: 2018-06-07

## 2018-06-07 ENCOUNTER — HOSPITAL ENCOUNTER (OUTPATIENT)
Dept: LAB | Age: 43
Discharge: HOME | End: 2018-06-07

## 2018-06-07 DIAGNOSIS — I10 HTN, GOAL BELOW 140/90: ICD-10-CM

## 2018-06-07 DIAGNOSIS — I10 HTN, GOAL BELOW 140/90: Primary | ICD-10-CM

## 2018-06-07 LAB
ALBUMIN SERPL-MCNC: 3.3 G/DL (ref 3.4–5)
ALBUMIN/GLOB SERPL: 1.2 {RATIO} (ref 0.8–1.7)
ALP SERPL-CCNC: 86 U/L (ref 45–117)
ALT SERPL-CCNC: 25 U/L (ref 16–61)
ANION GAP SERPL CALC-SCNC: 7 MMOL/L (ref 3–18)
AST SERPL-CCNC: 20 U/L (ref 15–37)
BILIRUB SERPL-MCNC: 0.2 MG/DL (ref 0.2–1)
BUN SERPL-MCNC: 10 MG/DL (ref 7–18)
BUN/CREAT SERPL: 10 (ref 12–20)
CALCIUM SERPL-MCNC: 8.9 MG/DL (ref 8.5–10.1)
CHLORIDE SERPL-SCNC: 104 MMOL/L (ref 100–108)
CHOLEST SERPL-MCNC: 169 MG/DL
CO2 SERPL-SCNC: 29 MMOL/L (ref 21–32)
CREAT SERPL-MCNC: 0.97 MG/DL (ref 0.6–1.3)
ERYTHROCYTE [DISTWIDTH] IN BLOOD BY AUTOMATED COUNT: 14.9 % (ref 11.6–14.5)
GLOBULIN SER CALC-MCNC: 2.8 G/DL (ref 2–4)
GLUCOSE SERPL-MCNC: 103 MG/DL (ref 74–99)
HCT VFR BLD AUTO: 42.2 % (ref 36–48)
HDLC SERPL-MCNC: 41 MG/DL (ref 40–60)
HDLC SERPL: 4.1 {RATIO} (ref 0–5)
HGB BLD-MCNC: 13.6 G/DL (ref 13–16)
LDLC SERPL CALC-MCNC: 73.4 MG/DL (ref 0–100)
LIPID PROFILE,FLP: ABNORMAL
MCH RBC QN AUTO: 28.5 PG (ref 24–34)
MCHC RBC AUTO-ENTMCNC: 32.2 G/DL (ref 31–37)
MCV RBC AUTO: 88.5 FL (ref 74–97)
PLATELET # BLD AUTO: 370 K/UL (ref 135–420)
PMV BLD AUTO: 9.9 FL (ref 9.2–11.8)
POTASSIUM SERPL-SCNC: 4 MMOL/L (ref 3.5–5.5)
PROT SERPL-MCNC: 6.1 G/DL (ref 6.4–8.2)
RBC # BLD AUTO: 4.77 M/UL (ref 4.7–5.5)
SODIUM SERPL-SCNC: 140 MMOL/L (ref 136–145)
TRIGL SERPL-MCNC: 273 MG/DL (ref ?–150)
VLDLC SERPL CALC-MCNC: 54.6 MG/DL
WBC # BLD AUTO: 9.2 K/UL (ref 4.6–13.2)

## 2018-06-07 PROCEDURE — 80061 LIPID PANEL: CPT | Performed by: NURSE PRACTITIONER

## 2018-06-07 PROCEDURE — 85027 COMPLETE CBC AUTOMATED: CPT | Performed by: NURSE PRACTITIONER

## 2018-06-07 PROCEDURE — 80053 COMPREHEN METABOLIC PANEL: CPT | Performed by: NURSE PRACTITIONER

## 2018-06-11 ENCOUNTER — OFFICE VISIT (OUTPATIENT)
Dept: FAMILY MEDICINE CLINIC | Age: 43
End: 2018-06-11

## 2018-06-11 ENCOUNTER — TELEPHONE (OUTPATIENT)
Dept: FAMILY MEDICINE CLINIC | Age: 43
End: 2018-06-11

## 2018-06-11 VITALS
HEART RATE: 70 BPM | RESPIRATION RATE: 16 BRPM | SYSTOLIC BLOOD PRESSURE: 138 MMHG | DIASTOLIC BLOOD PRESSURE: 98 MMHG | WEIGHT: 222.8 LBS | HEIGHT: 73 IN | OXYGEN SATURATION: 95 % | TEMPERATURE: 98.2 F | BODY MASS INDEX: 29.53 KG/M2

## 2018-06-11 DIAGNOSIS — R21 SKIN ERUPTION: ICD-10-CM

## 2018-06-11 DIAGNOSIS — L60.0 INGROWN LEFT BIG TOENAIL: ICD-10-CM

## 2018-06-11 DIAGNOSIS — E78.5 HYPERLIPIDEMIA, UNSPECIFIED HYPERLIPIDEMIA TYPE: ICD-10-CM

## 2018-06-11 DIAGNOSIS — I10 HTN, GOAL BELOW 140/90: Primary | ICD-10-CM

## 2018-06-11 DIAGNOSIS — F17.210 CIGARETTE SMOKER: ICD-10-CM

## 2018-06-11 DIAGNOSIS — N52.9 ERECTILE DYSFUNCTION, UNSPECIFIED ERECTILE DYSFUNCTION TYPE: ICD-10-CM

## 2018-06-11 PROBLEM — K62.5 ANAL BLEEDING: Status: RESOLVED | Noted: 2018-04-25 | Resolved: 2018-06-11

## 2018-06-11 RX ORDER — TRIAMCINOLONE ACETONIDE 1 MG/G
CREAM TOPICAL 2 TIMES DAILY
Qty: 80 G | Refills: 0 | Status: SHIPPED | OUTPATIENT
Start: 2018-06-11 | End: 2018-12-10

## 2018-06-11 NOTE — TELEPHONE ENCOUNTER
Medication: Linzess , dose: 145mg, how often: qd , current number of medication days provided: 90, refill per application. Lot #: [id number], EXP 12/2019. This medication was received and verified for the following 1. Correct Patient, 2. Correct Diagnosis, 3. Correct Drug, 4. Correct route, and no current allergy to medication. Please contact patient to come  their medications.      ERMA Dalal, RN, [facility]

## 2018-06-11 NOTE — PROGRESS NOTES
Chief Complaint   Patient presents with    Follow Up Chronic Condition    Hypertension    Erectile Dysfunction    Cholesterol Problem      Subjective:     Jany Uriostegui is a 36 y.o. male who presents for follow up of hypertension, hyperlipidemia and ED. Diet and Lifestyle: generally follows a low fat low cholesterol diet, generally follows a low sodium diet Reports daily beer drinking. Home BP Monitoring: is not measured at home    Cardiovascular ROS: taking medications as instructed, no medication side effects noted, no TIA's, no chest pain on exertion, no dyspnea on exertion, no swelling of ankles. New concerns: reports a ingrown toe nail that is recurring. Declines podiatry referral as he cannot afford treatment. Patient Active Problem List    Diagnosis Date Noted    Erectile dysfunction 01/15/2018    Cigarette smoker 01/15/2018    Refused influenza vaccine 10/10/2017    HTN, goal below 140/90 08/15/2017     Current Outpatient Prescriptions   Medication Sig Dispense Refill    triamcinolone acetonide (KENALOG) 0.1 % topical cream Apply  to affected area two (2) times a day. use thin layer 80 g 0    dexlansoprazole (DEXILANT) 30 mg capsule Take 1 Cap by mouth daily as needed. Indications: gastroesophageal reflux disease 90 Cap 3    tadalafil (CIALIS) 20 mg tablet Take 1 Tab by mouth as needed. Indications: Erectile Dysfunction 30 Tab 3    fluticasone-salmeterol (ADVAIR) 100-50 mcg/dose diskus inhaler Take 1 Puff by inhalation every twelve (12) hours. 3 Inhaler 0    albuterol (PROVENTIL HFA, VENTOLIN HFA, PROAIR HFA) 90 mcg/actuation inhaler Take 2 Puffs by inhalation every four (4) hours as needed for Shortness of Breath. 3 Inhaler 3    METHADONE HCL (METHADONE PO) Take 85 mg by mouth daily.  linaclotide (LINZESS) 145 mcg cap capsule Take 1 Cap by mouth Daily (before breakfast). 90 Cap 3    dexlansoprazole (DEXILANT) 30 mg capsule Take 1 Cap by mouth daily as needed.  Indications: gastroesophageal reflux disease 90 Cap 3    fluticasone (FLONASE) 50 mcg/actuation nasal spray 2 Sprays by Both Nostrils route daily. 1 Bottle 0    amLODIPine-atorvastatin (CADUET) 5-10 mg per tablet Take 1 Tab by mouth daily. 90 Tab 3    multivitamin (ONE A DAY) tablet Take 1 Tab by mouth daily.        No Known Allergies  Past Medical History:   Diagnosis Date    ADD (attention deficit disorder)     Depression     Heroin abuse     Started age 14 years, on methadone clinic     Hypertension      Past Surgical History:   Procedure Laterality Date    HX HERNIA REPAIR      surgery as infant     Family History   Problem Relation Age of Onset    Depression Mother    Tracey Humphreys Migraines Mother     Diabetes Mother     Arthritis-osteo Mother     Alcohol abuse Father     Hypertension Father     High Cholesterol Father     Arthritis-osteo Father     Alzheimer Maternal Grandmother     Eczema Maternal Grandmother      Social History   Substance Use Topics    Smoking status: Current Every Day Smoker     Packs/day: 0.50     Years: 25.00    Smokeless tobacco: Never Used    Alcohol use 0.6 oz/week     1 Cans of beer per week      Comment: rarely        Lab Results   Component Value Date/Time    WBC 9.2 06/07/2018 08:34 AM    HGB 13.6 06/07/2018 08:34 AM    HCT 42.2 06/07/2018 08:34 AM    PLATELET 105 07/03/7670 08:34 AM    MCV 88.5 06/07/2018 08:34 AM     Lab Results   Component Value Date/Time    Glucose 103 (H) 06/07/2018 08:34 AM    LDL, calculated 73.4 06/07/2018 08:34 AM    Creatinine 0.97 06/07/2018 08:34 AM      Lab Results   Component Value Date/Time    Cholesterol, total 169 06/07/2018 08:34 AM    HDL Cholesterol 41 06/07/2018 08:34 AM    LDL, calculated 73.4 06/07/2018 08:34 AM    Triglyceride 273 (H) 06/07/2018 08:34 AM    CHOL/HDL Ratio 4.1 06/07/2018 08:34 AM     Lab Results   Component Value Date/Time    Sodium 140 06/07/2018 08:34 AM    Potassium 4.0 06/07/2018 08:34 AM    Chloride 104 06/07/2018 08:34 AM CO2 29 06/07/2018 08:34 AM    Anion gap 7 06/07/2018 08:34 AM    Glucose 103 (H) 06/07/2018 08:34 AM    BUN 10 06/07/2018 08:34 AM    Creatinine 0.97 06/07/2018 08:34 AM    BUN/Creatinine ratio 10 (L) 06/07/2018 08:34 AM    GFR est AA >60 06/07/2018 08:34 AM    GFR est non-AA >60 06/07/2018 08:34 AM    Calcium 8.9 06/07/2018 08:34 AM    Bilirubin, total 0.2 06/07/2018 08:34 AM    ALT (SGPT) 25 06/07/2018 08:34 AM    AST (SGOT) 20 06/07/2018 08:34 AM    Alk. phosphatase 86 06/07/2018 08:34 AM    Protein, total 6.1 (L) 06/07/2018 08:34 AM    Albumin 3.3 (L) 06/07/2018 08:34 AM    Globulin 2.8 06/07/2018 08:34 AM    A-G Ratio 1.2 06/07/2018 08:34 AM         Review of Systems, additional:  Pertinent items are noted in HPI. Objective:     Visit Vitals    BP (!) 138/98 (BP 1 Location: Left arm, BP Patient Position: Sitting)    Pulse 70    Temp 98.2 °F (36.8 °C) (Oral)    Resp 16    Ht 6' 1\" (1.854 m)    Wt 222 lb 12.8 oz (101.1 kg)    SpO2 95%    BMI 29.39 kg/m2     Appearance: alert, well appearing, and in no distress. General exam: CVS exam BP noted to be well controlled today in office, S1, S2 normal, no gallop, no murmur, chest clear, no JVD, no HSM, no edema, diabetic exam feet: warm, good capillary refill and Left big toe nail thick and curved. No signs of infection or swelling, peripheral vascular exam both carotids normal upstroke without bruits, pedal pulses normal both DP's and PT's, color, temperature, sensation in feet normal, no lesions, neurological exam alert, oriented, normal speech, no focal findings or movement disorder noted. Lab review: labs reviewed, I note that lipids triglycerides high. Assessment/Plan:     hypertension stable, hyperlipidemia needs improvement, needs to follow diet more regularly.   current treatment plan is effective, no change in therapy  very strongly urged to quit smoking to reduce cardiovascular risk  cardiovascular risk and specific lipid/LDL goals reviewed  reviewed medications and side effects in detail  reviewed potential future medication changes and side effects. ICD-10-CM ICD-9-CM    1. HTN, goal below 140/90 I10 401.9    2. Hyperlipidemia, unspecified hyperlipidemia type E78.5 272.4    3. Erectile dysfunction, unspecified erectile dysfunction type N52.9 607.84    4. Cigarette smoker F17.210 305.1    5. Skin eruption R21 782.1 triamcinolone acetonide (KENALOG) 0.1 % topical cream   6. Ingrown left big toenail L60.0 703.0      Diagnoses and all orders for this visit:  1. HTN, goal below 140/90  The current medical regimen is effective;  continue present plan and medications. 2. Hyperlipidemia, unspecified hyperlipidemia type  Reviewed current value and goal related to prevention level, discussed dietary changes such as low saturated fats and low simple carbohydrates and choosing lean proteins such grilled/broiled/baked fish, chicken or turkey. Medications reviewed with the correct way to take them and side effects that should be reported such as muscle aches, pain, rash or shortness of breath. 3. Erectile dysfunction, unspecified erectile dysfunction type  Continue Cialis     4. Cigarette smoker  -The patient was counseled on the dangers of tobacco use, and was advised to quit, referred to a tobacco cessation program and reluctant to quit. Reviewed strategies to maximize success, including removing cigarettes and smoking materials from environment and stress management. 5. Skin eruption  - triamcinolone acetonide (KENALOG) 0.1 % topical cream; Apply  to affected area two (2) times a day. use thin layer  Dispense: 80 g; Refill: 0    6. Ingrown left big toenail  - Reviewed Dx with patient. Discussed foot soaks and foot care with patient. Pt declined referral to podiatry       Follow-up Disposition:  Return in about 6 months (around 12/11/2018), or if symptoms worsen or fail to improve.

## 2018-06-11 NOTE — PATIENT INSTRUCTIONS
Ingrown Toenail: Care Instructions  Your Care Instructions    An ingrown toenail often occurs because a nail is not trimmed correctly or because shoes are too tight. An ingrown nail can cause an infection. If your toe is infected, your doctor may prescribe antibiotics. Most ingrown toenails can be treated at home. You should trim toenails straight across, so the ends of the nail grow over the skin and not into it. Good nail care can prevent ingrown toenails. Follow-up care is a key part of your treatment and safety. Be sure to make and go to all appointments, and call your doctor if you are having problems. It's also a good idea to know your test results and keep a list of the medicines you take. How can you care for yourself at home? · Trim the nails straight across. Leave the corners a little longer so they do not cut into the skin. To do this when you have an ingrown nail:  ¨ Soak your foot in warm water for about 15 minutes to soften the nail. ¨ Wedge a small piece of wet cotton under the corner of the nail to cushion the nail and lift it slightly. This keeps it from cutting the skin. ¨ Repeat daily until the nail has grown out and can be trimmed. · Do not use manicure scissors to dig under the ingrown nail. You might stab your toe, which could get infected. · Do not trim your toenails too short. · Check with your doctor before trimming your own toenails if you have been diagnosed with diabetes or peripheral arterial disease. These conditions increase the risk of an infection, because you may have decreased sensation in your toes and cut yourself without knowing it. · Wear roomy, comfortable shoes. · If your doctor prescribed antibiotics, take them as directed. Do not stop taking them just because you feel better. You need to take the full course of antibiotics. When should you call for help?   Call your doctor now or seek immediate medical care if:  ? · You have signs of infection, such as:  ¨ Increased pain, swelling, warmth, or redness. ¨ Red streaks leading from the toe. ¨ Pus draining from the toe. ¨ A fever. ? Watch closely for changes in your health, and be sure to contact your doctor if:  ? · You do not get better as expected. Where can you learn more? Go to http://jude-kae.info/. Enter R135 in the search box to learn more about \"Ingrown Toenail: Care Instructions. \"  Current as of: October 13, 2016  Content Version: 11.4  © 1364-6584 [employer]. Care instructions adapted under license by [employer] (which disclaims liability or warranty for this information). If you have questions about a medical condition or this instruction, always ask your healthcare professional. [address] any warranty or liability for your use of this information.

## 2018-06-11 NOTE — LETTER
6/11/2018 [facility] [address] [place], [address] [facility], 1975, is picking up the following medications ordered from the [facility]: STOCK:  CADUET 5/10 MG #30 Virginie Silva Patient's Signature: _____________________________ Today's Date: 6/11/2018

## 2018-06-11 NOTE — MR AVS SNAPSHOT
[address] [facility] [address] 
[phone] Patient: Allison Mohamud MRN: [id number] ETT:0/2/9281 Visit Information Date & Time Provider Department Dept. Phone Encounter #  
 6/11/2018 10:30 AM Shayan Hernandez NP [address] [facility] [id number] Follow-up Instructions Return in about 6 months (around 12/11/2018), or if symptoms worsen or fail to improve. Your Appointments 6/11/2018 10:30 AM  
Follow Up with Shayan Hernandez NP [address] [facility] ([address]) Appt Note: 5 mth follow up  
 [address] [place] [phone]  
[address] [place] [phone]  
  
    
 12/10/2018 10:00 AM  
Follow Up with Shayan Hernandez NP [address] [facility] ([address]) Appt Note: 6 mth follow up  
 [address] [place], [place] [address] [id number] [phone] Upcoming Health Maintenance Date Due DTaP/Tdap/Td series (1 - Tdap) 7/4/1995 Pneumococcal 19-64 Medium Risk (1 of 1 - PPSV23) 1/15/2019* Influenza Age 5 to Adult 8/1/2018 *Topic was postponed. The date shown is not the original due date. Allergies as of 6/11/2018  Review Complete On: 6/11/2018 By: Daphnie Arnold. Grady Oropeza LPN No Known Allergies Current Immunizations  Never Reviewed No immunizations on file. Not reviewed this visit You Were Diagnosed With   
  
 Codes Comments HTN, goal below 140/90    -  Primary ICD-10-CM: I10 
ICD-9-CM: 401.9 Hyperlipidemia, unspecified hyperlipidemia type     ICD-10-CM: E78.5 ICD-9-CM: 272.4 Erectile dysfunction, unspecified erectile dysfunction type     ICD-10-CM: N52.9 ICD-9-CM: 607.84 Cigarette smoker     ICD-10-CM: F17.210 ICD-9-CM: 305.1 Skin eruption     ICD-10-CM: R21 
ICD-9-CM: 782.1 Ingrown left big toenail     ICD-10-CM: L60.0 ICD-9-CM: 703.0 Vitals BP Pulse Temp Resp Height(growth percentile) Weight(growth percentile) (!) 138/98 (BP 1 Location: Left arm, BP Patient Position: Sitting) 70 98.2 °F (36.8 °C) (Oral) 16 6' 1\" (1.854 m) 222 lb 12.8 oz (101.1 kg) SpO2 BMI Smoking Status 95% 29.39 kg/m2 Current Every Day Smoker Vitals History BMI and BSA Data Body Mass Index Body Surface Area  
 29.39 kg/m 2 2.28 m 2 Preferred Pharmacy Pharmacy Name Phone [address] [phone] Your Updated Medication List  
  
   
This list is accurate as of 6/11/18 10:03 AM.  Always use your most recent med list.  
  
  
  
  
 albuterol 90 mcg/actuation inhaler Commonly known as:  PROVENTIL HFA, VENTOLIN HFA, PROAIR HFA Take 2 Puffs by inhalation every four (4) hours as needed for Shortness of Breath. amLODIPine-atorvastatin 5-10 mg per tablet Commonly known as:  CADUET Take 1 Tab by mouth daily. * dexlansoprazole 30 mg capsule Commonly known as:  DEXILANT Take 1 Cap by mouth daily as needed. Indications: gastroesophageal reflux disease * dexlansoprazole 30 mg capsule Commonly known as:  DEXILANT Take 1 Cap by mouth daily as needed. Indications: gastroesophageal reflux disease  
  
 fluticasone 50 mcg/actuation nasal spray Commonly known as:  Deetta Britain 2 Sprays by Both Nostrils route daily. fluticasone-salmeterol 100-50 mcg/dose diskus inhaler Commonly known as:  ADVAIR Take 1 Puff by inhalation every twelve (12) hours. linaclotide 145 mcg Cap capsule Commonly known as:  Lorelee Spikes Take 1 Cap by mouth Daily (before breakfast). METHADONE PO Take 85 mg by mouth daily. multivitamin tablet Commonly known as:  ONE A DAY Take 1 Tab by mouth daily. tadalafil 20 mg tablet Commonly known as:  CIALIS  
 Take 1 Tab by mouth as needed. Indications: Erectile Dysfunction  
  
 triamcinolone acetonide 0.1 % topical cream  
Commonly known as:  KENALOG Apply  to affected area two (2) times a day. use thin layer * Notice: This list has 2 medication(s) that are the same as other medications prescribed for you. Read the directions carefully, and ask your doctor or other care provider to review them with you. Prescriptions Sent to Pharmacy Refills  
 triamcinolone acetonide (KENALOG) 0.1 % topical cream 0 Sig: Apply  to affected area two (2) times a day. use thin layer Class: Normal  
 Pharmacy: [facility] [address] SUSIE BHATIA [address]  #: [phone] Route: Topical  
  
Follow-up Instructions Return in about 6 months (around 12/11/2018), or if symptoms worsen or fail to improve. To-Do List   
 Around 12/08/2018 Lab:  LIPID PANEL Around 12/08/2018 Lab:  METABOLIC PANEL, COMPREHENSIVE Patient Instructions Ingrown Toenail: Care Instructions Your Care Instructions An ingrown toenail often occurs because a nail is not trimmed correctly or because shoes are too tight. An ingrown nail can cause an infection. If your toe is infected, your doctor may prescribe antibiotics. Most ingrown toenails can be treated at home. You should trim toenails straight across, so the ends of the nail grow over the skin and not into it. Good nail care can prevent ingrown toenails. Follow-up care is a key part of your treatment and safety. Be sure to make and go to all appointments, and call your doctor if you are having problems. It's also a good idea to know your test results and keep a list of the medicines you take. How can you care for yourself at home? · Trim the nails straight across. Leave the corners a little longer so they do not cut into the skin. To do this when you have an ingrown nail: ¨ Soak your foot in warm water for about 15 minutes to soften the nail. ¨ Wedge a small piece of wet cotton under the corner of the nail to cushion the nail and lift it slightly. This keeps it from cutting the skin. ¨ Repeat daily until the nail has grown out and can be trimmed. · Do not use manicure scissors to dig under the ingrown nail. You might stab your toe, which could get infected. · Do not trim your toenails too short. · Check with your doctor before trimming your own toenails if you have been diagnosed with diabetes or peripheral arterial disease. These conditions increase the risk of an infection, because you may have decreased sensation in your toes and cut yourself without knowing it. · Wear roomy, comfortable shoes. · If your doctor prescribed antibiotics, take them as directed. Do not stop taking them just because you feel better. You need to take the full course of antibiotics. When should you call for help? Call your doctor now or seek immediate medical care if: 
? · You have signs of infection, such as: 
¨ Increased pain, swelling, warmth, or redness. ¨ Red streaks leading from the toe. ¨ Pus draining from the toe. ¨ A fever. ? Watch closely for changes in your health, and be sure to contact your doctor if: 
? · You do not get better as expected. Where can you learn more? Go to http://jude-kae.info/. Enter R135 in the search box to learn more about \"Ingrown Toenail: Care Instructions. \" Current as of: October 13, 2016 Content Version: 11.4 © 8160-3683 [employer]. Care instructions adapted under license by [employer] (which disclaims liability or warranty for this information). If you have questions about a medical condition or this instruction, always ask your healthcare professional. [place] [address] any warranty or liability for your use of this information. Introducing hospitals & HEALTH SERVICES! Jaylene Sheppard introduces [employer] patient portal. Now you can access parts of your medical record, email your doctor's office, and request medication refills online. 1. In your internet browser, go to [email or web address] 2. Click on the First Time User? Click Here link in the Sign In box. You will see the New Member Sign Up page. 3. Enter your [employer] Access Code exactly as it appears below. You will not need to use this code after youve completed the sign-up process. If you do not sign up before the expiration date, you must request a new code. · [employer] Access Code: [email or web address] Expires: 7/22/2018  5:24 AM 
 
4. Enter the last four digits of your Social Security Number (xxxx) and Date of Birth (mm/dd/yyyy) as indicated and click Submit. You will be taken to the next sign-up page. 5. Create a [employer] ID. This will be your [employer] login ID and cannot be changed, so think of one that is secure and easy to remember. 6. Create a [employer] password. You can change your password at any time. 7. Enter your Password Reset Question and Answer. This can be used at a later time if you forget your password. 8. Enter your e-mail address. You will receive e-mail notification when new information is available in [address] 9. Click Sign Up. You can now view and download portions of your medical record. 10. Click the Download Summary menu link to download a portable copy of your medical information. If you have questions, please visit the Frequently Asked Questions section of the [employer] website. Remember, [employer] is NOT to be used for urgent needs. For medical emergencies, dial 911. Now available from your iPhone and Android! Please provide this summary of care documentation to your next provider. Your primary care clinician is listed as Alice Waters. If you have any questions after today's visit, please call [phone].

## 2018-06-13 ENCOUNTER — TELEPHONE (OUTPATIENT)
Dept: FAMILY MEDICINE CLINIC | Age: 43
End: 2018-06-13

## 2018-06-13 NOTE — TELEPHONE ENCOUNTER
Medication: Caduet 5-10 mg, dose: 1 tab, how often: daily, current number of medication days provided: 90, refill per application. Lot #:  # [id number], EXP 01/2021. This medication was received and verified for the following 1. Correct Patient, 2. Correct Diagnosis, 3. Correct Drug, 4. Correct route, and no current allergy to medication. Please contact patient to come  their medications.      Ileana Le, MSN, RN, FNP-C     [facility]

## 2018-07-10 ENCOUNTER — TELEPHONE (OUTPATIENT)
Dept: FAMILY MEDICINE CLINIC | Age: 43
End: 2018-07-10

## 2018-07-10 NOTE — TELEPHONE ENCOUNTER
Medication: Ventolin HFA, dose: 2 puffs, how often: every 4 hours as needed, current number of medication days provided: 90, refill per application. Lot #: [id number], EXP 07/2019. This medication was received and verified for the following 1. Correct Patient, 2. Correct Diagnosis, 3. Correct Drug, 4. Correct route, and no current allergy to medication. Please contact patient to come  their medications.      Hebert Kawasaki, MSN, RN, [facility]

## 2018-08-07 ENCOUNTER — TELEPHONE (OUTPATIENT)
Dept: FAMILY MEDICINE CLINIC | Age: 43
End: 2018-08-07

## 2018-08-07 NOTE — TELEPHONE ENCOUNTER
Medication: Advair 100/50mcg, dose: 1 inhalation, how often: twice daily, current number of medication days provided: 90, refill per application. Lot #: [id number], EXP 08/2019. This medication was received and verified for the following 1. Correct Patient, 2. Correct Diagnosis, 3. Correct Drug, 4. Correct route, and no current allergy to medication. Please contact patient to come  their medications.      Aislinn Delatorre, MSN, RN, FNP-C     [facility]

## 2018-08-20 ENCOUNTER — TELEPHONE (OUTPATIENT)
Dept: FAMILY MEDICINE CLINIC | Age: 43
End: 2018-08-20

## 2018-08-20 NOTE — TELEPHONE ENCOUNTER
Medication: cialis , dose: 1tab, how often: qd , current number of medication days provided: 90, refill per application. Lot #: [id number], EXP 06/2020. This medication was received and verified for the following 1. Correct Patient, 2. Correct Diagnosis, 3. Correct Drug, 4. Correct route, and no current allergy to medication. Please contact patient to come  their medications.      Shu Blank MSN, RN, FNP-C     [facility]

## 2018-08-22 ENCOUNTER — TELEPHONE (OUTPATIENT)
Dept: FAMILY MEDICINE CLINIC | Age: 43
End: 2018-08-22

## 2018-08-23 NOTE — TELEPHONE ENCOUNTER
Medication: Caduet 5-10mg, dose: 1 tab, how often: daily, current number of medication days provided: 90, refill per application. Lot #:  # [id number], EXP 01/2021. This medication was received and verified for the following 1. Correct Patient, 2. Correct Diagnosis, 3. Correct Drug, 4. Correct route, and no current allergy to medication. Please contact patient to come  their medications.      Carrie Yates MSN, RN, P-C     [facility]

## 2018-08-29 ENCOUNTER — TELEPHONE (OUTPATIENT)
Dept: FAMILY MEDICINE CLINIC | Age: 43
End: 2018-08-29

## 2018-08-30 NOTE — TELEPHONE ENCOUNTER
Medication: Dexilant 30 mg  , dose: 1 tab, how often: every day as needed , current number of medication days provided: 90, refill per application. Lot #: [id number], EXP 08/2019. This medication was received and verified for the following 1. Correct Patient, 2. Correct Diagnosis, 3. Correct Drug, 4. Correct route, and no current allergy to medication. Please contact patient to come  their medications.      Henrik Houston, MSN, RN, FNP-C     [employer]  \

## 2018-09-06 ENCOUNTER — TELEPHONE (OUTPATIENT)
Dept: FAMILY MEDICINE CLINIC | Age: 43
End: 2018-09-06

## 2018-09-06 NOTE — TELEPHONE ENCOUNTER
Medication: Linzess , dose: 145mg, how often: qd , current number of medication days provided: 90, refill per application. Lot #: [id number], EXP 03/2020  . This medication was received and verified for the following 1. Correct Patient, 2. Correct Diagnosis, 3. Correct Drug, 4. Correct route, and no current allergy to medication. Please contact patient to come  their medications.      Lucian Boone MSN, RN, FNP-C     [facility]

## 2018-10-03 ENCOUNTER — TELEPHONE (OUTPATIENT)
Dept: FAMILY MEDICINE CLINIC | Age: 43
End: 2018-10-03

## 2018-10-04 NOTE — TELEPHONE ENCOUNTER
Medication: Ventolin HFA, dose: 2 puffs, how often: every 4 hours as needed, current number of medication days provided: 90, refill per application. Lot #: [id number], EXP 10/2019  . This medication was received and verified for the following 1. Correct Patient, 2. Correct Diagnosis, 3. Correct Drug, 4. Correct route, and no current allergy to medication. Please contact patient to come  their medications.      Radha Santamaria, MSN, RN, [facility]

## 2018-10-11 ENCOUNTER — TELEPHONE (OUTPATIENT)
Dept: FAMILY MEDICINE CLINIC | Age: 43
End: 2018-10-11

## 2018-10-11 NOTE — TELEPHONE ENCOUNTER
Medication: Advair 100/50 mcg, dose: 1 inhalation, how often: twice daily, current number of medication days provided: 90, refill per application. Lot #: [id number], EXP 10/2019. This medication was received and verified for the following 1. Correct Patient, 2. Correct Diagnosis, 3. Correct Drug, 4. Correct route, and no current allergy to medication. Please contact patient to come  their medications.      Dilcia Griffin MSN, RN, [facility]

## 2018-11-20 ENCOUNTER — TELEPHONE (OUTPATIENT)
Dept: FAMILY MEDICINE CLINIC | Age: 43
End: 2018-11-20

## 2018-11-21 NOTE — TELEPHONE ENCOUNTER
Medication: Caduet 5-10 mg, dose: 1 tab, how often: daily, current number of medication days provided: 90, refill per application. Lot #:  # [id number], EXP 01/2021. This medication was received and verified for the following 1. Correct Patient, 2. Correct Diagnosis, 3. Correct Drug, 4. Correct route, and no current allergy to medication. Please contact patient to come  their medications.      Mariaelena Cruz, MSN, RN, FNP-C     [facility]

## 2018-12-10 ENCOUNTER — OFFICE VISIT (OUTPATIENT)
Dept: FAMILY MEDICINE CLINIC | Age: 43
End: 2018-12-10

## 2018-12-10 ENCOUNTER — HOSPITAL ENCOUNTER (OUTPATIENT)
Dept: LAB | Age: 43
Discharge: HOME | End: 2018-12-10

## 2018-12-10 VITALS
SYSTOLIC BLOOD PRESSURE: 128 MMHG | HEART RATE: 79 BPM | RESPIRATION RATE: 22 BRPM | HEIGHT: 73 IN | BODY MASS INDEX: 28.63 KG/M2 | TEMPERATURE: 97.5 F | DIASTOLIC BLOOD PRESSURE: 87 MMHG | WEIGHT: 216 LBS | OXYGEN SATURATION: 95 %

## 2018-12-10 DIAGNOSIS — Z28.21 INFLUENZA VACCINATION DECLINED BY PATIENT: ICD-10-CM

## 2018-12-10 DIAGNOSIS — Z72.0 TOBACCO ABUSE DISORDER: ICD-10-CM

## 2018-12-10 DIAGNOSIS — I10 ESSENTIAL HYPERTENSION: ICD-10-CM

## 2018-12-10 DIAGNOSIS — M54.30 BACK PAIN WITH SCIATICA: ICD-10-CM

## 2018-12-10 DIAGNOSIS — L30.9 DERMATITIS: ICD-10-CM

## 2018-12-10 DIAGNOSIS — E78.5 HYPERLIPIDEMIA, UNSPECIFIED HYPERLIPIDEMIA TYPE: ICD-10-CM

## 2018-12-10 DIAGNOSIS — M54.9 BACK PAIN WITH SCIATICA: ICD-10-CM

## 2018-12-10 DIAGNOSIS — Z12.5 PROSTATE CANCER SCREENING: Primary | ICD-10-CM

## 2018-12-10 DIAGNOSIS — I10 HTN, GOAL BELOW 140/90: ICD-10-CM

## 2018-12-10 LAB
ALBUMIN SERPL-MCNC: 4 G/DL (ref 3.4–5)
ALBUMIN/GLOB SERPL: 1.1 {RATIO} (ref 0.8–1.7)
ALP SERPL-CCNC: 155 U/L (ref 45–117)
ALT SERPL-CCNC: 23 U/L (ref 16–61)
ANION GAP SERPL CALC-SCNC: 5 MMOL/L (ref 3–18)
AST SERPL-CCNC: 13 U/L (ref 15–37)
BILIRUB SERPL-MCNC: <0.1 MG/DL (ref 0.2–1)
BUN SERPL-MCNC: 11 MG/DL (ref 7–18)
BUN/CREAT SERPL: 11 (ref 12–20)
CALCIUM SERPL-MCNC: 9 MG/DL (ref 8.5–10.1)
CHLORIDE SERPL-SCNC: 101 MMOL/L (ref 100–108)
CHOLEST SERPL-MCNC: 186 MG/DL
CO2 SERPL-SCNC: 30 MMOL/L (ref 21–32)
CREAT SERPL-MCNC: 1.01 MG/DL (ref 0.6–1.3)
GLOBULIN SER CALC-MCNC: 3.7 G/DL (ref 2–4)
GLUCOSE SERPL-MCNC: 98 MG/DL (ref 74–99)
HDLC SERPL-MCNC: 51 MG/DL (ref 40–60)
HDLC SERPL: 3.6 {RATIO} (ref 0–5)
LDLC SERPL CALC-MCNC: 108 MG/DL (ref 0–100)
LIPID PROFILE,FLP: ABNORMAL
POTASSIUM SERPL-SCNC: 4.1 MMOL/L (ref 3.5–5.5)
PROT SERPL-MCNC: 7.7 G/DL (ref 6.4–8.2)
SODIUM SERPL-SCNC: 136 MMOL/L (ref 136–145)
TRIGL SERPL-MCNC: 135 MG/DL (ref ?–150)
VLDLC SERPL CALC-MCNC: 27 MG/DL

## 2018-12-10 PROCEDURE — 80053 COMPREHEN METABOLIC PANEL: CPT

## 2018-12-10 PROCEDURE — 80061 LIPID PANEL: CPT

## 2018-12-10 RX ORDER — METHYLPREDNISOLONE 4 MG/1
TABLET ORAL
Qty: 1 DOSE PACK | Refills: 0 | Status: SHIPPED | OUTPATIENT
Start: 2018-12-10 | End: 2019-06-10 | Stop reason: ALTCHOICE

## 2018-12-10 NOTE — PROGRESS NOTES
12/10/18    PCP: Linda Topete NP    Chief Complaint   Patient presents with    Follow Up Chronic Condition    Hypertension        HISTORY OF PRESENT ILLNESS  Lc Pacheco  is a 39 y.o. male whom presents for Follow Up Chronic Condition and Hypertension    Hypertension    The history is provided by the patient. This is a chronic problem. Pertinent negatives include no chest pain, no orthopnea, no palpitations, no PND, no anxiety, no confusion, no malaise/fatigue, no blurred vision, no headaches, no tinnitus, no neck pain, no peripheral edema, no dizziness and no shortness of breath. Risk factors include smoking/tobacco exposure, male gender and hypertension. Rash    The history is provided by the patient. This is a new problem. The current episode started more than 1 week ago. The problem has been gradually worsening. The problem is associated with an unknown factor. There has been no fever. The rash is present on the abdomen, left arm, right arm and right lower leg. The pain is at a severity of 0/10. The pain is moderate. The pain has been worsening since onset. Associated symptoms include itching. Pertinent negatives include no blisters, no pain, no weeping and no hives. He has tried nothing for the symptoms. The treatment provided no relief. Back Pain    The history is provided by the patient. This is a new problem. The current episode started more than 1 week ago. The problem has not changed since onset. 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 ) injury. The pain is associated with lifting. The pain is present in the lower back. The quality of the pain is described as shooting. The pain radiates to the left thigh and right thigh. The pain is at a severity of 0/10. The pain is moderate. Associated symptoms include leg pain.  Pertinent negatives include no chest pain, no headaches, no abdominal swelling, no bowel incontinence, no perianal numbness, no bladder incontinence, no paresthesias, no tingling and no weakness. He has tried NSAIDs (bengay) for the symptoms. The treatment provided moderate relief. Patient Active Problem List    Diagnosis Date Noted    Erectile dysfunction 01/15/2018    Cigarette smoker 01/15/2018    Refused influenza vaccine 10/10/2017    HTN, goal below 140/90 08/15/2017     Current Outpatient Medications   Medication Sig Dispense Refill    methylPREDNISolone (MEDROL DOSEPACK) 4 mg tablet Use as directed 1 Dose Pack 0    fluticasone (FLONASE) 50 mcg/actuation nasal spray 2 Sprays by Both Nostrils route daily. 1 Bottle 0    dexlansoprazole (DEXILANT) 30 mg capsule Take 1 Cap by mouth daily as needed. Indications: gastroesophageal reflux disease 90 Cap 3    tadalafil (CIALIS) 20 mg tablet Take 1 Tab by mouth as needed. Indications: Erectile Dysfunction 30 Tab 3    fluticasone-salmeterol (ADVAIR) 100-50 mcg/dose diskus inhaler Take 1 Puff by inhalation every twelve (12) hours. 3 Inhaler 0    albuterol (PROVENTIL HFA, VENTOLIN HFA, PROAIR HFA) 90 mcg/actuation inhaler Take 2 Puffs by inhalation every four (4) hours as needed for Shortness of Breath. 3 Inhaler 3    METHADONE HCL (METHADONE PO) Take 85 mg by mouth daily.  linaclotide (LINZESS) 145 mcg cap capsule Take 1 Cap by mouth Daily (before breakfast). 90 Cap 3    amLODIPine-atorvastatin (CADUET) 5-10 mg per tablet Take 1 Tab by mouth daily.  80 Tab 3     No Known Allergies  Past Medical History:   Diagnosis Date    ADD (attention deficit disorder)     Depression     Heroin abuse ([address]     Started age 14 years, on methadone clinic     Hypertension      Past Surgical History:   Procedure Laterality Date    HX HERNIA REPAIR      surgery as infant     Family History   Problem Relation Age of Onset    Depression Mother    [email or web address][employer] Migraines Mother     Diabetes Mother     Arthritis-osteo Mother     Alcohol abuse Father     Hypertension Father     High Cholesterol Father     Arthritis-osteo Father     Alzheimer Maternal Grandmother     Eczema Maternal Grandmother      Social History     Tobacco Use    Smoking status: Current Every Day Smoker     Packs/day: 0.50     Years: 25.00     Pack years: 12.50    Smokeless tobacco: Never Used   Substance Use Topics    Alcohol use: Yes     Alcohol/week: 0.6 oz     Types: 1 Cans of beer per week     Comment: rarely       Review of Systems   Constitutional: Negative. Negative for malaise/fatigue. HENT: Negative for tinnitus. Eyes: Negative. Negative for blurred vision. Respiratory: Negative. Negative for shortness of breath. Cardiovascular: Negative. Negative for chest pain, palpitations, orthopnea and PND. Gastrointestinal: Negative. Negative for bowel incontinence. Genitourinary: Negative. Negative for bladder incontinence. Musculoskeletal: Positive for back pain. Negative for falls and neck pain. Skin: Positive for itching and rash. Neurological: Negative for dizziness, tingling, weakness, headaches and paresthesias. Psychiatric/Behavioral: Negative for confusion. Visit Vitals  /87 (BP 1 Location: Left arm, BP Patient Position: Sitting)   Pulse 79   Temp 97.5 °F (36.4 °C) (Oral)   Resp 22   Ht 6' 1\" (1.854 m)   Wt 216 lb (98 kg)   SpO2 95%   BMI 28.50 kg/m²       Pain Scale: 0 - No pain/10    Pain Location:      Physical Exam   Constitutional: He is oriented to person, place, and time and well-developed, well-nourished, and in no distress. HENT:   Head: Normocephalic. Eyes: Pupils are equal, round, and reactive to light. Neck: Normal range of motion. Neck supple. Cardiovascular: Normal rate, regular rhythm and normal heart sounds. Pulmonary/Chest: Effort normal and breath sounds normal.   Abdominal: Soft. Bowel sounds are normal.   Musculoskeletal: Normal range of motion. He exhibits no edema.    Neurological: He is alert and oriented to person, place, and time. Skin: Skin is warm and dry. Rash noted. Rash is papular (Abdomen and LE ) and urticarial (Left upper arm ). Psychiatric: Mood and affect normal.   Vitals reviewed. Labs not available at time of appt. Pt to get labs drawn today    ASSESSMENT and PLAN      ICD-10-CM ICD-9-CM    1. Prostate cancer screening Z12.5 V76.44 PSA, DIAGNOSTIC (PROSTATE SPECIFIC AG)   2. Essential hypertension I10 401.9 COLLECTION VENOUS BLOOD,VENIPUNCTURE      METABOLIC PANEL, COMPREHENSIVE      LIPID PANEL   3. Dermatitis L30.9 692.9 methylPREDNISolone (MEDROL DOSEPACK) 4 mg tablet   4. Back pain with sciatica M54.30 724.3 methylPREDNISolone (MEDROL DOSEPACK) 4 mg tablet    M54.9     5. Tobacco abuse disorder Z72.0 305.1    6. Influenza vaccination declined by patient Z28.21 V64.06      Diagnoses and all orders for this visit:    1. Prostate cancer screening  -     PSA, DIAGNOSTIC (PROSTATE SPECIFIC AG); Future    2. Essential hypertension  -     COLLECTION VENOUS BLOOD,VENIPUNCTURE  -     METABOLIC PANEL, COMPREHENSIVE; Future  -     LIPID PANEL; Future    3. Dermatitis  -     methylPREDNISolone (MEDROL DOSEPACK) 4 mg tablet; Use as directed  - Unknown  Irritant. RTO if not better in 2 weeks. 4. Back pain with sciatica  -     methylPREDNISolone (MEDROL DOSEPACK) 4 mg tablet; Use as directed  -Will give Medrol dose pack   For acute pain, rest, intermittent application of heat (do not sleep on heating pad). Discussed longer term treatment plan of prn NSAID's and discussed a home back care exercise program with flexion exercise routine. Proper lifting with avoidance of heavy lifting discussed. Consider Physical Therapy and XRay studies if not improving. Call or return to clinic prn if these symptoms worsen or fail to improve as anticipated. 5. Tobacco abuse disorder  -Counseled patient on the dangers of tobacco use, and was advised to quit.   Reviewed strategies to maximize success, including the use of Chantix. Discussed the risks of continued tobacco use such as elevated blood pressure, vascular irritation with increased incidence of CVD with stroke or MI and PVD causing claudication, lung damage that could lead to COPD, cancer and death. Encouraged an approach to find a few healthy habits, write them down then plan to decrease their cigarette use by one each week till they are gone all together and to plan for stress that may cause them to want to restart and how to prevent it by having new coping mechanisms in place. 1-800-QUIT-NOW provided for counseling     6. Influenza vaccination declined by patient      reviewed diet, exercise and weight control  very strongly urged to quit smoking to reduce cardiovascular risk  cardiovascular risk and specific lipid/LDL goals reviewed        Medications Discontinued During This Encounter   Medication Reason    dexlansoprazole (DEXILANT) 30 mg capsule Duplicate Order    triamcinolone acetonide (KENALOG) 0.1 % topical cream Not A Current Medication    multivitamin (ONE A DAY) tablet Not A Current Medication       Written instructions followed our verbal discussion of all information discussed above, pending tests ordered and future goals/plans. Patient expressed understanding of current diagnosis, planned testing, follow up and if needed to contact the office for any questions or concerns prior to the next visit. Follow-up Disposition:  Return in about 2 weeks (around 12/24/2018), or if symptoms worsen or fail to improve, for if back pain or rash not better. [facility]

## 2018-12-10 NOTE — LETTER
12/10/2018 [facility] [address] [place], [address] [place], 1975, is picking up the following medications ordered from the [facility]: 
 
CADUET 5/10 MG #90 Avelino Cortez Patient's Signature: _____________________________ Today's Date: 12/10/2018

## 2018-12-10 NOTE — PATIENT INSTRUCTIONS
Sciatica: Exercises  Your Care Instructions  Here are some examples of typical rehabilitation exercises for your condition. Start each exercise slowly. Ease off the exercise if you start to have pain. Your doctor or physical therapist will tell you when you can start these exercises and which ones will work best for you. When you are not being active, find a comfortable position for rest. Some people are comfortable on the floor or a medium-firm bed with a small pillow under their head and another under their knees. Some people prefer to lie on their side with a pillow between their knees. Don't stay in one position for too long. Take short walks (10 to 20 minutes) every 2 to 3 hours. Avoid slopes, hills, and stairs until you feel better. Walk only distances you can manage without pain, especially leg pain. How to do the exercises  Back stretches    1. Get down on your hands and knees on the floor. 2. Relax your head and allow it to droop. Round your back up toward the ceiling until you feel a nice stretch in your upper, middle, and lower back. Hold this stretch for as long as it feels comfortable, or about 15 to 30 seconds. 3. Return to the starting position with a flat back while you are on your hands and knees. 4. Let your back sway by pressing your stomach toward the floor. Lift your buttocks toward the ceiling. 5. Hold this position for 15 to 30 seconds. 6. Repeat 2 to 4 times. Follow-up care is a key part of your treatment and safety. Be sure to make and go to all appointments, and call your doctor if you are having problems. It's also a good idea to know your test results and keep a list of the medicines you take. Where can you learn more? Go to http://jude-kae.info/. Enter X764 in the search box to learn more about \"Sciatica: Exercises. \"  Current as of: November 29, 2017  Content Version: 11.8  © 3910-6956 [employer]wise, Inc[employer].  Care instructions adapted under license by [address] (which disclaims liability or warranty for this information). If you have questions about a medical condition or this instruction, always ask your healthcare professional. [address] any warranty or liability for your use of this information. Low Back Pain: Exercises  Your Care Instructions  Here are some examples of typical rehabilitation exercises for your condition. Start each exercise slowly. Ease off the exercise if you start to have pain. Your doctor or physical therapist will tell you when you can start these exercises and which ones will work best for you. How to do the exercises  Press-up    7. Lie on your stomach, supporting your body with your forearms. 8. Press your elbows down into the floor to raise your upper back. As you do this, relax your stomach muscles and allow your back to arch without using your back muscles. As your press up, do not let your hips or pelvis come off the floor. 9. Hold for 15 to 30 seconds, then relax. 10. Repeat 2 to 4 times. Alternate arm and leg (bird dog) exercise    1. Start on the floor, on your hands and knees. 2. Tighten your belly muscles. 3. Raise one leg off the floor, and hold it straight out behind you. Be careful not to let your hip drop down, because that will twist your trunk. 4. Hold for about 6 seconds, then lower your leg and switch to the other leg. 5. Repeat 8 to 12 times on each leg. 6. Over time, work up to holding for 10 to 30 seconds each time. 7. If you feel stable and secure with your leg raised, try raising the opposite arm straight out in front of you at the same time. Knee-to-chest exercise    1. Lie on your back with your knees bent and your feet flat on the floor. 2. Bring one knee to your chest, keeping the other foot flat on the floor (or keeping the other leg straight, whichever feels better on your lower back). 3. Keep your lower back pressed to the floor. Hold for at least 15 to 30 seconds. 4. Relax, and lower the knee to the starting position. 5. Repeat with the other leg. Repeat 2 to 4 times with each leg. 6. To get more stretch, put your other leg flat on the floor while pulling your knee to your chest.    Curl-ups    1. Lie on the floor on your back with your knees bent at a 90-degree angle. Your feet should be flat on the floor, about 12 inches from your buttocks. 2. Cross your arms over your chest. If this bothers your neck, try putting your hands behind your neck (not your head), with your elbows spread apart. 3. Slowly tighten your belly muscles and raise your shoulder blades off the floor. 4. Keep your head in line with your body, and do not press your chin to your chest.  5. Hold this position for 1 or 2 seconds, then slowly lower yourself back down to the floor. 6. Repeat 8 to 12 times. Pelvic tilt exercise    1. Lie on your back with your knees bent. 2. \"Brace\" your stomach. This means to tighten your muscles by pulling in and imagining your belly button moving toward your spine. You should feel like your back is pressing to the floor and your hips and pelvis are rocking back. 3. Hold for about 6 seconds while you breathe smoothly. 4. Repeat 8 to 12 times. Heel dig bridging    1. Lie on your back with both knees bent and your ankles bent so that only your heels are digging into the floor. Your knees should be bent about 90 degrees. 2. Then push your heels into the floor, squeeze your buttocks, and lift your hips off the floor until your shoulders, hips, and knees are all in a straight line. 3. Hold for about 6 seconds as you continue to breathe normally, and then slowly lower your hips back down to the floor and rest for up to 10 seconds. 4. Do 8 to 12 repetitions. Hamstring stretch in doorway    1. Lie on your back in a doorway, with one leg through the open door. 2. Slide your leg up the wall to straighten your knee.  You should feel a gentle stretch down the back of your leg. 3. Hold the stretch for at least 15 to 30 seconds. Do not arch your back, point your toes, or bend either knee. Keep one heel touching the floor and the other heel touching the wall. 4. Repeat with your other leg. 5. Do 2 to 4 times for each leg. Hip flexor stretch    1. Kneel on the floor with one knee bent and one leg behind you. Place your forward knee over your foot. Keep your other knee touching the floor. 2. Slowly push your hips forward until you feel a stretch in the upper thigh of your rear leg. 3. Hold the stretch for at least 15 to 30 seconds. Repeat with your other leg. 4. Do 2 to 4 times on each side. Wall sit    1. Stand with your back 10 to 12 inches away from a wall. 2. Lean into the wall until your back is flat against it. 3. Slowly slide down until your knees are slightly bent, pressing your lower back into the wall. 4. Hold for about 6 seconds, then slide back up the wall. 5. Repeat 8 to 12 times. Follow-up care is a key part of your treatment and safety. Be sure to make and go to all appointments, and call your doctor if you are having problems. It's also a good idea to know your test results and keep a list of the medicines you take. Where can you learn more? Go to http://jude-kae.info/. Enter I690 in the search box to learn more about \"Low Back Pain: Exercises. \"  Current as of: November 29, 2017  Content Version: 11.8  © 3052-6704 [employer]wise, Incorporated. Care instructions adapted under license by [employer] (which disclaims liability or warranty for this information). If you have questions about a medical condition or this instruction, always ask your healthcare professional. [address] any warranty or liability for your use of this information.

## 2019-01-07 ENCOUNTER — TELEPHONE (OUTPATIENT)
Dept: FAMILY MEDICINE CLINIC | Age: 44
End: 2019-01-07

## 2019-01-09 NOTE — TELEPHONE ENCOUNTER
Medication: Advair 100-50 mcg, dose: 1 inhalation, how often: twice daily, current number of medication days provided: 90, refill per application. Lot #: [id number], EXP 01/2020. This medication was received and verified for the following 1. Correct Patient, 2. Correct Diagnosis, 3. Correct Drug, 4. Correct route, and no current allergy to medication. Please contact patient to come  their medications.      Vesta Sol, MSN, RN, [facility]

## 2019-01-24 ENCOUNTER — TELEPHONE (OUTPATIENT)
Dept: FAMILY MEDICINE CLINIC | Age: 44
End: 2019-01-24

## 2019-01-24 NOTE — TELEPHONE ENCOUNTER
Medication: Linzess , dose: 145mg, how often: every day before breakfast , current number of medication days provided: 90, refill per application. Lot #: [id number], EXP 05/2020    This medication was received and verified for the following 1. Correct Patient, 2. Correct Diagnosis, 3. Correct Drug, 4. Correct route, and no current allergy to medication.

## 2019-04-29 DIAGNOSIS — R06.02 SOB (SHORTNESS OF BREATH): ICD-10-CM

## 2019-04-29 DIAGNOSIS — Z72.0 TOBACCO ABUSE: ICD-10-CM

## 2019-04-29 DIAGNOSIS — E78.2 MIXED HYPERLIPIDEMIA: ICD-10-CM

## 2019-04-29 DIAGNOSIS — I10 HTN, GOAL BELOW 140/90: ICD-10-CM

## 2019-04-29 DIAGNOSIS — N52.9 ERECTILE DYSFUNCTION, UNSPECIFIED ERECTILE DYSFUNCTION TYPE: ICD-10-CM

## 2019-04-29 RX ORDER — TADALAFIL 20 MG/1
20 TABLET ORAL AS NEEDED
Qty: 30 TAB | Refills: 3 | Status: SHIPPED | OUTPATIENT
Start: 2019-04-29 | End: 2019-06-10 | Stop reason: SDUPTHER

## 2019-04-29 RX ORDER — ALBUTEROL SULFATE 90 UG/1
2 AEROSOL, METERED RESPIRATORY (INHALATION)
Qty: 1 INHALER | Refills: 6 | Status: SHIPPED | OUTPATIENT
Start: 2019-04-29 | End: 2019-06-10 | Stop reason: SDUPTHER

## 2019-04-29 RX ORDER — AMLODIPINE BESYLATE 5 MG/1
5 TABLET ORAL DAILY
Qty: 30 TAB | Refills: 6 | Status: SHIPPED | OUTPATIENT
Start: 2019-04-29 | End: 2019-06-10 | Stop reason: SDUPTHER

## 2019-04-29 RX ORDER — ATORVASTATIN CALCIUM 10 MG/1
10 TABLET, FILM COATED ORAL DAILY
Qty: 30 TAB | Refills: 6 | Status: SHIPPED | OUTPATIENT
Start: 2019-04-29 | End: 2019-06-10 | Stop reason: SDUPTHER

## 2019-04-29 RX ORDER — AMLODIPINE BESYLATE AND ATORVASTATIN CALCIUM 5; 10 MG/1; MG/1
1 TABLET, FILM COATED ORAL DAILY
Qty: 90 TAB | Refills: 3 | Status: CANCELLED | OUTPATIENT
Start: 2019-04-29

## 2019-06-06 ENCOUNTER — LAB ONLY (OUTPATIENT)
Dept: FAMILY MEDICINE CLINIC | Age: 44
End: 2019-06-06

## 2019-06-06 ENCOUNTER — HOSPITAL ENCOUNTER (OUTPATIENT)
Dept: LAB | Age: 44
Discharge: HOME | End: 2019-06-06

## 2019-06-06 DIAGNOSIS — I10 ESSENTIAL HYPERTENSION: ICD-10-CM

## 2019-06-06 DIAGNOSIS — I10 HTN, GOAL BELOW 140/90: Primary | ICD-10-CM

## 2019-06-06 DIAGNOSIS — Z12.5 PROSTATE CANCER SCREENING: ICD-10-CM

## 2019-06-06 LAB
ALBUMIN SERPL-MCNC: 3.4 G/DL (ref 3.4–5)
ALBUMIN/GLOB SERPL: 1.1 {RATIO} (ref 0.8–1.7)
ALP SERPL-CCNC: 107 U/L (ref 45–117)
ALT SERPL-CCNC: 22 U/L (ref 16–61)
ANION GAP SERPL CALC-SCNC: 7 MMOL/L (ref 3–18)
AST SERPL-CCNC: 18 U/L (ref 15–37)
BILIRUB SERPL-MCNC: 0.2 MG/DL (ref 0.2–1)
BUN SERPL-MCNC: 7 MG/DL (ref 7–18)
BUN/CREAT SERPL: 7 (ref 12–20)
CALCIUM SERPL-MCNC: 8.9 MG/DL (ref 8.5–10.1)
CHLORIDE SERPL-SCNC: 103 MMOL/L (ref 100–108)
CHOLEST SERPL-MCNC: 207 MG/DL
CO2 SERPL-SCNC: 29 MMOL/L (ref 21–32)
CREAT SERPL-MCNC: 0.98 MG/DL (ref 0.6–1.3)
GLOBULIN SER CALC-MCNC: 3.1 G/DL (ref 2–4)
GLUCOSE SERPL-MCNC: 150 MG/DL (ref 74–99)
HDLC SERPL-MCNC: 46 MG/DL (ref 40–60)
HDLC SERPL: 4.5 {RATIO} (ref 0–5)
LDLC SERPL CALC-MCNC: 124.2 MG/DL (ref 0–100)
LIPID PROFILE,FLP: ABNORMAL
POTASSIUM SERPL-SCNC: 4.2 MMOL/L (ref 3.5–5.5)
PROT SERPL-MCNC: 6.5 G/DL (ref 6.4–8.2)
PSA SERPL-MCNC: 0.2 NG/ML (ref 0–4)
SODIUM SERPL-SCNC: 139 MMOL/L (ref 136–145)
TRIGL SERPL-MCNC: 184 MG/DL (ref ?–150)
VLDLC SERPL CALC-MCNC: 36.8 MG/DL

## 2019-06-06 PROCEDURE — 80053 COMPREHEN METABOLIC PANEL: CPT

## 2019-06-06 PROCEDURE — 84153 ASSAY OF PSA TOTAL: CPT

## 2019-06-06 PROCEDURE — 80061 LIPID PANEL: CPT

## 2019-06-10 ENCOUNTER — OFFICE VISIT (OUTPATIENT)
Dept: FAMILY MEDICINE CLINIC | Age: 44
End: 2019-06-10

## 2019-06-10 VITALS
HEIGHT: 73 IN | RESPIRATION RATE: 20 BRPM | DIASTOLIC BLOOD PRESSURE: 93 MMHG | WEIGHT: 220.6 LBS | HEART RATE: 79 BPM | SYSTOLIC BLOOD PRESSURE: 143 MMHG | BODY MASS INDEX: 29.24 KG/M2 | TEMPERATURE: 98.3 F | OXYGEN SATURATION: 95 %

## 2019-06-10 DIAGNOSIS — I10 HTN, GOAL BELOW 140/90: Primary | ICD-10-CM

## 2019-06-10 DIAGNOSIS — Z12.11 COLON CANCER SCREENING: ICD-10-CM

## 2019-06-10 DIAGNOSIS — N52.9 ERECTILE DYSFUNCTION, UNSPECIFIED ERECTILE DYSFUNCTION TYPE: ICD-10-CM

## 2019-06-10 DIAGNOSIS — R06.02 SOB (SHORTNESS OF BREATH): ICD-10-CM

## 2019-06-10 DIAGNOSIS — K59.03 DRUG-INDUCED CONSTIPATION: ICD-10-CM

## 2019-06-10 DIAGNOSIS — Z72.0 TOBACCO ABUSE: ICD-10-CM

## 2019-06-10 DIAGNOSIS — F17.200 CURRENT SMOKER: ICD-10-CM

## 2019-06-10 DIAGNOSIS — F11.10 HEROIN ABUSE (HCC): ICD-10-CM

## 2019-06-10 DIAGNOSIS — E78.2 MIXED HYPERLIPIDEMIA: ICD-10-CM

## 2019-06-10 RX ORDER — IBUPROFEN 200 MG
1 TABLET ORAL EVERY 24 HOURS
Qty: 30 PATCH | Refills: 0 | Status: SHIPPED | OUTPATIENT
Start: 2019-06-10 | End: 2019-07-10

## 2019-06-10 RX ORDER — IBUPROFEN 200 MG
1 TABLET ORAL EVERY 24 HOURS
Qty: 30 PATCH | Refills: 0 | Status: SHIPPED | OUTPATIENT
Start: 2019-06-10 | End: 2019-06-10 | Stop reason: SDUPTHER

## 2019-06-10 RX ORDER — TADALAFIL 20 MG/1
20 TABLET ORAL AS NEEDED
Qty: 30 TAB | Refills: 3 | Status: SHIPPED | OUTPATIENT
Start: 2019-06-10

## 2019-06-10 RX ORDER — AMLODIPINE BESYLATE 5 MG/1
5 TABLET ORAL DAILY
Qty: 30 TAB | Refills: 6 | Status: SHIPPED | OUTPATIENT
Start: 2019-06-10 | End: 2020-03-07

## 2019-06-10 RX ORDER — ALBUTEROL SULFATE 90 UG/1
2 AEROSOL, METERED RESPIRATORY (INHALATION)
Qty: 1 INHALER | Refills: 6 | Status: SHIPPED | OUTPATIENT
Start: 2019-06-10 | End: 2020-03-07

## 2019-06-10 RX ORDER — FLUTICASONE PROPIONATE AND SALMETEROL 100; 50 UG/1; UG/1
1 POWDER RESPIRATORY (INHALATION) EVERY 12 HOURS
Qty: 3 INHALER | Refills: 0 | Status: SHIPPED | OUTPATIENT
Start: 2019-06-10 | End: 2020-03-07

## 2019-06-10 RX ORDER — ATORVASTATIN CALCIUM 20 MG/1
20 TABLET, FILM COATED ORAL DAILY
Qty: 30 TAB | Refills: 6 | Status: SHIPPED | OUTPATIENT
Start: 2019-06-10 | End: 2020-03-07

## 2019-06-10 NOTE — PROGRESS NOTES
06/10/19    PCP: Himanshu Perez NP    Chief Complaint   Patient presents with    Follow Up Chronic Condition        HISTORY OF PRESENT ILLNESS  Vicenta Glass  is a 39 y.o. male whom presents for Follow Up Chronic Condition          Patient Active Problem List    Diagnosis Date Noted    Erectile dysfunction 01/15/2018    Cigarette smoker 01/15/2018    Refused influenza vaccine 10/10/2017    HTN, goal below 140/90 08/15/2017     Current Outpatient Medications   Medication Sig Dispense Refill    atorvastatin (LIPITOR) 20 mg tablet Take 1 Tab by mouth daily. 30 Tab 6    albuterol (PROVENTIL HFA, VENTOLIN HFA, PROAIR HFA) 90 mcg/actuation inhaler Take 2 Puffs by inhalation every four (4) hours as needed for Shortness of Breath. 1 Inhaler 6    fluticasone propion-salmeterol (ADVAIR/WIXELA) 100-50 mcg/dose diskus inhaler Take 1 Puff by inhalation every twelve (12) hours. 3 Inhaler 0    amLODIPine (NORVASC) 5 mg tablet Take 1 Tab by mouth daily. 30 Tab 6    linaclotide (LINZESS) 145 mcg cap capsule Take 1 Cap by mouth Daily (before breakfast). 90 Cap 3    tadalafil (CIALIS) 20 mg tablet Take 1 Tab by mouth as needed (ED). Indications: Inability to have an Erection 30 Tab 3    nicotine (NICODERM CQ) 21 mg/24 hr 1 Patch by TransDERmal route every twenty-four (24) hours for 30 days. Indications: Stop Smoking 30 Patch 0    fluticasone (FLONASE) 50 mcg/actuation nasal spray 2 Sprays by Both Nostrils route daily. 1 Bottle 0    METHADONE HCL (METHADONE PO) Take 85 mg by mouth daily.        No Known Allergies  Past Medical History:   Diagnosis Date    ADD (attention deficit disorder)     Depression     Heroin abuse ([address]     Started age 14 years, on methadone clinic     Hypertension      Past Surgical History:   Procedure Laterality Date    HX HERNIA REPAIR      surgery as infant     Family History   Problem Relation Age of Onset    Depression Mother     Migraines Mother     Diabetes Mother    [facility] Arthritis-osteo Mother     Alcohol abuse Father     Hypertension Father     High Cholesterol Father     Arthritis-osteo Father     Alzheimer Maternal Grandmother     Eczema Maternal Grandmother      Social History     Tobacco Use    Smoking status: Current Every Day Smoker     Packs/day: 0.50     Years: 25.00     Pack years: 12.50    Smokeless tobacco: Never Used   Substance Use Topics    Alcohol use: Yes     Alcohol/week: 0.6 oz     Types: 1 Cans of beer per week     Comment: rarely       Review of Systems   Constitutional: Negative. HENT: Negative. Eyes: Negative. Respiratory: Positive for cough and shortness of breath. Negative for hemoptysis, sputum production and wheezing. Smokes 1 ppd. Reports he is not using his Advair BID as prescribed. Needs refills of inhalers. Cardiovascular: Negative. Gastrointestinal: Positive for blood in stool (Blood on tissue and stool ), constipation and nausea. Negative for abdominal pain, diarrhea, heartburn and vomiting. Genitourinary: Negative. Skin: Positive for itching and rash. Reports little itchy bumps in his head. Neurological: Negative. Endo/Heme/Allergies: Negative. Psychiatric/Behavioral: Positive for substance abuse (Patient continues to use Heroin on Methadone program. ). Negative for hallucinations, memory loss and suicidal ideas. The patient is nervous/anxious. The patient does not have insomnia. Reports stress and dad being hospitalized for COPD and Heart Failure. Visit Vitals  BP (!) 143/93   Pulse 79   Temp 98.3 °F (36.8 °C)   Resp 20   Ht 6' 1\" (1.854 m)   Wt 220 lb 9.6 oz (100.1 kg)   SpO2 95%   BMI 29.10 kg/m²       Pain Scale: 0 - No pain/10    Pain Location:      Physical Exam   Constitutional: He is oriented to person, place, and time and well-developed, well-nourished, and in no distress. HENT:   Head: Normocephalic. Eyes: Pupils are equal, round, and reactive to light.    Neck: Normal range of motion. Neck supple. Cardiovascular: Normal rate, regular rhythm and normal heart sounds. Pulmonary/Chest: Effort normal and breath sounds normal.   Abdominal: Soft. He exhibits distension. Bowel sounds are hypoactive. There is no tenderness. Musculoskeletal: Normal range of motion. He exhibits no edema. Neurological: He is alert and oriented to person, place, and time. Skin: Skin is warm and dry. 1-2 small papular bumps in scalp that do not look infected. No specific rash noted. Psychiatric: Affect normal. His mood appears anxious. Hyperactive with flighty ideas. Vitals reviewed. Lab Results   Component Value Date/Time    WBC 9.2 06/07/2018 08:34 AM    HGB 13.6 06/07/2018 08:34 AM    HCT 42.2 06/07/2018 08:34 AM    PLATELET 245 15/82/9689 08:34 AM    MCV 88.5 06/07/2018 08:34 AM     Lab Results   Component Value Date/Time    Glucose 150 (H) 06/06/2019 07:03 AM    LDL, calculated 124.2 (H) 06/06/2019 07:03 AM    Creatinine 0.98 06/06/2019 07:03 AM      Lab Results   Component Value Date/Time    Cholesterol, total 207 (H) 06/06/2019 07:03 AM    HDL Cholesterol 46 06/06/2019 07:03 AM    LDL, calculated 124.2 (H) 06/06/2019 07:03 AM    Triglyceride 184 (H) 06/06/2019 07:03 AM    CHOL/HDL Ratio 4.5 06/06/2019 07:03 AM     Lab Results   Component Value Date/Time    Sodium 139 06/06/2019 07:03 AM    Potassium 4.2 06/06/2019 07:03 AM    Chloride 103 06/06/2019 07:03 AM    CO2 29 06/06/2019 07:03 AM    Anion gap 7 06/06/2019 07:03 AM    Glucose 150 (H) 06/06/2019 07:03 AM    BUN 7 06/06/2019 07:03 AM    Creatinine 0.98 06/06/2019 07:03 AM    BUN/Creatinine ratio 7 (L) 06/06/2019 07:03 AM    GFR est AA >60 06/06/2019 07:03 AM    GFR est non-AA >60 06/06/2019 07:03 AM    Calcium 8.9 06/06/2019 07:03 AM    Bilirubin, total 0.2 06/06/2019 07:03 AM    ALT (SGPT) 22 06/06/2019 07:03 AM    AST (SGOT) 18 06/06/2019 07:03 AM    Alk.  phosphatase 107 06/06/2019 07:03 AM    Protein, total 6.5 06/06/2019 07:03 AM    Albumin 3.4 06/06/2019 07:03 AM    Globulin 3.1 06/06/2019 07:03 AM    A-G Ratio 1.1 06/06/2019 07:03 AM           ASSESSMENT and PLAN    ICD-10-CM ICD-9-CM    1. HTN, goal below 140/90 I10 401.9    2. Mixed hyperlipidemia E78.2 272.2 atorvastatin (LIPITOR) 20 mg tablet   3. Current smoker F17.200 305.1 DISCONTINUED: nicotine (NICODERM CQ) 21 mg/24 hr   4. Colon cancer screening Z12.11 V76.51 OCCULT BLOOD IMMUNOASSAY,DIAGNOSTIC      OCCULT BLOOD IMMUNOASSAY,DIAGNOSTIC   5. Drug-induced constipation K59.03 564.09 linaclotide (LINZESS) 145 mcg cap capsule     E980.5     Patient on Methadone    6. Tobacco abuse Z72.0 305.1 albuterol (PROVENTIL HFA, VENTOLIN HFA, PROAIR HFA) 90 mcg/actuation inhaler      nicotine (NICODERM CQ) 21 mg/24 hr   7. SOB (shortness of breath) R06.02 786.05 albuterol (PROVENTIL HFA, VENTOLIN HFA, PROAIR HFA) 90 mcg/actuation inhaler      fluticasone propion-salmeterol (ADVAIR/WIXELA) 100-50 mcg/dose diskus inhaler      nicotine (NICODERM CQ) 21 mg/24 hr   8. Erectile dysfunction, unspecified erectile dysfunction type N52.9 607.84 tadalafil (CIALIS) 20 mg tablet   9. Heroin abuse ([facility] F11.10 305.50     Patient on Methadone program      Diagnoses and all orders for this visit:    1. HTN, goal below 140/90    2. Mixed hyperlipidemia  -     atorvastatin (LIPITOR) 20 mg tablet; Take 1 Tab by mouth daily. 3. Current smoker    4. Colon cancer screening  -     OCCULT BLOOD IMMUNOASSAY,DIAGNOSTIC; Future  -     OCCULT BLOOD IMMUNOASSAY,DIAGNOSTIC; Future    5. Drug-induced constipation  Comments:  Patient on Methadone   Orders:  -     linaclotide (LINZESS) 145 mcg cap capsule; Take 1 Cap by mouth Daily (before breakfast). 6. Tobacco abuse  -     albuterol (PROVENTIL HFA, VENTOLIN HFA, PROAIR HFA) 90 mcg/actuation inhaler; Take 2 Puffs by inhalation every four (4) hours as needed for Shortness of Breath.   -     nicotine (NICODERM CQ) 21 mg/24 hr; 1 Patch by TransDERmal route every twenty-four (24) hours for 30 days. Indications: Stop Smoking    7. SOB (shortness of breath)  -     albuterol (PROVENTIL HFA, VENTOLIN HFA, PROAIR HFA) 90 mcg/actuation inhaler; Take 2 Puffs by inhalation every four (4) hours as needed for Shortness of Breath. -     fluticasone propion-salmeterol (ADVAIR/WIXELA) 100-50 mcg/dose diskus inhaler; Take 1 Puff by inhalation every twelve (12) hours. -     nicotine (NICODERM CQ) 21 mg/24 hr; 1 Patch by TransDERmal route every twenty-four (24) hours for 30 days. Indications: Stop Smoking    8. Erectile dysfunction, unspecified erectile dysfunction type  -     tadalafil (CIALIS) 20 mg tablet; Take 1 Tab by mouth as needed (ED). Indications: Inability to have an Erection    9. Heroin abuse ([facility]  Comments:  Patient on Methadone program     Other orders  -     amLODIPine (NORVASC) 5 mg tablet; Take 1 Tab by mouth daily. Medications Discontinued During This Encounter   Medication Reason    methylPREDNISolone (MEDROL DOSEPACK) 4 mg tablet Therapy Completed    dexlansoprazole (DEXILANT) 30 mg capsule Side Effects    atorvastatin (LIPITOR) 10 mg tablet Reorder    albuterol (PROVENTIL HFA, VENTOLIN HFA, PROAIR HFA) 90 mcg/actuation inhaler Reorder    fluticasone-salmeterol (ADVAIR) 100-50 mcg/dose diskus inhaler Reorder    amLODIPine (NORVASC) 5 mg tablet Reorder    linaclotide (LINZESS) 145 mcg cap capsule Reorder    tadalafil (CIALIS) 20 mg tablet Reorder    nicotine (NICODERM CQ) 21 mg/24 hr Reorder       Written instructions followed our verbal discussion of all information discussed above, pending tests ordered and future goals/plans. Patient expressed understanding of current diagnosis, planned testing, follow up and if needed to contact the office for any questions or concerns prior to the next visit.

## 2019-06-10 NOTE — PROGRESS NOTES
Patient concern with bumps in his head that itch and get irritated. Patient concern with blood in his stool. No blood pressure medication in months.

## 2019-06-19 ENCOUNTER — APPOINTMENT (OUTPATIENT)
Dept: GENERAL RADIOLOGY | Age: 44
End: 2019-06-19
Attending: EMERGENCY MEDICINE
Payer: COMMERCIAL

## 2019-06-19 ENCOUNTER — APPOINTMENT (OUTPATIENT)
Dept: CT IMAGING | Age: 44
End: 2019-06-19
Attending: EMERGENCY MEDICINE
Payer: COMMERCIAL

## 2019-06-19 ENCOUNTER — HOSPITAL ENCOUNTER (EMERGENCY)
Age: 44
Discharge: HOME | End: 2019-06-19
Attending: EMERGENCY MEDICINE
Payer: COMMERCIAL

## 2019-06-19 VITALS
DIASTOLIC BLOOD PRESSURE: 82 MMHG | TEMPERATURE: 101.9 F | BODY MASS INDEX: 29.42 KG/M2 | RESPIRATION RATE: 20 BRPM | HEART RATE: 89 BPM | WEIGHT: 222 LBS | OXYGEN SATURATION: 96 % | SYSTOLIC BLOOD PRESSURE: 143 MMHG | HEIGHT: 73 IN

## 2019-06-19 DIAGNOSIS — R07.9 ACUTE CHEST PAIN: Primary | ICD-10-CM

## 2019-06-19 DIAGNOSIS — J18.9 PNEUMONIA OF RIGHT UPPER LOBE DUE TO INFECTIOUS ORGANISM: ICD-10-CM

## 2019-06-19 LAB
ALBUMIN SERPL-MCNC: 3.4 G/DL (ref 3.4–5)
ALBUMIN/GLOB SERPL: 1.3 {RATIO} (ref 0.8–1.7)
ALP SERPL-CCNC: 87 U/L (ref 45–117)
ALT SERPL-CCNC: 19 U/L (ref 16–61)
ANION GAP SERPL CALC-SCNC: 5 MMOL/L (ref 3–18)
AST SERPL-CCNC: 12 U/L (ref 15–37)
BASOPHILS # BLD: 0 K/UL (ref 0–0.1)
BASOPHILS NFR BLD: 0 % (ref 0–2)
BILIRUB DIRECT SERPL-MCNC: <0.1 MG/DL (ref 0–0.2)
BILIRUB SERPL-MCNC: 0.2 MG/DL (ref 0.2–1)
BUN SERPL-MCNC: 8 MG/DL (ref 7–18)
BUN/CREAT SERPL: 8 (ref 12–20)
CALCIUM SERPL-MCNC: 9.2 MG/DL (ref 8.5–10.1)
CHLORIDE SERPL-SCNC: 102 MMOL/L (ref 100–108)
CK MB CFR SERPL CALC: 1.3 % (ref 0–4)
CK MB SERPL-MCNC: 2.1 NG/ML (ref 5–25)
CK SERPL-CCNC: 168 U/L (ref 39–308)
CO2 SERPL-SCNC: 32 MMOL/L (ref 21–32)
CREAT SERPL-MCNC: 0.95 MG/DL (ref 0.6–1.3)
D DIMER PPP FEU-MCNC: <0.27 UG/ML(FEU)
DIFFERENTIAL METHOD BLD: ABNORMAL
EOSINOPHIL # BLD: 0.3 K/UL (ref 0–0.4)
EOSINOPHIL NFR BLD: 3 % (ref 0–5)
ERYTHROCYTE [DISTWIDTH] IN BLOOD BY AUTOMATED COUNT: 14.5 % (ref 11.6–14.5)
GLOBULIN SER CALC-MCNC: 2.7 G/DL (ref 2–4)
GLUCOSE SERPL-MCNC: 80 MG/DL (ref 74–99)
HCT VFR BLD AUTO: 40.5 % (ref 36–48)
HGB BLD-MCNC: 13.2 G/DL (ref 13–16)
LIPASE SERPL-CCNC: 55 U/L (ref 73–393)
LYMPHOCYTES # BLD: 1.3 K/UL (ref 0.9–3.6)
LYMPHOCYTES NFR BLD: 10 % (ref 21–52)
MCH RBC QN AUTO: 28.6 PG (ref 24–34)
MCHC RBC AUTO-ENTMCNC: 32.6 G/DL (ref 31–37)
MCV RBC AUTO: 87.7 FL (ref 74–97)
MONOCYTES # BLD: 1.1 K/UL (ref 0.05–1.2)
MONOCYTES NFR BLD: 8 % (ref 3–10)
NEUTS SEG # BLD: 10.1 K/UL (ref 1.8–8)
NEUTS SEG NFR BLD: 79 % (ref 40–73)
PLATELET # BLD AUTO: 402 K/UL (ref 135–420)
PMV BLD AUTO: 9.2 FL (ref 9.2–11.8)
POTASSIUM SERPL-SCNC: 4.3 MMOL/L (ref 3.5–5.5)
PROT SERPL-MCNC: 6.1 G/DL (ref 6.4–8.2)
RBC # BLD AUTO: 4.62 M/UL (ref 4.7–5.5)
SODIUM SERPL-SCNC: 139 MMOL/L (ref 136–145)
TROPONIN I BLD-MCNC: <0.04 NG/ML (ref 0–0.08)
TROPONIN I SERPL-MCNC: <0.02 NG/ML (ref 0–0.04)
WBC # BLD AUTO: 12.7 K/UL (ref 4.6–13.2)

## 2019-06-19 PROCEDURE — 71275 CT ANGIOGRAPHY CHEST: CPT

## 2019-06-19 PROCEDURE — 74011250636 HC RX REV CODE- 250/636: Performed by: EMERGENCY MEDICINE

## 2019-06-19 PROCEDURE — 80048 BASIC METABOLIC PNL TOTAL CA: CPT

## 2019-06-19 PROCEDURE — 80076 HEPATIC FUNCTION PANEL: CPT

## 2019-06-19 PROCEDURE — 96374 THER/PROPH/DIAG INJ IV PUSH: CPT

## 2019-06-19 PROCEDURE — 83690 ASSAY OF LIPASE: CPT

## 2019-06-19 PROCEDURE — 84484 ASSAY OF TROPONIN QUANT: CPT

## 2019-06-19 PROCEDURE — 71045 X-RAY EXAM CHEST 1 VIEW: CPT

## 2019-06-19 PROCEDURE — 74011000250 HC RX REV CODE- 250: Performed by: EMERGENCY MEDICINE

## 2019-06-19 PROCEDURE — 99285 EMERGENCY DEPT VISIT HI MDM: CPT

## 2019-06-19 PROCEDURE — 74011636320 HC RX REV CODE- 636/320: Performed by: EMERGENCY MEDICINE

## 2019-06-19 PROCEDURE — 85379 FIBRIN DEGRADATION QUANT: CPT

## 2019-06-19 PROCEDURE — 82550 ASSAY OF CK (CPK): CPT

## 2019-06-19 PROCEDURE — 85025 COMPLETE CBC W/AUTO DIFF WBC: CPT

## 2019-06-19 PROCEDURE — 93005 ELECTROCARDIOGRAM TRACING: CPT

## 2019-06-19 PROCEDURE — 74011250637 HC RX REV CODE- 250/637: Performed by: EMERGENCY MEDICINE

## 2019-06-19 RX ORDER — IBUPROFEN 400 MG/1
800 TABLET ORAL
Status: DISCONTINUED | OUTPATIENT
Start: 2019-06-19 | End: 2019-06-19 | Stop reason: SDUPTHER

## 2019-06-19 RX ORDER — FENTANYL CITRATE 50 UG/ML
50 INJECTION, SOLUTION INTRAMUSCULAR; INTRAVENOUS
Status: COMPLETED | OUTPATIENT
Start: 2019-06-19 | End: 2019-06-19

## 2019-06-19 RX ORDER — IBUPROFEN 400 MG/1
800 TABLET ORAL
Status: COMPLETED | OUTPATIENT
Start: 2019-06-19 | End: 2019-06-19

## 2019-06-19 RX ORDER — DOXYCYCLINE 100 MG/1
100 CAPSULE ORAL
Status: COMPLETED | OUTPATIENT
Start: 2019-06-19 | End: 2019-06-19

## 2019-06-19 RX ORDER — LIDOCAINE 4 G/100G
1 PATCH TOPICAL EVERY 24 HOURS
Status: DISCONTINUED | OUTPATIENT
Start: 2019-06-19 | End: 2019-06-19 | Stop reason: HOSPADM

## 2019-06-19 RX ORDER — DOXYCYCLINE 100 MG/1
100 CAPSULE ORAL 2 TIMES DAILY
Qty: 20 CAP | Refills: 0 | Status: SHIPPED | OUTPATIENT
Start: 2019-06-19 | End: 2019-06-29

## 2019-06-19 RX ORDER — SUCRALFATE 1 G/1
1 TABLET ORAL ONCE
Status: COMPLETED | OUTPATIENT
Start: 2019-06-19 | End: 2019-06-19

## 2019-06-19 RX ADMIN — FENTANYL CITRATE 50 MCG: 50 INJECTION INTRAMUSCULAR; INTRAVENOUS at 14:48

## 2019-06-19 RX ADMIN — IBUPROFEN 800 MG: 400 TABLET ORAL at 20:07

## 2019-06-19 RX ADMIN — SUCRALFATE 1 G: 1 TABLET ORAL at 14:48

## 2019-06-19 RX ADMIN — IOPAMIDOL 100 ML: 755 INJECTION, SOLUTION INTRAVENOUS at 16:16

## 2019-06-19 RX ADMIN — DOXYCYCLINE HYCLATE 100 MG: 100 CAPSULE ORAL at 20:04

## 2019-06-19 NOTE — ED PROVIDER NOTES
EMERGENCY DEPARTMENT HISTORY AND PHYSICAL EXAM    12:02 PM      Date: 2019  Patient Name: Osorio Pat    History of Presenting Illness     Chief Complaint   Patient presents with    Chest Pain         History Provided By: Patient      Additional History (Context): Osorio Pat is a 39 y.o. male with hypertension who presents to the ED with c/o acute onset of sharp, R sided chest pain since 7 AM this morning. Admits he was driving when the pain began. Notes pain is \"inside rib cage,\" and worsens with breathing. Patient reports he is a  and was working last night. Denies cough or fever. Admits to daily ETOH use and smoking. Denies history of blood clots. No modifying or aggravating factors were reported. No other symptoms or concerns were expressed. PCP: Remy Ambrosio NP    Chief Complaint: Chest pain  Duration:  7 AM today  Timing:  Acute  Location: R sided chest  Quality: Sharp  Severity: 7 out of 10  Modifying Factors: Worse with breathing  Associated Symptoms: denies any other associated signs or symptoms    Current Facility-Administered Medications   Medication Dose Route Frequency Provider Last Rate Last Dose    lidocaine 4 % patch 1 Patch  1 Patch TransDERmal Q24H Dee Jon DO   1 Patch at 19 1249    doxycycline (VIBRAMYCIN) capsule 100 mg  100 mg Oral NOW Dee Jon DO         Current Outpatient Medications   Medication Sig Dispense Refill    doxycycline (MONODOX) 100 mg capsule Take 1 Cap by mouth two (2) times a day for 10 days. 20 Cap 0    atorvastatin (LIPITOR) 20 mg tablet Take 1 Tab by mouth daily. 30 Tab 6    albuterol (PROVENTIL HFA, VENTOLIN HFA, PROAIR HFA) 90 mcg/actuation inhaler Take 2 Puffs by inhalation every four (4) hours as needed for Shortness of Breath. 1 Inhaler 6    fluticasone propion-salmeterol (ADVAIR/WIXELA) 100-50 mcg/dose diskus inhaler Take 1 Puff by inhalation every twelve (12) hours.  3 Inhaler 0    amLODIPine (NORVASC) 5 mg tablet Take 1 Tab by mouth daily. 30 Tab 6    linaclotide (LINZESS) 145 mcg cap capsule Take 1 Cap by mouth Daily (before breakfast). 90 Cap 3    tadalafil (CIALIS) 20 mg tablet Take 1 Tab by mouth as needed (ED). Indications: Inability to have an Erection 30 Tab 3    nicotine (NICODERM CQ) 21 mg/24 hr 1 Patch by TransDERmal route every twenty-four (24) hours for 30 days. Indications: Stop Smoking 30 Patch 0    fluticasone (FLONASE) 50 mcg/actuation nasal spray 2 Sprays by Both Nostrils route daily. 1 Bottle 0    METHADONE HCL (METHADONE PO) Take 85 mg by mouth daily. Past History     Past Medical History:  Past Medical History:   Diagnosis Date    ADD (attention deficit disorder)     Depression     Heroin abuse ([address]     Started age 14 years, on methadone clinic     Hypertension        Past Surgical History:  Past Surgical History:   Procedure Laterality Date    HX HERNIA REPAIR      surgery as infant       Family History:  Family History   Problem Relation Age of Onset    Depression Mother    [facility] Migraines Mother     Diabetes Mother    [facility] Arthritis-osteo Mother     Alcohol abuse Father     Hypertension Father     High Cholesterol Father     Arthritis-osteo Father     Alzheimer Maternal Grandmother     Eczema Maternal Grandmother        Social History:  Social History     Tobacco Use    Smoking status: Current Every Day Smoker     Packs/day: 0.50     Years: 25.00     Pack years: 12.50    Smokeless tobacco: Never Used   Substance Use Topics    Alcohol use: Yes     Alcohol/week: 0.6 oz     Types: 1 Cans of beer per week     Comment: rarely    Drug use: Yes     Types: Heroin       Allergies:  No Known Allergies      Review of Systems       Review of Systems   Constitutional: Negative for activity change, fatigue and fever. HENT: Negative for congestion and rhinorrhea. Eyes: Negative for visual disturbance. Respiratory: Negative for shortness of breath. Cardiovascular: Positive for chest pain. Negative for palpitations. Gastrointestinal: Negative for abdominal pain, diarrhea, nausea and vomiting. Genitourinary: Negative for dysuria and hematuria. Musculoskeletal: Negative for back pain. Skin: Negative for rash. Neurological: Negative for dizziness, weakness and light-headedness. All other systems reviewed and are negative. Physical Exam     Visit Vitals  /81 (BP 1 Location: Left arm, BP Patient Position: At rest)   Pulse 82   Temp 98.5 °F (36.9 °C)   Resp 16   Ht 6' 1\" (1.854 m)   Wt 100.7 kg (222 lb)   SpO2 97%   BMI 29.29 kg/m²         Physical Exam   Constitutional: He is oriented to person, place, and time. He appears well-developed and well-nourished. No distress. HENT:   Head: Normocephalic and atraumatic. Right Ear: External ear normal.   Left Ear: External ear normal.   Nose: Nose normal.   Mouth/Throat: Oropharynx is clear and moist.   Eyes: Pupils are equal, round, and reactive to light. Conjunctivae and EOM are normal.   Neck: Normal range of motion. Neck supple. No tracheal deviation present. Cardiovascular: Normal rate, regular rhythm and intact distal pulses. Pulmonary/Chest: Effort normal and breath sounds normal. He exhibits no tenderness. Abdominal: Soft. Bowel sounds are normal. He exhibits no distension. There is no tenderness. There is no rebound and no guarding. Musculoskeletal: Normal range of motion. He exhibits no edema or tenderness. Neurological: He is alert and oriented to person, place, and time. No cranial nerve deficit. Coordination normal.   Skin: Skin is warm and dry. Psychiatric: He has a normal mood and affect. His behavior is normal. Judgment and thought content normal.   Nursing note and vitals reviewed.         Diagnostic Study Results     Labs -  Recent Results (from the past 12 hour(s))   EKG, 12 LEAD, INITIAL    Collection Time: 06/19/19 11:04 AM   Result Value Ref Range Ventricular Rate 85 BPM    Atrial Rate 85 BPM    P-R Interval 168 ms    QRS Duration 88 ms    Q-T Interval 368 ms    QTC Calculation (Bezet) 437 ms    Calculated P Axis 21 degrees    Calculated R Axis -4 degrees    Calculated T Axis 46 degrees    Diagnosis       Normal sinus rhythm  Possible Left atrial enlargement  Nonspecific T wave abnormality  Abnormal ECG  No previous ECGs available     METABOLIC PANEL, BASIC    Collection Time: 06/19/19 11:08 AM   Result Value Ref Range    Sodium 139 136 - 145 mmol/L    Potassium 4.3 3.5 - 5.5 mmol/L    Chloride 102 100 - 108 mmol/L    CO2 32 21 - 32 mmol/L    Anion gap 5 3.0 - 18 mmol/L    Glucose 80 74 - 99 mg/dL    BUN 8 7.0 - 18 MG/DL    Creatinine 0.95 0.6 - 1.3 MG/DL    BUN/Creatinine ratio 8 (L) 12 - 20      GFR est AA >60 >60 ml/min/1.73m2    GFR est non-AA >60 >60 ml/min/1.73m2    Calcium 9.2 8.5 - 10.1 MG/DL   CBC WITH AUTOMATED DIFF    Collection Time: 06/19/19 11:08 AM   Result Value Ref Range    WBC 12.7 4.6 - 13.2 K/uL    RBC 4.62 (L) 4.70 - 5.50 M/uL    HGB 13.2 13.0 - 16.0 g/dL    HCT 40.5 36.0 - 48.0 %    MCV 87.7 74.0 - 97.0 FL    MCH 28.6 24.0 - 34.0 PG    MCHC 32.6 31.0 - 37.0 g/dL    RDW 14.5 11.6 - 14.5 %    PLATELET 185 207 - 778 K/uL    MPV 9.2 9.2 - 11.8 FL    NEUTROPHILS 79 (H) 40 - 73 %    LYMPHOCYTES 10 (L) 21 - 52 %    MONOCYTES 8 3 - 10 %    EOSINOPHILS 3 0 - 5 %    BASOPHILS 0 0 - 2 %    ABS. NEUTROPHILS 10.1 (H) 1.8 - 8.0 K/UL    ABS. LYMPHOCYTES 1.3 0.9 - 3.6 K/UL    ABS. MONOCYTES 1.1 0.05 - 1.2 K/UL    ABS. EOSINOPHILS 0.3 0.0 - 0.4 K/UL    ABS.  BASOPHILS 0.0 0.0 - 0.1 K/UL    DF AUTOMATED     CARDIAC PANEL,(CK, CKMB & TROPONIN)    Collection Time: 06/19/19 11:08 AM   Result Value Ref Range     39 - 308 U/L    CK - MB 2.1 <3.6 ng/ml    CK-MB Index 1.3 0.0 - 4.0 %    Troponin-I, QT <0.02 0.0 - 0.045 NG/ML   D DIMER    Collection Time: 06/19/19 11:08 AM   Result Value Ref Range    D DIMER <0.27 <0.46 ug/ml(FEU)   POC TROPONIN-I Collection Time: 06/19/19  2:13 PM   Result Value Ref Range    Troponin-I (POC) <0.04 0.00 - 0.08 ng/mL   LIPASE    Collection Time: 06/19/19  3:22 PM   Result Value Ref Range    Lipase 55 (L) 73 - 393 U/L   HEPATIC FUNCTION PANEL    Collection Time: 06/19/19  3:22 PM   Result Value Ref Range    Protein, total 6.1 (L) 6.4 - 8.2 g/dL    Albumin 3.4 3.4 - 5.0 g/dL    Globulin 2.7 2.0 - 4.0 g/dL    A-G Ratio 1.3 0.8 - 1.7      Bilirubin, total 0.2 0.2 - 1.0 MG/DL    Bilirubin, direct <0.1 0.0 - 0.2 MG/DL    Alk. phosphatase 87 45 - 117 U/L    AST (SGOT) 12 (L) 15 - 37 U/L    ALT (SGPT) 19 16 - 61 U/L       Radiologic Studies -   XR CHEST SNGL V   Final Result   IMPRESSION:      No acute chest process. CTA CHEST W OR W WO CONT    (Results Pending)         Medical Decision Making     It should be noted that I, Rose Gottron, DO will be the provider of record for this patient. I reviewed the vital signs, available nursing notes, past medical history, past surgical history, family history and social history. Vital Signs-Reviewed the patient's vital signs. Pulse Oximetry Analysis -  97% on room air, stable. Cardiac Monitor:  Rate: 82  Rhythm:  Normal Sinus Rhythm     EKG: Interpreted by the EP. Time Interpreted: 11:04 AM   Rate: 85   Rhythm: Normal Sinus Rhythm    Interpretation: No acute changes. Records Reviewed: Nursing Notes and Old Medical Records (Time of Review: 12:02 PM)    ED Course: Progress Notes, Reevaluation, and Consults:  2:43 PM: Reevaluated patient. Patient describes pain has worsened. Patient is unable to sit still and does not want a repeat EKG. Will obtain CTA chest and administer pain medications. 4:05 PM: Reevaluated patient. Patient is resting comfortably. 7:00 PM  CTA finally resulted with right upper lobe infiltrate. Provider Notes (Medical Decision Making):   Patient is a 44-year-old male who comes in complaining of right upper chest pain.   Blood work within normal limits. D-dimer negative. Troponin negative. No acute findings on EKG. CTA of chest was done when patient was having more chest pain and is showing right upper lobe pneumonia. Patient will be discharged home with antibiotics and is to follow-up with his doctor. Return if any worsening or concerning symptoms. Understands and agrees with plan. Stable for discharge      Diagnosis     Clinical Impression:   1. Acute chest pain    2. Pneumonia of right upper lobe due to infectious organism [facility])        Disposition: D/C    Follow-up Information     Follow up With Specialties Details Why Contact Info    Sandro Guzman NP Nurse Practitioner Schedule an appointment as soon as possible for a visit  [place]  [phone]             Patient's Medications   Start Taking    DOXYCYCLINE (MONODOX) 100 MG CAPSULE    Take 1 Cap by mouth two (2) times a day for 10 days. Continue Taking    ALBUTEROL (PROVENTIL HFA, VENTOLIN HFA, PROAIR HFA) 90 MCG/ACTUATION INHALER    Take 2 Puffs by inhalation every four (4) hours as needed for Shortness of Breath. AMLODIPINE (NORVASC) 5 MG TABLET    Take 1 Tab by mouth daily. ATORVASTATIN (LIPITOR) 20 MG TABLET    Take 1 Tab by mouth daily. FLUTICASONE (FLONASE) 50 MCG/ACTUATION NASAL SPRAY    2 Sprays by Both Nostrils route daily. FLUTICASONE PROPION-SALMETEROL (ADVAIR/WIXELA) 100-50 MCG/DOSE DISKUS INHALER    Take 1 Puff by inhalation every twelve (12) hours. LINACLOTIDE (LINZESS) 145 MCG CAP CAPSULE    Take 1 Cap by mouth Daily (before breakfast). METHADONE HCL (METHADONE PO)    Take 85 mg by mouth daily. NICOTINE (NICODERM CQ) 21 MG/24 HR    1 Patch by TransDERmal route every twenty-four (24) hours for 30 days. Indications: Stop Smoking    TADALAFIL (CIALIS) 20 MG TABLET    Take 1 Tab by mouth as needed (ED).  Indications: Inability to have an Erection   These Medications have changed    No medications on file   Stop Taking    No medications on file     _______________________________       [address] acting as a scribe for and in the presence of Shanna Reilly DO      June 19, 2019 at 12:02 PM       Provider Attestation:      I personally performed the services described in the documentation, reviewed the documentation, as recorded by the scribe in my presence, and it accurately and completely records my words and actions.  June 19, 2019 at 12:02 PM - Shanna LIRA DO        _______________________________

## 2019-06-19 NOTE — LETTER
NOTIFICATION RETURN TO WORK / SCHOOL 
 
6/19/2019 7:55 PM 
 
Mr. Imtiaz Lentz [address]  [address] [facility] [address] To Whom It May Concern: 
 
Imtiaz Lentz is currently under the care of [facility] EMERGENCY DEPT. He will return to work/school on: 6/24/19 If there are questions or concerns please have the patient contact our office. Sincerely, Dajuan Oropeza, DO

## 2019-06-20 LAB
ATRIAL RATE: 85 BPM
CALCULATED P AXIS, ECG09: 21 DEGREES
CALCULATED R AXIS, ECG10: -4 DEGREES
CALCULATED T AXIS, ECG11: 46 DEGREES
DIAGNOSIS, 93000: NORMAL
P-R INTERVAL, ECG05: 168 MS
Q-T INTERVAL, ECG07: 368 MS
QRS DURATION, ECG06: 88 MS
QTC CALCULATION (BEZET), ECG08: 437 MS
VENTRICULAR RATE, ECG03: 85 BPM

## 2019-06-20 NOTE — DISCHARGE INSTRUCTIONS
Patient Education      Patient armband removed and shredded    Chest Pain: Care Instructions  Your Care Instructions    There are many things that can cause chest pain. Some are not serious and will get better on their own in a few days. But some kinds of chest pain need more testing and treatment. Your doctor may have recommended a follow-up visit in the next 8 to 12 hours. If you are not getting better, you may need more tests or treatment. Even though your doctor has released you, you still need to watch for any problems. The doctor carefully checked you, but sometimes problems can develop later. If you have new symptoms or if your symptoms do not get better, get medical care right away. If you have worse or different chest pain or pressure that lasts more than 5 minutes or you passed out (lost consciousness), call 911 or seek other emergency help right away. A medical visit is only one step in your treatment. Even if you feel better, you still need to do what your doctor recommends, such as going to all suggested follow-up appointments and taking medicines exactly as directed. This will help you recover and help prevent future problems. How can you care for yourself at home? · Rest until you feel better. · Take your medicine exactly as prescribed. Call your doctor if you think you are having a problem with your medicine. · Do not drive after taking a prescription pain medicine. When should you call for help? Call 911 if:    · You passed out (lost consciousness).     · You have severe difficulty breathing.     · You have symptoms of a heart attack. These may include:  ? Chest pain or pressure, or a strange feeling in your chest.  ? Sweating. ? Shortness of breath. ? Nausea or vomiting. ? Pain, pressure, or a strange feeling in your back, neck, jaw, or upper belly or in one or both shoulders or arms. ? Lightheadedness or sudden weakness. ? A fast or irregular heartbeat.   After you call 911, the  may tell you to chew 1 adult-strength or 2 to 4 low-dose aspirin. Wait for an ambulance. Do not try to drive yourself.    Call your doctor today if:    · You have any trouble breathing.     · Your chest pain gets worse.     · You are dizzy or lightheaded, or you feel like you may faint.     · You are not getting better as expected.     · You are having new or different chest pain. Where can you learn more? Go to http://jude-kae.info/. Enter A120 in the search box to learn more about \"Chest Pain: Care Instructions. \"  Current as of: September 23, 2018  Content Version: 11.9  © 2956-3712 [employer]. Care instructions adapted under license by [employer] (which disclaims liability or warranty for this information). If you have questions about a medical condition or this instruction, always ask your healthcare professional. [place] [address] any warranty or liability for your use of this information. Patient Education        Pneumonia: Care Instructions  Your Care Instructions    Pneumonia is an infection of the lungs. Most cases are caused by infections from bacteria or viruses. Pneumonia may be mild or very severe. If it is caused by bacteria, you will be treated with antibiotics. It may take a few weeks to a few months to recover fully from pneumonia, depending on how sick you were and whether your overall health is good. Follow-up care is a key part of your treatment and safety. Be sure to make and go to all appointments, and call your doctor if you are having problems. It's also a good idea to know your test results and keep a list of the medicines you take. How can you care for yourself at home? · Take your antibiotics exactly as directed. Do not stop taking the medicine just because you are feeling better. You need to take the full course of antibiotics. · Take your medicines exactly as prescribed.  Call your doctor if you think you are having a problem with your medicine. · Get plenty of rest and sleep. You may feel weak and tired for a while, but your energy level will improve with time. · To prevent dehydration, drink plenty of fluids, enough so that your urine is light yellow or clear like water. Choose water and other caffeine-free clear liquids until you feel better. If you have kidney, heart, or liver disease and have to limit fluids, talk with your doctor before you increase the amount of fluids you drink. · Take care of your cough so you can rest. A cough that brings up mucus from your lungs is common with pneumonia. It is one way your body gets rid of the infection. But if coughing keeps you from resting or causes severe fatigue and chest-wall pain, talk to your doctor. He or she may suggest that you take a medicine to reduce the cough. · Use a vaporizer or humidifier to add moisture to your bedroom. Follow the directions for cleaning the machine. · Do not smoke or allow others to smoke around you. Smoke will make your cough last longer. If you need help quitting, talk to your doctor about stop-smoking programs and medicines. These can increase your chances of quitting for good. · Take an over-the-counter pain medicine, such as acetaminophen (Tylenol), ibuprofen (Advil, Motrin), or naproxen (Aleve). Read and follow all instructions on the label. · Do not take two or more pain medicines at the same time unless the doctor told you to. Many pain medicines have acetaminophen, which is Tylenol. Too much acetaminophen (Tylenol) can be harmful. · If you were given a spirometer to measure how well your lungs are working, use it as instructed. This can help your doctor tell how your recovery is going. · To prevent pneumonia in the future, talk to your doctor about getting a flu vaccine (once a year) and a pneumococcal vaccine (one time only for most people). When should you call for help?   Call 911 anytime you think you may need emergency care. For example, call if:    · You have severe trouble breathing.    Call your doctor now or seek immediate medical care if:    · You cough up dark brown or bloody mucus (sputum).     · You have new or worse trouble breathing.     · You are dizzy or lightheaded, or you feel like you may faint.    Watch closely for changes in your health, and be sure to contact your doctor if:    · You have a new or higher fever.     · You are coughing more deeply or more often.     · You are not getting better after 2 days (48 hours).     · You do not get better as expected. Where can you learn more? Go to http://jude-kae.info/. Enter [id number] in the search box to learn more about \"Pneumonia: Care Instructions. \"  Current as of: September 5, 2018  Content Version: 11.9  © 5313-7183 [employer]. Care instructions adapted under license by [employer] (which disclaims liability or warranty for this information). If you have questions about a medical condition or this instruction, always ask your healthcare professional. [address] any warranty or liability for your use of this information.

## 2019-06-25 ENCOUNTER — OFFICE VISIT (OUTPATIENT)
Dept: FAMILY MEDICINE CLINIC | Age: 44
End: 2019-06-25

## 2019-06-25 VITALS
WEIGHT: 216.6 LBS | HEART RATE: 85 BPM | BODY MASS INDEX: 28.71 KG/M2 | OXYGEN SATURATION: 97 % | RESPIRATION RATE: 22 BRPM | HEIGHT: 73 IN | TEMPERATURE: 98.4 F | DIASTOLIC BLOOD PRESSURE: 79 MMHG | SYSTOLIC BLOOD PRESSURE: 125 MMHG

## 2019-06-25 DIAGNOSIS — B35.1 ONYCHOMYCOSIS: ICD-10-CM

## 2019-06-25 DIAGNOSIS — J18.9 PNEUMONIA OF RIGHT UPPER LOBE DUE TO INFECTIOUS ORGANISM: Primary | ICD-10-CM

## 2019-06-25 DIAGNOSIS — L60.0 INGROWN NAIL OF GREAT TOE OF LEFT FOOT: ICD-10-CM

## 2019-06-25 NOTE — PROGRESS NOTES
Patient presents for follow-up pneumonia. He is still taking antibiotics and states that he is feeling better.

## 2019-06-25 NOTE — PROGRESS NOTES
06/25/19    PCP: Nhung Summers NP    Chief Complaint   Patient presents with    Follow-up    Pneumonia        HISTORY OF PRESENT ILLNESS  Christ Womack  is a 39 y.o. male whom presents for Follow-up and Pneumonia         Patient was seen in [facility] ED 6/19/19 for RUL PNA. He was treated with Doxycyline, He is currently still taking. He denies current fever. He does report some SOB and RUL chest pain with improvements. He continues to smoke, but reports he has cut back. Patient also with complaints of Ingrown toenail that is hurting and not better with foot soaks. Cough   The history is provided by the patient and medical records. This is a new problem. The problem has been gradually improving. Associated symptoms include chest pain (Right sided chest pain) and shortness of breath. Patient Active Problem List    Diagnosis Date Noted    Erectile dysfunction 01/15/2018    Cigarette smoker 01/15/2018    Refused influenza vaccine 10/10/2017    HTN, goal below 140/90 08/15/2017     Current Outpatient Medications   Medication Sig Dispense Refill    doxycycline (MONODOX) 100 mg capsule Take 1 Cap by mouth two (2) times a day for 10 days. 20 Cap 0    atorvastatin (LIPITOR) 20 mg tablet Take 1 Tab by mouth daily. 30 Tab 6    albuterol (PROVENTIL HFA, VENTOLIN HFA, PROAIR HFA) 90 mcg/actuation inhaler Take 2 Puffs by inhalation every four (4) hours as needed for Shortness of Breath. 1 Inhaler 6    fluticasone propion-salmeterol (ADVAIR/WIXELA) 100-50 mcg/dose diskus inhaler Take 1 Puff by inhalation every twelve (12) hours. 3 Inhaler 0    amLODIPine (NORVASC) 5 mg tablet Take 1 Tab by mouth daily. 30 Tab 6    linaclotide (LINZESS) 145 mcg cap capsule Take 1 Cap by mouth Daily (before breakfast). 90 Cap 3    tadalafil (CIALIS) 20 mg tablet Take 1 Tab by mouth as needed (ED).  Indications: Inability to have an Erection 30 Tab 3    fluticasone (FLONASE) 50 mcg/actuation nasal spray 2 Sprays by Both Nostrils route daily. 1 Bottle 0    METHADONE HCL (METHADONE PO) Take 85 mg by mouth daily.  nicotine (NICODERM CQ) 21 mg/24 hr 1 Patch by TransDERmal route every twenty-four (24) hours for 30 days. Indications: Stop Smoking 30 Patch 0     No Known Allergies  Past Medical History:   Diagnosis Date    ADD (attention deficit disorder)     Depression     Heroin abuse ([address]     Started age 14 years, on methadone clinic     Hypertension      Past Surgical History:   Procedure Laterality Date    HX HERNIA REPAIR      surgery as infant     Family History   Problem Relation Age of Onset    Depression Mother    [address] Migraines Mother     Diabetes Mother     Arthritis-osteo Mother     Alcohol abuse Father     Hypertension Father     High Cholesterol Father     Arthritis-osteo Father     Alzheimer Maternal Grandmother     Eczema Maternal Grandmother      Social History     Tobacco Use    Smoking status: Current Every Day Smoker     Packs/day: 0.50     Years: 25.00     Pack years: 12.50    Smokeless tobacco: Never Used   Substance Use Topics    Alcohol use: Yes     Alcohol/week: 0.6 oz     Types: 1 Cans of beer per week     Comment: rarely       Review of Systems   Constitutional: Negative. HENT: Negative. Eyes: Negative. Respiratory: Positive for cough and shortness of breath. Cardiovascular: Positive for chest pain (Right sided chest pain). Gastrointestinal: Negative. Genitourinary: Negative. Skin: Negative. Neurological: Negative. Visit Vitals  Ht 6' 1\" (1.854 m)   Wt 216 lb 9.6 oz (98.2 kg)   BMI 28.58 kg/m²       Pain Scale: 0 - No pain/10    Pain Location:      Physical Exam   Constitutional: He is oriented to person, place, and time and well-developed, well-nourished, and in no distress. HENT:   Head: Normocephalic. Eyes: Pupils are equal, round, and reactive to light. Neck: Normal range of motion. Neck supple.    Cardiovascular: Normal rate, regular rhythm and normal heart sounds. Pulmonary/Chest: Effort normal and breath sounds normal.   Abdominal: Soft. Bowel sounds are normal.   Musculoskeletal: Normal range of motion. He exhibits no edema. Neurological: He is alert and oriented to person, place, and time. Skin: Skin is warm and dry. Mycotic toenail of Left great toe, thickened with curve. No evidence of infection. Psychiatric: Mood and affect normal.   Hyperactive    Vitals reviewed. ASSESSMENT and PLAN    ICD-10-CM ICD-9-CM    1. Pneumonia of right upper lobe due to infectious organism ([address] J18.1 486    2. Onychomycosis B35.1 110.1 REFERRAL TO PODIATRY   3. Ingrown nail of great toe of left foot L60.0 703.0 REFERRAL TO PODIATRY     Diagnoses and all orders for this visit:    1. Pneumonia of right upper lobe due to infectious organism ([address]    2. Onychomycosis  -     REFERRAL TO PODIATRY    3. Ingrown nail of great toe of left foot  -     REFERRAL TO PODIATRY      Reviewed smoking cessation. Continue antibiotics in its entirety and use albuterol as needed. There are no discontinued medications. Written instructions followed our verbal discussion of all information discussed above, pending tests ordered and future goals/plans. Patient expressed understanding of current diagnosis, planned testing, follow up and if needed to contact the office for any questions or concerns prior to the next visit.

## 2019-06-25 NOTE — PATIENT INSTRUCTIONS
Pneumonia: Care Instructions  Your Care Instructions    Pneumonia is an infection of the lungs. Most cases are caused by infections from bacteria or viruses. Pneumonia may be mild or very severe. If it is caused by bacteria, you will be treated with antibiotics. It may take a few weeks to a few months to recover fully from pneumonia, depending on how sick you were and whether your overall health is good. Follow-up care is a key part of your treatment and safety. Be sure to make and go to all appointments, and call your doctor if you are having problems. It's also a good idea to know your test results and keep a list of the medicines you take. How can you care for yourself at home? · Take your antibiotics exactly as directed. Do not stop taking the medicine just because you are feeling better. You need to take the full course of antibiotics. · Take your medicines exactly as prescribed. Call your doctor if you think you are having a problem with your medicine. · Get plenty of rest and sleep. You may feel weak and tired for a while, but your energy level will improve with time. · To prevent dehydration, drink plenty of fluids, enough so that your urine is light yellow or clear like water. Choose water and other caffeine-free clear liquids until you feel better. If you have kidney, heart, or liver disease and have to limit fluids, talk with your doctor before you increase the amount of fluids you drink. · Take care of your cough so you can rest. A cough that brings up mucus from your lungs is common with pneumonia. It is one way your body gets rid of the infection. But if coughing keeps you from resting or causes severe fatigue and chest-wall pain, talk to your doctor. He or she may suggest that you take a medicine to reduce the cough. · Use a vaporizer or humidifier to add moisture to your bedroom. Follow the directions for cleaning the machine. · Do not smoke or allow others to smoke around you.  Smoke will make your cough last longer. If you need help quitting, talk to your doctor about stop-smoking programs and medicines. These can increase your chances of quitting for good. · Take an over-the-counter pain medicine, such as acetaminophen (Tylenol), ibuprofen (Advil, Motrin), or naproxen (Aleve). Read and follow all instructions on the label. · Do not take two or more pain medicines at the same time unless the doctor told you to. Many pain medicines have acetaminophen, which is Tylenol. Too much acetaminophen (Tylenol) can be harmful. · If you were given a spirometer to measure how well your lungs are working, use it as instructed. This can help your doctor tell how your recovery is going. · To prevent pneumonia in the future, talk to your doctor about getting a flu vaccine (once a year) and a pneumococcal vaccine (one time only for most people). When should you call for help? Call 911 anytime you think you may need emergency care. For example, call if:    · You have severe trouble breathing.    Call your doctor now or seek immediate medical care if:    · You cough up dark brown or bloody mucus (sputum).     · You have new or worse trouble breathing.     · You are dizzy or lightheaded, or you feel like you may faint.    Watch closely for changes in your health, and be sure to contact your doctor if:    · You have a new or higher fever.     · You are coughing more deeply or more often.     · You are not getting better after 2 days (48 hours).     · You do not get better as expected. Where can you learn more? Go to http://jude-kae.info/. Enter [id number] in the search box to learn more about \"Pneumonia: Care Instructions. \"  Current as of: September 5, 2018  Content Version: 11.9  © 8471-8066 [employer]. Care instructions adapted under license by [employer] (which disclaims liability or warranty for this information).  If you have questions about a medical condition or this instruction, always ask your healthcare professional. [place] [address] any warranty or liability for your use of this information.

## 2019-06-25 NOTE — LETTER
NOTIFICATION RETURN TO WORK / SCHOOL 
 
6/25/2019 11:09 AM 
 
Mr. Imtiaz Lentz [address] [facility]  [address] To Whom It May Concern: 
 
Imtiaz Lentz is currently under the care of [address] [place] [address]. He will return to work/school on: Wednesday 6/26/19 If there are questions or concerns please have the patient contact our office.  
 
 
 
Sincerely, 
 
 
Gabbi Joseph NP

## 2020-03-06 PROCEDURE — 99283 EMERGENCY DEPT VISIT LOW MDM: CPT

## 2020-03-07 ENCOUNTER — HOSPITAL ENCOUNTER (EMERGENCY)
Age: 45
Discharge: HOME | End: 2020-03-07
Attending: EMERGENCY MEDICINE
Payer: COMMERCIAL

## 2020-03-07 VITALS
SYSTOLIC BLOOD PRESSURE: 146 MMHG | BODY MASS INDEX: 30.09 KG/M2 | TEMPERATURE: 98.3 F | HEIGHT: 73 IN | OXYGEN SATURATION: 98 % | HEART RATE: 72 BPM | DIASTOLIC BLOOD PRESSURE: 98 MMHG | RESPIRATION RATE: 18 BRPM | WEIGHT: 227 LBS

## 2020-03-07 DIAGNOSIS — J01.00 ACUTE NON-RECURRENT MAXILLARY SINUSITIS: Primary | ICD-10-CM

## 2020-03-07 PROCEDURE — 74011250637 HC RX REV CODE- 250/637: Performed by: PHYSICIAN ASSISTANT

## 2020-03-07 RX ORDER — OXYMETAZOLINE HCL 0.05 %
2 SPRAY, NON-AEROSOL (ML) NASAL
Status: COMPLETED | OUTPATIENT
Start: 2020-03-07 | End: 2020-03-07

## 2020-03-07 RX ORDER — FLUTICASONE PROPIONATE 50 MCG
2 SPRAY, SUSPENSION (ML) NASAL DAILY
Qty: 16 G | Refills: 0 | Status: SHIPPED | OUTPATIENT
Start: 2020-03-07 | End: 2020-03-21

## 2020-03-07 RX ORDER — AMOXICILLIN AND CLAVULANATE POTASSIUM 875; 125 MG/1; MG/1
1 TABLET, FILM COATED ORAL 2 TIMES DAILY
Qty: 20 TAB | Refills: 0 | Status: SHIPPED | OUTPATIENT
Start: 2020-03-07 | End: 2020-03-17

## 2020-03-07 RX ORDER — AMLODIPINE BESYLATE 5 MG/1
5 TABLET ORAL DAILY
Qty: 20 TAB | Refills: 0 | Status: SHIPPED | OUTPATIENT
Start: 2020-03-07 | End: 2020-03-27

## 2020-03-07 RX ORDER — ALBUTEROL SULFATE 90 UG/1
2 AEROSOL, METERED RESPIRATORY (INHALATION)
Qty: 1 INHALER | Refills: 0 | Status: SHIPPED | OUTPATIENT
Start: 2020-03-07

## 2020-03-07 RX ORDER — NAPROXEN 250 MG/1
500 TABLET ORAL
Status: COMPLETED | OUTPATIENT
Start: 2020-03-07 | End: 2020-03-07

## 2020-03-07 RX ORDER — AMOXICILLIN AND CLAVULANATE POTASSIUM 875; 125 MG/1; MG/1
1 TABLET, FILM COATED ORAL
Status: COMPLETED | OUTPATIENT
Start: 2020-03-07 | End: 2020-03-07

## 2020-03-07 RX ADMIN — AMOXICILLIN AND CLAVULANATE POTASSIUM 1 TABLET: 875; 125 TABLET, FILM COATED ORAL at 02:05

## 2020-03-07 RX ADMIN — OXYMETAZOLINE HYDROCHLORIDE 2 SPRAY: 0.05 SPRAY NASAL at 02:01

## 2020-03-07 RX ADMIN — NAPROXEN 500 MG: 250 TABLET ORAL at 02:05

## 2020-03-07 NOTE — ED PROVIDER NOTES
[facility]  [facility] EMERGENCY DEPT    Patient Name: Rod Ochoa    History of Presenting Illness     Chief Complaint   Patient presents with    Sinus Pain       38 y.o. male with noted past medical history including HTN who presents to the emergency department c/o sinus pain for the past 1-2 weeks. Patient states he suffers from seasonal allergies, usually has flares during this time. States that over the past few days he has developed sweats while working, as well as severe constant facial pain/congestion. Denies fever, chills, cough, shortness of breath, other symptoms. Patient denies any other associated signs or symptoms. Patient denies any other complaints. Nursing notes regarding the HPI and triage nursing notes were reviewed. Prior medical records were reviewed. Current Facility-Administered Medications   Medication Dose Route Frequency Provider Last Rate Last Dose    amoxicillin-clavulanate (AUGMENTIN) 875-125 mg per tablet 1 Tab  1 Tab Oral NOW Loretta Funez PA        naproxen (NAPROSYN) tablet 500 mg  500 mg Oral NOW AMBER Lockhart         Current Outpatient Medications   Medication Sig Dispense Refill    amoxicillin-clavulanate (AUGMENTIN) 875-125 mg per tablet Take 1 Tab by mouth two (2) times a day for 10 days. 20 Tab 0    amLODIPine (NORVASC) 5 mg tablet Take 1 Tab by mouth daily for 20 days. 20 Tab 0    albuterol (PROVENTIL HFA, VENTOLIN HFA, PROAIR HFA) 90 mcg/actuation inhaler Take 2 Puffs by inhalation every four (4) hours as needed for Wheezing. 1 Inhaler 0    fluticasone propionate (FLONASE) 50 mcg/actuation nasal spray 2 Sprays by Both Nostrils route daily for 14 days. 16 g 0    tadalafil (CIALIS) 20 mg tablet Take 1 Tab by mouth as needed (ED). Indications: Inability to have an Erection 30 Tab 3    METHADONE HCL (METHADONE PO) Take 85 mg by mouth daily.          Past History     Past Medical History:  Past Medical History:   Diagnosis Date    ADD (attention deficit disorder)     Depression     [address]in [address] [address]     Started age 14 years, on methadone clinic     Hypertension        Past Surgical History:  Past Surgical History:   Procedure Laterality Date    HX HERNIA REPAIR      surgery as infant       Family History:  Family History   Problem Relation Age of Onset    Depression Mother    Jamilah Forman Migraines Mother     Diabetes Mother    Jamilah Forman Arthritis-osteo Mother     Alcohol abuse Father     Hypertension Father     High Cholesterol Father     Arthritis-osteo Father     Alzheimer Maternal Grandmother     Eczema Maternal Grandmother        Social History:  Social History     Tobacco Use    Smoking status: Current Every Day Smoker     Packs/day: 0.50     Years: 25.00     Pack years: 12.50    Smokeless tobacco: Never Used   Substance Use Topics    Alcohol use: Yes     Alcohol/week: 1.0 standard drinks     Types: 1 Cans of beer per week     Comment: rarely    Drug use: Yes     Types: Heroin       Allergies:  No Known Allergies    Patient's primary care provider (as noted in EPIC):  None    Review of Systems   Constitutional: + \"sweats. \" Denies fever, chills. Head:  Denies injury. Face:  Denies injury or pain. ENMT: + nasal congestion, facial pain. Denies sore throat. Respiratory:  Denies cough, wheezing, difficulty breathing, shortness of breath. GI/ABD:  Denies injury, pain, distention, nausea, vomiting, diarrhea. Neuro:  Denies headache, LOC, dizziness, neurologic symptoms/deficits/paresthesias. Skin: Denies injury, rash, itching or skin changes. All other systems negative as reviewed. Visit Vitals  BP (!) 146/98 (BP 1 Location: Left arm, BP Patient Position: At rest)   Pulse 72   Temp 98.3 °F (36.8 °C)   Resp 18   Ht 6' 1\" (1.854 m)   Wt 103 kg (227 lb)   SpO2 98%   BMI 29.95 kg/m²       PHYSICAL EXAM:    CONSTITUTIONAL:  Alert, in no apparent distress;  well developed;  well nourished. HEAD:  Normocephalic, atraumatic.   Sinuses: Reproducible tenderness palpation to bilateral maxillary sinuses, more so to the left. EYES:  EOMI. Non-icteric sclera. Normal conjunctiva. ENTM:  Nose: Nasal turbinates erythematous and edematous, moderate yellow rhinorrhea. Throat:  no erythema or exudate, mucous membranes moist.  Uvula midline, airway patent. NECK:  Supple  RESPIRATORY:  Chest clear, equal breath sounds, good air movement. Without wheezes, rhonchi, rales. CARDIOVASCULAR:  Regular rate and rhythm. No murmurs, rubs, or gallops. NEURO:  Moves all four extremities, and grossly normal motor exam.  SKIN:  No rashes;  Normal for age. PSYCH:  Alert and normal affect. ED COURSE:      Differential diagnosis: Sinusitis, URI, allergic rhinitis, other etiologies. Patient likely has sinusitis as he has had symptoms for about 2 weeks and is now worsening with tenderness to palpation to his maxillary sinuses, more so to the left. Will cover with Augmentin. Also provided with Rx for Flonase, given Naprosyn here. Patient is also requesting a refill of his home albuterol as well as his amlodipine. Used to be a patient of the [employer], I have referred him to the [facility]. He may return here for any worsening. Diagnosis:   1. Acute non-recurrent maxillary sinusitis      Disposition: Discharge    Follow-up Information     Follow up With Specialties Details [address]  In 3 days  [address]  [address]  [phone]    [facility] EMERGENCY DEPT Emergency Medicine  If symptoms worsen [address]  [phone]          Patient's Medications   Start Taking    ALBUTEROL (PROVENTIL HFA, VENTOLIN HFA, PROAIR HFA) 90 MCG/ACTUATION INHALER    Take 2 Puffs by inhalation every four (4) hours as needed for Wheezing. AMLODIPINE (NORVASC) 5 MG TABLET    Take 1 Tab by mouth daily for 20 days.     AMOXICILLIN-CLAVULANATE (AUGMENTIN) 875-125 MG PER TABLET    Take 1 Tab by mouth two (2) times a day for 10 days. FLUTICASONE PROPIONATE (FLONASE) 50 MCG/ACTUATION NASAL SPRAY    2 Sprays by Both Nostrils route daily for 14 days. Continue Taking    METHADONE HCL (METHADONE PO)    Take 85 mg by mouth daily. TADALAFIL (CIALIS) 20 MG TABLET    Take 1 Tab by mouth as needed (ED). Indications: Inability to have an Erection   These Medications have changed    No medications on file   Stop Taking    ALBUTEROL (PROVENTIL HFA, VENTOLIN HFA, PROAIR HFA) 90 MCG/ACTUATION INHALER    Take 2 Puffs by inhalation every four (4) hours as needed for Shortness of Breath. AMLODIPINE (NORVASC) 5 MG TABLET    Take 1 Tab by mouth daily. ATORVASTATIN (LIPITOR) 20 MG TABLET    Take 1 Tab by mouth daily. FLUTICASONE (FLONASE) 50 MCG/ACTUATION NASAL SPRAY    2 Sprays by Both Nostrils route daily. FLUTICASONE PROPION-SALMETEROL (ADVAIR/WIXELA) 100-50 MCG/DOSE DISKUS INHALER    Take 1 Puff by inhalation every twelve (12) hours. LINACLOTIDE (LINZESS) 145 MCG CAP CAPSULE    Take 1 Cap by mouth Daily (before breakfast).      AMBER Dominguez

## 2020-03-07 NOTE — DISCHARGE INSTRUCTIONS
Patient Education        Sinusitis: Care Instructions  Your Care Instructions    Sinusitis is an infection of the lining of the sinus cavities in your head. Sinusitis often follows a cold. It causes pain and pressure in your head and face. In most cases, sinusitis gets better on its own in 1 to 2 weeks. But some mild symptoms may last for several weeks. Sometimes antibiotics are needed. Follow-up care is a key part of your treatment and safety. Be sure to make and go to all appointments, and call your doctor if you are having problems. It's also a good idea to know your test results and keep a list of the medicines you take. How can you care for yourself at home? · Take an over-the-counter pain medicine, such as acetaminophen (Tylenol), ibuprofen (Advil, Motrin), or naproxen (Aleve). Read and follow all instructions on the label. · If the doctor prescribed antibiotics, take them as directed. Do not stop taking them just because you feel better. You need to take the full course of antibiotics. · Be careful when taking over-the-counter cold or flu medicines and Tylenol at the same time. Many of these medicines have acetaminophen, which is Tylenol. Read the labels to make sure that you are not taking more than the recommended dose. Too much acetaminophen (Tylenol) can be harmful. · Breathe warm, moist air from a steamy shower, a hot bath, or a sink filled with hot water. Avoid cold, dry air. Using a humidifier in your home may help. Follow the directions for cleaning the machine. · Use saline (saltwater) nasal washes to help keep your nasal passages open and wash out mucus and bacteria. You can buy saline nose drops at a grocery store or drugstore. Or you can make your own at home by adding 1 teaspoon of salt and 1 teaspoon of baking soda to 2 cups of distilled water. If you make your own, fill a bulb syringe with the solution, insert the tip into your nostril, and squeeze gently. Nyoka Mano your nose.   · Put a hot, wet towel or a warm gel pack on your face 3 or 4 times a day for 5 to 10 minutes each time. · Try a decongestant nasal spray like oxymetazoline (Afrin). Do not use it for more than 3 days in a row. Using it for more than 3 days can make your congestion worse. When should you call for help? Call your doctor now or seek immediate medical care if:    · You have new or worse swelling or redness in your face or around your eyes.     · You have a new or higher fever.    Watch closely for changes in your health, and be sure to contact your doctor if:    · You have new or worse facial pain.     · The mucus from your nose becomes thicker (like pus) or has new blood in it.     · You are not getting better as expected. Where can you learn more? Go to http://jude-kae.info/. Enter X852 in the search box to learn more about \"Sinusitis: Care Instructions. \"  Current as of: October 21, 2018  Content Version: 12.2  © 1621-8945 [employer]. Care instructions adapted under license by [employer] (which disclaims liability or warranty for this information). If you have questions about a medical condition or this instruction, always ask your healthcare professional. [address] any warranty or liability for your use of this information.

## 2020-09-08 ENCOUNTER — HOSPITAL ENCOUNTER (EMERGENCY)
Age: 45
Discharge: HOME | End: 2020-09-08
Attending: STUDENT IN AN ORGANIZED HEALTH CARE EDUCATION/TRAINING PROGRAM | Admitting: STUDENT IN AN ORGANIZED HEALTH CARE EDUCATION/TRAINING PROGRAM
Payer: COMMERCIAL

## 2020-09-08 VITALS
RESPIRATION RATE: 16 BRPM | OXYGEN SATURATION: 97 % | DIASTOLIC BLOOD PRESSURE: 89 MMHG | HEART RATE: 77 BPM | SYSTOLIC BLOOD PRESSURE: 136 MMHG | TEMPERATURE: 99.3 F

## 2020-09-08 DIAGNOSIS — K92.2 LOWER GI BLEED: Primary | ICD-10-CM

## 2020-09-08 LAB
ALBUMIN SERPL-MCNC: 3.8 G/DL (ref 3.4–5)
ALBUMIN/GLOB SERPL: 1 {RATIO} (ref 0.8–1.7)
ALP SERPL-CCNC: 92 U/L (ref 45–117)
ALT SERPL-CCNC: 20 U/L (ref 16–61)
ANION GAP SERPL CALC-SCNC: 6 MMOL/L (ref 3–18)
AST SERPL-CCNC: 20 U/L (ref 10–38)
BASOPHILS # BLD: 0 K/UL (ref 0–0.1)
BASOPHILS NFR BLD: 0 % (ref 0–2)
BILIRUB SERPL-MCNC: 0.8 MG/DL (ref 0.2–1)
BUN SERPL-MCNC: 5 MG/DL (ref 7–18)
BUN/CREAT SERPL: 5 (ref 12–20)
CALCIUM SERPL-MCNC: 9.3 MG/DL (ref 8.5–10.1)
CHLORIDE SERPL-SCNC: 101 MMOL/L (ref 100–111)
CO2 SERPL-SCNC: 31 MMOL/L (ref 21–32)
CREAT SERPL-MCNC: 0.98 MG/DL (ref 0.6–1.3)
DIFFERENTIAL METHOD BLD: ABNORMAL
EOSINOPHIL # BLD: 0.3 K/UL (ref 0–0.4)
EOSINOPHIL NFR BLD: 3 % (ref 0–5)
ERYTHROCYTE [DISTWIDTH] IN BLOOD BY AUTOMATED COUNT: 14.1 % (ref 11.6–14.5)
GLOBULIN SER CALC-MCNC: 3.7 G/DL (ref 2–4)
GLUCOSE SERPL-MCNC: 89 MG/DL (ref 74–99)
HCT VFR BLD AUTO: 39.2 % (ref 36–48)
HEMOCCULT STL QL: POSITIVE
HGB BLD-MCNC: 12.7 G/DL (ref 13–16)
LYMPHOCYTES # BLD: 1.7 K/UL (ref 0.9–3.6)
LYMPHOCYTES NFR BLD: 17 % (ref 21–52)
MCH RBC QN AUTO: 28.5 PG (ref 24–34)
MCHC RBC AUTO-ENTMCNC: 32.4 G/DL (ref 31–37)
MCV RBC AUTO: 88.1 FL (ref 74–97)
MONOCYTES # BLD: 0.7 K/UL (ref 0.05–1.2)
MONOCYTES NFR BLD: 7 % (ref 3–10)
NEUTS SEG # BLD: 7.2 K/UL (ref 1.8–8)
NEUTS SEG NFR BLD: 73 % (ref 40–73)
PLATELET # BLD AUTO: 412 K/UL (ref 135–420)
PMV BLD AUTO: 9.8 FL (ref 9.2–11.8)
POTASSIUM SERPL-SCNC: 3.8 MMOL/L (ref 3.5–5.5)
PROT SERPL-MCNC: 7.5 G/DL (ref 6.4–8.2)
RBC # BLD AUTO: 4.45 M/UL (ref 4.7–5.5)
SODIUM SERPL-SCNC: 138 MMOL/L (ref 136–145)
WBC # BLD AUTO: 9.9 K/UL (ref 4.6–13.2)

## 2020-09-08 PROCEDURE — 99282 EMERGENCY DEPT VISIT SF MDM: CPT

## 2020-09-08 PROCEDURE — 85025 COMPLETE CBC W/AUTO DIFF WBC: CPT

## 2020-09-08 PROCEDURE — 80053 COMPREHEN METABOLIC PANEL: CPT

## 2020-09-08 PROCEDURE — 82272 OCCULT BLD FECES 1-3 TESTS: CPT

## 2020-09-08 NOTE — ED PROVIDER NOTES
54-year-old male with past medical history significant for hypertension and heroin abuse on methadone presents to the ED with complaint of 2 days of blood in his stool and approximately 6 episodes of diarrhea yesterday. Prior to the diarrhea he said he's felt constipated with significant straining with bowel movements. The blood in his stool is bright red and this has never happened to him before. He denies any abdominal pain, nausea, vomiting, difficulty urinating, fevers, or chills. He endorses significant stress over the recent passing away of his father and recent hospitalization of his mother. He smokes cigarettes every day and also consumes approximately 3 alcoholic drinks per day. He claims he recently relapsed and did both heroin and cocaine as recently as yesterday. He denies any suicidal or homicidal ideation. No other complaints.            Past Medical History:   Diagnosis Date    ADD (attention deficit disorder)     Depression     Heroin abuse ([facility] [address]     Started age 14 years, on methadone clinic     Hypertension        Past Surgical History:   Procedure Laterality Date    HX HERNIA REPAIR      surgery as infant         Family History:   Problem Relation Age of Onset    Depression Mother     Migraines Mother     Diabetes Mother     Arthritis-osteo Mother     Alcohol abuse Father     Hypertension Father     High Cholesterol Father     Arthritis-osteo Father     Alzheimer Maternal Grandmother     Eczema Maternal Grandmother        Social History     Socioeconomic History    Marital status: SINGLE     Spouse name: Not on file    Number of children: Not on file    Years of education: GED    Highest education level: Not on file   Occupational History    Not on file   Social Needs    Financial resource strain: Not on file    Food insecurity     Worry: Not on file     Inability: Not on file    Transportation needs     Medical: Not on file     Non-medical: Not on file   Tobacco Use  Smoking status: Current Every Day Smoker     Packs/day: 0.50     Years: 25.00     Pack years: 12.50    Smokeless tobacco: Never Used   Substance and Sexual Activity    Alcohol use: Yes     Alcohol/week: 1.0 standard drinks     Types: 1 Cans of beer per week     Comment: rarely    Drug use: Yes     Types: Heroin    Sexual activity: Yes     Partners: Female     Birth control/protection: None   Lifestyle    Physical activity     Days per week: Not on file     Minutes per session: Not on file    Stress: Not on file   Relationships    Social connections     Talks on phone: Not on file     Gets together: Not on file     Attends Shinto service: Not on file     Active member of club or organization: Not on file     Attends meetings of clubs or organizations: Not on file     Relationship status: Not on file    Intimate partner violence     Fear of current or ex partner: Not on file     Emotionally abused: Not on file     Physically abused: Not on file     Forced sexual activity: Not on file   Other Topics Concern     Service Not Asked    Blood Transfusions Not Asked    Caffeine Concern Not Asked    Occupational Exposure Not Asked   [employer] Hazards Not Asked    Sleep Concern Not Asked    Stress Concern Not Asked    Weight Concern Not Asked    Special Diet Not Asked    Back Care Not Asked    Exercise No    Bike Helmet Not Asked    Seat Belt No    Self-Exams Not Asked   Social History Narrative    Not on file         ALLERGIES: Patient has no known allergies. Review of Systems   Constitutional: Negative for activity change and appetite change. HENT: Negative for drooling and facial swelling. Eyes: Negative for pain and discharge. Respiratory: Negative for apnea and choking. Cardiovascular: Negative for palpitations and leg swelling. Gastrointestinal: Positive for blood in stool. Negative for rectal pain. Endocrine: Negative for polydipsia and polyphagia.    Genitourinary: Negative for genital sores and hematuria. Musculoskeletal: Negative for gait problem and neck stiffness. Skin: Negative for color change and rash. Allergic/Immunologic: Negative for environmental allergies. Neurological: Negative for tremors. Hematological: Negative for adenopathy. Psychiatric/Behavioral: Negative for agitation and behavioral problems. Vitals:    09/08/20 1632   BP: 136/89   Pulse: 77   Resp: 16   Temp: 99.3 °F (37.4 °C)   SpO2: 97%            Physical Exam  Vitals signs and nursing note reviewed. Constitutional:       Appearance: Normal appearance. HENT:      Head: Normocephalic and atraumatic. Eyes:      Extraocular Movements: Extraocular movements intact. Pupils: Pupils are equal, round, and reactive to light. Cardiovascular:      Rate and Rhythm: Normal rate and regular rhythm. Heart sounds: Normal heart sounds. No murmur. No friction rub. Pulmonary:      Effort: Pulmonary effort is normal.      Breath sounds: Normal breath sounds. Abdominal:      Palpations: Abdomen is soft. Genitourinary:     Comments: Rectal exam without obvious hemorrhoids, non-tender, no blood. Musculoskeletal: Normal range of motion. Skin:     General: Skin is warm and dry. Neurological:      General: No focal deficit present. Mental Status: He is alert. Psychiatric:         Mood and Affect: Mood normal.          MDM  Number of Diagnoses or Management Options  Diagnosis management comments: 77-year-old male with recent constipation and now 2 days of bright red blood per rectum. No obvious hemorrhoids seen on physical exam.  Will perform basic laboratory work-up and send a stool guaiac test.  Patient is completely hemodynamically stable and not tachycardic or hypotensive. Will reassess. Work-up significant for anemia with a hemoglobin of 12.7 and positive stool guaiac.   Given the lack of any obvious hemorrhoids on physical exam these results are concerning for lower GI bleed. His Terrell score is 13 which means the probability of safe discharge (absence of rebleeding, blood transfusion, therapeutic intervention, 28 day readmission, or death) is only 87-89%. Discharge NOT recommended. GI paged. Patient is refusing to stay. I repeatedly explained the importance of inpatient hospitalization so that he can undergo evaluation by GI and possible colonoscopy for a lower GI bleed. He states that he needs to go home to take care of his mother and that he will return to the ED tomorrow morning. Patient is A&Ox3, demonstrates understanding, has full mental capacity and continues to refuse hospitalization. SB Alas present with me during this discussion and was witness to him signing the [place] form. Patient's vital signs are normal.     Patient was discharged from the ED against medical advice. I strongly recommended that they follow up with their primary doctor as soon as possible.            Procedures

## 2020-09-08 NOTE — LETTER
[facility] EMERGENCY DEPT 
[address] [id number] [phone] Work/School Note Date: 9/8/2020 To Whom It May concern: 
 
Arabella Shaw was seen and treated today in the emergency room by the following provider(s): 
Attending Provider: Jaylon Summers DO.   
 
Arabella Shaw is excused from work/school on 09/08/20 and 09/09/20. He is medically clear to return to work/school on 9/10/2020.   
 
 
Sincerely, 
 
 
 
 
Anum Rios DO

## 2020-09-09 ENCOUNTER — HOSPITAL ENCOUNTER (EMERGENCY)
Age: 45
Discharge: HOME | End: 2020-09-09
Attending: EMERGENCY MEDICINE
Payer: COMMERCIAL

## 2020-09-09 VITALS
RESPIRATION RATE: 16 BRPM | DIASTOLIC BLOOD PRESSURE: 81 MMHG | OXYGEN SATURATION: 100 % | SYSTOLIC BLOOD PRESSURE: 114 MMHG | HEART RATE: 62 BPM | TEMPERATURE: 99.3 F

## 2020-09-09 DIAGNOSIS — K92.1 HEMATOCHEZIA: Primary | ICD-10-CM

## 2020-09-09 LAB
ANION GAP SERPL CALC-SCNC: 2 MMOL/L (ref 3–18)
BASOPHILS # BLD: 0 K/UL (ref 0–0.1)
BASOPHILS NFR BLD: 0 % (ref 0–2)
BUN SERPL-MCNC: 12 MG/DL (ref 7–18)
BUN/CREAT SERPL: 14 (ref 12–20)
CALCIUM SERPL-MCNC: 9.1 MG/DL (ref 8.5–10.1)
CHLORIDE SERPL-SCNC: 103 MMOL/L (ref 100–111)
CO2 SERPL-SCNC: 31 MMOL/L (ref 21–32)
CREAT SERPL-MCNC: 0.88 MG/DL (ref 0.6–1.3)
DIFFERENTIAL METHOD BLD: ABNORMAL
EOSINOPHIL # BLD: 0.4 K/UL (ref 0–0.4)
EOSINOPHIL NFR BLD: 4 % (ref 0–5)
ERYTHROCYTE [DISTWIDTH] IN BLOOD BY AUTOMATED COUNT: 14.1 % (ref 11.6–14.5)
GLUCOSE SERPL-MCNC: 98 MG/DL (ref 74–99)
HCT VFR BLD AUTO: 34 % (ref 36–48)
HGB BLD-MCNC: 11.2 G/DL (ref 13–16)
LYMPHOCYTES # BLD: 1.8 K/UL (ref 0.9–3.6)
LYMPHOCYTES NFR BLD: 19 % (ref 21–52)
MCH RBC QN AUTO: 28.6 PG (ref 24–34)
MCHC RBC AUTO-ENTMCNC: 32.9 G/DL (ref 31–37)
MCV RBC AUTO: 86.7 FL (ref 74–97)
MONOCYTES # BLD: 0.7 K/UL (ref 0.05–1.2)
MONOCYTES NFR BLD: 7 % (ref 3–10)
NEUTS SEG # BLD: 6.8 K/UL (ref 1.8–8)
NEUTS SEG NFR BLD: 70 % (ref 40–73)
PLATELET # BLD AUTO: 432 K/UL (ref 135–420)
PMV BLD AUTO: 9.2 FL (ref 9.2–11.8)
POTASSIUM SERPL-SCNC: 3.9 MMOL/L (ref 3.5–5.5)
RBC # BLD AUTO: 3.92 M/UL (ref 4.7–5.5)
SODIUM SERPL-SCNC: 136 MMOL/L (ref 136–145)
WBC # BLD AUTO: 9.7 K/UL (ref 4.6–13.2)

## 2020-09-09 PROCEDURE — 99284 EMERGENCY DEPT VISIT MOD MDM: CPT

## 2020-09-09 PROCEDURE — 80048 BASIC METABOLIC PNL TOTAL CA: CPT

## 2020-09-09 PROCEDURE — 85025 COMPLETE CBC W/AUTO DIFF WBC: CPT

## 2020-09-09 NOTE — LETTER
Connie Angeles was seen and treated in our emergency department on 9/9/2020. He has a medical appointment tomorrow and will require the time off tomorrow 9/10/2020. He may return based on the results of the appointment at their discretion. If you have any questions or concerns, please don't hesitate to call.  
 
Bobbi Valle DO

## 2020-09-09 NOTE — ED PROVIDER NOTES
Patient is a 48-year male presenting with bright red rectal bleeding. Patient was seen yesterday for the same issue, he reports that this started about 4 days prior to this with bright red blood, noted in the stool and in the toilet. He was seen yesterday and had labs done as well as a rectal exam performed and he states he had to leave prior to completing the work-up. He returns today to complete his work-up. He reports a rectal bleeding seems to have subsided to some degree. He denies any fevers or chills, no abdominal pain but does have some intermittent cramping in the abdomen. Reports. Of constipation leading up to this that required a lot of straining however he does have any pain with bowel movements at this time. He is not any blood thinners. Denies any chest pain, shortness breath, passing out.            Past Medical History:   Diagnosis Date    ADD (attention deficit disorder)     Depression     Heroin abuse ([facility] [address]     Started age 14 years, on methadone clinic     Hypertension        Past Surgical History:   Procedure Laterality Date    HX HERNIA REPAIR      surgery as infant         Family History:   Problem Relation Age of Onset    Depression Mother     Migraines Mother     Diabetes Mother     Arthritis-osteo Mother     Alcohol abuse Father     Hypertension Father     High Cholesterol Father     Arthritis-osteo Father     Alzheimer Maternal Grandmother     Eczema Maternal Grandmother        Social History     Socioeconomic History    Marital status: SINGLE     Spouse name: Not on file    Number of children: Not on file    Years of education: GED    Highest education level: Not on file   Occupational History    Not on file   Social Needs    Financial resource strain: Not on file    Food insecurity     Worry: Not on file     Inability: Not on file    Transportation needs     Medical: Not on file     Non-medical: Not on file   Tobacco Use    Smoking status: Current Every Day Smoker     Packs/day: 0.50     Years: 25.00     Pack years: 12.50    Smokeless tobacco: Never Used   Substance and Sexual Activity    Alcohol use: Yes     Alcohol/week: 1.0 standard drinks     Types: 1 Cans of beer per week     Comment: rarely    Drug use: Yes     Types: Heroin    Sexual activity: Yes     Partners: Female     Birth control/protection: None   Lifestyle    Physical activity     Days per week: Not on file     Minutes per session: Not on file    Stress: Not on file   Relationships    Social connections     Talks on phone: Not on file     Gets together: Not on file     Attends Caodaism service: Not on file     Active member of club or organization: Not on file     Attends meetings of clubs or organizations: Not on file     Relationship status: Not on file    Intimate partner violence     Fear of current or ex partner: Not on file     Emotionally abused: Not on file     Physically abused: Not on file     Forced sexual activity: Not on file   Other Topics Concern     Service Not Asked    Blood Transfusions Not Asked    Caffeine Concern Not Asked    Occupational Exposure Not Asked   [employer] Hazards Not Asked    Sleep Concern Not Asked    Stress Concern Not Asked    Weight Concern Not Asked    Special Diet Not Asked    Back Care Not Asked    Exercise No    Bike Helmet Not Asked    Seat Belt No    Self-Exams Not Asked   Social History Narrative    Not on file         ALLERGIES: Patient has no known allergies. Review of Systems   Constitutional: Negative for chills and fever. HENT: Negative for congestion, rhinorrhea, sinus pressure and sneezing. Eyes: Negative for visual disturbance. Respiratory: Negative for cough and shortness of breath. Cardiovascular: Negative for chest pain. Gastrointestinal: Positive for blood in stool and constipation. Negative for abdominal pain, diarrhea, nausea and vomiting. Genitourinary: Negative for dysuria, frequency and urgency. Musculoskeletal: Negative for back pain and neck pain. Skin: Negative for rash. Neurological: Negative for syncope, numbness and headaches. Vitals:    09/09/20 1150   BP: 135/82   Pulse: 83   Resp: 16   Temp: 99.3 °F (37.4 °C)   SpO2: 98%            Physical Exam  Constitutional:       General: He is not in acute distress. Appearance: Normal appearance. He is normal weight. He is not ill-appearing or toxic-appearing. HENT:      Head: Normocephalic and atraumatic. Right Ear: External ear normal.      Left Ear: External ear normal.      Nose: Nose normal. No congestion or rhinorrhea. Mouth/Throat:      Mouth: Mucous membranes are moist.      Pharynx: Oropharynx is clear. No oropharyngeal exudate or posterior oropharyngeal erythema. Eyes:      Extraocular Movements: Extraocular movements intact. Conjunctiva/sclera: Conjunctivae normal.      Pupils: Pupils are equal, round, and reactive to light. Neck:      Musculoskeletal: Normal range of motion and neck supple. No muscular tenderness. Cardiovascular:      Rate and Rhythm: Normal rate and regular rhythm. Pulses: Normal pulses. Heart sounds: Normal heart sounds. No murmur. Pulmonary:      Effort: Pulmonary effort is normal.      Breath sounds: Normal breath sounds. No wheezing, rhonchi or rales. Abdominal:      General: Abdomen is flat. Tenderness: There is no abdominal tenderness. There is no guarding or rebound. Genitourinary:     Comments: Deferred as patient had one yesterday  Musculoskeletal: Normal range of motion. General: No swelling, tenderness or deformity. Skin:     General: Skin is warm and dry. Capillary Refill: Capillary refill takes less than 2 seconds. Findings: No rash. Neurological:      General: No focal deficit present. Mental Status: He is alert.         Recent Results (from the past 12 hour(s))   CBC WITH AUTOMATED DIFF    Collection Time: 09/09/20 12:47 PM Result Value Ref Range    WBC 9.7 4.6 - 13.2 K/uL    RBC 3.92 (L) 4.70 - 5.50 M/uL    HGB 11.2 (L) 13.0 - 16.0 g/dL    HCT 34.0 (L) 36.0 - 48.0 %    MCV 86.7 74.0 - 97.0 FL    MCH 28.6 24.0 - 34.0 PG    MCHC 32.9 31.0 - 37.0 g/dL    RDW 14.1 11.6 - 14.5 %    PLATELET 785 (H) 824 - 420 K/uL    MPV 9.2 9.2 - 11.8 FL    NEUTROPHILS 70 40 - 73 %    LYMPHOCYTES 19 (L) 21 - 52 %    MONOCYTES 7 3 - 10 %    EOSINOPHILS 4 0 - 5 %    BASOPHILS 0 0 - 2 %    ABS. NEUTROPHILS 6.8 1.8 - 8.0 K/UL    ABS. LYMPHOCYTES 1.8 0.9 - 3.6 K/UL    ABS. MONOCYTES 0.7 0.05 - 1.2 K/UL    ABS. EOSINOPHILS 0.4 0.0 - 0.4 K/UL    ABS. BASOPHILS 0.0 0.0 - 0.1 K/UL    DF AUTOMATED     METABOLIC PANEL, BASIC    Collection Time: 09/09/20 12:47 PM   Result Value Ref Range    Sodium 136 136 - 145 mmol/L    Potassium 3.9 3.5 - 5.5 mmol/L    Chloride 103 100 - 111 mmol/L    CO2 31 21 - 32 mmol/L    Anion gap 2 (L) 3.0 - 18 mmol/L    Glucose 98 74 - 99 mg/dL    BUN 12 7.0 - 18 MG/DL    Creatinine 0.88 0.6 - 1.3 MG/DL    BUN/Creatinine ratio 14 12 - 20      GFR est AA >60 >60 ml/min/1.73m2    GFR est non-AA >60 >60 ml/min/1.73m2    Calcium 9.1 8.5 - 10.1 MG/DL       MDM  Number of Diagnoses or Management Options  Hematochezia:   Diagnosis management comments: 67-year-old male presenting with hematochezia. He has had the symptoms present for about 4 days. He was seen yesterday for the same issue and had to leave prior to completing his work-up. Reviewed his labs from previous visit as well as medical records and nursing notes. Patient has no abdominal tenderness, do not suspect diverticulitis, bowel obstruction or acute intra-abdominal process. His repeat labs today demonstrate a hemoglobin that is decreased at 11.2 from his recent of 12.7. Differential includes diverticulosis, colon cancer, hematochezia, upper GI bleeding, lower GI bleeding, hemorrhagic shock, etc.      He has been hemodynamically stable.   Was noted to have decreased blood pressures on 2 consecutive readings in the department at that point I reevaluated the patient, his heart rate is in the 50s, however he is not on any beta-blockers, appears to be in no distress and has no complaints I readjusted the cuff as it appeared to be on his elbow approaching his forearm and then repeated his blood pressure and it was normal afterwards. Case was discussed with gastroenterology who is able to see the patient in the office tomorrow. Patient would prefer discharge. Advised him that he would likely need a colonoscopy. They have already contacted the patient today to arrange follow-up with him tomorrow. I think this is reasonable as he reports the bleeding has been slowing down and he has had only one episode today that was minimally bloody. He has had no recurrent episodes of bleeding in the department. Advised to return at once if the bleeding returns or becomes heavy, if he becomes lightheaded, passes out develop chest pain, abdominal pain, shortness of breath or any other symptoms that concern him. He was in agreement with the plan, expressed understanding and all questions were answered. Patient is stable for discharge home. ED Course as of Sep 09 2017   Wed Sep 09, 2020   1511 Case discussed with gastroenterology, they are going to try to arrange an outpatient scope for him if possible, if they are unable to arrange the next couple days will likely require admission. [ELANA]   1811 Case discussed with GI again, they will see the patient in the office tomorrow. Noted patient to have lower blood pressures on the monitor. When I assessed the patient he reports he feels fine no lightheadedness. Blood pressure cuff just distal to the elbow, I placed it on the proper positioning and blood pressure recheck was normal.    [ELANA]      ED Course User Index  [ELANA] Will Mojica,        Procedures    Diagnosis:   1.  Hematochezia          Disposition: Discharge    Follow-up Information Follow up With Specialties Details Why Marybeth Childers MD Gastroenterology   [address] [facility]  [address]  [address] [facility]      Nick Harrison MD Gastroenterology  You should expect a call today to tell you when and where to go for your appointment. If you do not hear back from them you should call the office to arrange a follow-up appointment. [address]      [facility] EMERGENCY DEPT Emergency Medicine  As needed, If symptoms worsen [address]  [phone]          Discharge Medication List as of 9/9/2020  3:44 PM      CONTINUE these medications which have NOT CHANGED    Details   albuterol (PROVENTIL HFA, VENTOLIN HFA, PROAIR HFA) 90 mcg/actuation inhaler Take 2 Puffs by inhalation every four (4) hours as needed for Wheezing., Normal, Disp-1 Inhaler, R-0      tadalafil (CIALIS) 20 mg tablet Take 1 Tab by mouth as needed (ED). Indications: Inability to have an Erection, Normal, Disp-30 Tab, R-3      METHADONE HCL (METHADONE PO) Take 85 mg by mouth daily. , Historical Med

## 2020-09-09 NOTE — ED NOTES
RN to bedside for BP. Pt asleep. Easily arousable. Alert, oriented, denies dizziness or CP. Provided blankets. Denies additional needs at this time.

## 2020-09-09 NOTE — DISCHARGE INSTRUCTIONS
Patient Education        Lower Gastrointestinal Bleeding: Care Instructions  Your Care Instructions     The digestive or gastrointestinal tract goes from the mouth to the anus. It is often called the GI tract. Bleeding in the lower GI tract can happen anywhere in your small or large intestine. It can also happen in your rectum or anus. In some cases, it is caused by an infection, cancer, or inflammatory bowel disease. Or it may be caused by hemorrhoids, diverticulitis, or clotting problems. Light bleeding may not cause any symptoms at first. But if you continue to bleed for a while, you may feel very weak or tired. Sudden, heavy bleeding means you need to see a doctor right away. This kind of bleeding can be very dangerous. But it can usually be cured or controlled. The doctor may do some tests to find the cause of your bleeding. Follow-up care is a key part of your treatment and safety. Be sure to make and go to all appointments, and call your doctor if you are having problems. It's also a good idea to know your test results and keep a list of the medicines you take. How can you care for yourself at home? · Be safe with medicines. Take your medicines exactly as prescribed. Call your doctor if you think you are having a problem with your medicine. You will get more details on the specific medicines your doctor prescribes. · Do not take aspirin or other anti-inflammatory medicines, such as naproxen (Aleve) or ibuprofen (Advil, Motrin), without talking to your doctor first. Ask your doctor if it is okay to use acetaminophen (Tylenol). · Do not drink alcohol. · The bleeding may make you lose iron. So it's important to eat foods that have a lot of iron. These include red meat, shellfish, poultry, and eggs. They also include beans, raisins, whole-grain breads, and leafy green vegetables. If you want help planning meals, you can meet with a dietitian. When should you call for help?    Call 911 anytime you think you may need emergency care. For example, call if:    · You have sudden, severe belly pain.     · You vomit blood or what looks like coffee grounds.     · You passed out (lost consciousness).     · Your stools are maroon or very bloody. Call your doctor now or seek immediate medical care if:    · You are dizzy or lightheaded, or you feel like you may faint.     · Your stools are black and look like tar, or they have streaks of blood.     · You have belly pain.     · You vomit or have nausea. Watch closely for changes in your health, and be sure to contact your doctor if you do not get better as expected. Where can you learn more? Go to http://jude-kae.info/  Enter M732697 in the search box to learn more about \"Lower Gastrointestinal Bleeding: Care Instructions. \"  Current as of: June 26, 2019               Content Version: 12.6  © 6192-6916 [employer]. Care instructions adapted under license by [employer] (which disclaims liability or warranty for this information). If you have questions about a medical condition or this instruction, always ask your healthcare professional. [place] [address] any warranty or liability for your use of this information. 1.  Follow-up with the gastroenterologist tomorrow. Your appointment is at 1250. Call [phone] if you have any questions, you should anticipate a phone call from them to tell you where exactly what to expect for the appointment. 2.  If you feel worse in the interim such as feeling lightheaded as if you are going to pass out or if you pass out or if he develop chest pain, shortness of breath, heavy bleeding or any other symptoms that concern you, you should return immediately.

## 2020-09-09 NOTE — ED TRIAGE NOTES
Pt reports that he was seen here yesterday for rectal bleeding had to take care of his mother and states he had to leave. Pt reports that his hemoglobin was 12 and states was told by Ed provider to come back and be seen since he had to leave yesterday.  Pt reports decrease in rectal bleeding

## 2020-09-10 ENCOUNTER — ANESTHESIA EVENT (OUTPATIENT)
Dept: ENDOSCOPY | Age: 45
End: 2020-09-10
Payer: MEDICAID

## 2020-09-10 RX ORDER — AMLODIPINE BESYLATE 5 MG/1
5 TABLET ORAL DAILY
COMMUNITY

## 2020-09-11 ENCOUNTER — HOSPITAL ENCOUNTER (OUTPATIENT)
Age: 45
Setting detail: OUTPATIENT SURGERY
Discharge: HOME | End: 2020-09-11
Attending: INTERNAL MEDICINE | Admitting: INTERNAL MEDICINE
Payer: MEDICAID

## 2020-09-11 ENCOUNTER — ANESTHESIA (OUTPATIENT)
Dept: ENDOSCOPY | Age: 45
End: 2020-09-11
Payer: MEDICAID

## 2020-09-11 LAB
AMPHET UR QL SCN: NEGATIVE
BARBITURATES UR QL SCN: NEGATIVE
BENZODIAZ UR QL: POSITIVE
CANNABINOIDS UR QL SCN: NEGATIVE
COCAINE UR QL SCN: POSITIVE
COVID-19 RAPID TEST, COVR: NOT DETECTED
HDSCOM,HDSCOM: ABNORMAL
METHADONE UR QL: POSITIVE
OPIATES UR QL: POSITIVE
PCP UR QL: NEGATIVE
SOURCE, COVRS: NORMAL
SPECIMEN TYPE, XMCV1T: NORMAL

## 2020-09-11 PROCEDURE — 87635 SARS-COV-2 COVID-19 AMP PRB: CPT

## 2020-09-11 PROCEDURE — 80307 DRUG TEST PRSMV CHEM ANLYZR: CPT

## 2020-09-11 RX ORDER — SODIUM CHLORIDE 0.9 % (FLUSH) 0.9 %
5-40 SYRINGE (ML) INJECTION AS NEEDED
Status: DISCONTINUED | OUTPATIENT
Start: 2020-09-11 | End: 2020-09-11 | Stop reason: HOSPADM

## 2020-09-11 RX ORDER — SODIUM CHLORIDE 0.9 % (FLUSH) 0.9 %
5-40 SYRINGE (ML) INJECTION EVERY 8 HOURS
Status: DISCONTINUED | OUTPATIENT
Start: 2020-09-11 | End: 2020-09-11 | Stop reason: HOSPADM

## 2020-09-11 RX ORDER — SODIUM CHLORIDE, SODIUM LACTATE, POTASSIUM CHLORIDE, CALCIUM CHLORIDE 600; 310; 30; 20 MG/100ML; MG/100ML; MG/100ML; MG/100ML
75 INJECTION, SOLUTION INTRAVENOUS CONTINUOUS
Status: DISCONTINUED | OUTPATIENT
Start: 2020-09-11 | End: 2020-09-11 | Stop reason: HOSPADM

## 2020-09-11 NOTE — DISCHARGE INSTRUCTIONS
Call Dr. Keyur Loomis office to reschedule. DISCHARGE SUMMARY from Nurse    PATIENT INSTRUCTIONS:    After general anesthesia or intravenous sedation, for 24 hours or while taking prescription Narcotics:  · Limit your activities  · Do not drive and operate hazardous machinery  · Do not make important personal or business decisions  · Do  not drink alcoholic beverages  · If you have not urinated within 8 hours after discharge, please contact your surgeon on call. Report the following to your surgeon:  · Excessive pain, swelling, redness or odor of or around the surgical area  · Temperature over 100.5  · Nausea and vomiting lasting longer than 4 hours or if unable to take medications  · Any signs of decreased circulation or nerve impairment to extremity: change in color, persistent  numbness, tingling, coldness or increase pain  · Any questions    What to do at Home:  Recommended activity: Activity as tolerated      *  Please give a list of your current medications to your Primary Care Provider. *  Please update this list whenever your medications are discontinued, doses are      changed, or new medications (including over-the-counter products) are added. *  Please carry medication information at all times in case of emergency situations. These are general instructions for a healthy lifestyle:    No smoking/ No tobacco products/ Avoid exposure to second hand smoke  Surgeon General's Warning:  Quitting smoking now greatly reduces serious risk to your health.     Obesity, smoking, and sedentary lifestyle greatly increases your risk for illness    A healthy diet, regular physical exercise & weight monitoring are important for maintaining a healthy lifestyle    You may be retaining fluid if you have a history of heart failure or if you experience any of the following symptoms:  Weight gain of 3 pounds or more overnight or 5 pounds in a week, increased swelling in our hands or feet or shortness of breath while lying flat in bed. Please call your doctor as soon as you notice any of these symptoms; do not wait until your next office visit. The discharge information has been reviewed with the patient. The patient verbalized understanding. Discharge medications reviewed with the patient and appropriate educational materials and side effects teaching were provided. MyChart Activation    Thank you for requesting access to [facility]. Please follow the instructions below to securely access and download your online medical record. [facility] allows you to send messages to your doctor, view your test results, renew your prescriptions, schedule appointments, and more. How Do I Sign Up? 1. In your internet browser, go to [email or web address] [email or web address]  2. Click on the First Time User? Click Here link in the Sign In box. You will be redirect to the New Member Sign Up page. 3. Enter your [facility] Access Code exactly as it appears below. You will not need to use this code after youve completed the sign-up process. If you do not sign up before the expiration date, you must request a new code. [facility] Access Code: [email or web address]  Expires: 10/23/2020  4:31 PM (This is the date your [facility] access code will )    4. Enter the last four digits of your Social Security Number (xxxx) and Date of Birth (mm/dd/yyyy) as indicated and click Submit. You will be taken to the next sign-up page. 5. Create a [facility] ID. This will be your [facility] login ID and cannot be changed, so think of one that is secure and easy to remember. 6. Create a [facility] password. You can change your password at any time. 7. Enter your Password Reset Question and Answer. This can be used at a later time if you forget your password. 8. Enter your e-mail address. You will receive e-mail notification when new information is available in [address] 9. Click Sign Up. You can now view and download portions of your medical record.   10. Click the Download Summary menu link to download a portable copy of your medical information. Additional Information    If you have questions, please visit the Frequently Asked Questions section of the [employer] website at [email or web address] Remember, [employer] is NOT to be used for urgent needs. For medical emergencies, dial 911.     Patient armband removed and shredded    ___________________________________________________________________________________________________________________________________

## 2020-09-11 NOTE — ANESTHESIA PREPROCEDURE EVALUATION
Relevant Problems   No relevant active problems       Anesthetic History   No history of anesthetic complications       Comments: No prior GA  Neg family hx     Review of Systems / Medical History  Patient summary reviewed and pertinent labs reviewed    Pulmonary          Smoker         Neuro/Psych         Psychiatric history    Comments: EtOH  IVDA Heroin  methadone Cardiovascular    Hypertension: well controlled                Comments: UDS if positive for Cocaine case will be cancelled   GI/Hepatic/Renal               Comments: EtOH  Heroin Endo/Other             Other Findings              Physical Exam    Airway  Mallampati: I  TM Distance: > 6 cm  Neck ROM: normal range of motion   Mouth opening: Normal     Cardiovascular               Dental  No notable dental hx       Pulmonary                Comments: Non Labored Abdominal  GI exam deferred       Other Findings            Anesthetic Plan    ASA: 3  Anesthesia type: MAC            Anesthetic plan and risks discussed with: Patient

## 2021-01-01 NOTE — PROGRESS NOTES
Called Pt to give PFT results.   Left phone message to call the office
Please let the patient know that his PFT did not show any obstruction or restrictive airway disease. It did show reduction in his diffusion capacity, which is the lungs ability to absorb oxygen into the body. This could be a sign of early stage COPD. Continuing to smoke will only cause further progression of disease.
Pt came by the office to get PFT results. Pt understand that he will need to quit smoking because he does have reduction in his diffusion capacity which is the lungs ability to absorb oxygen into the body which could also be a sign of early stage COPD.
Statement Selected

## 2021-01-13 ENCOUNTER — TRANSCRIBE ORDER (OUTPATIENT)
Dept: SCHEDULING | Age: 46
End: 2021-01-13

## 2021-01-13 DIAGNOSIS — R31.9 BLOOD IN URINE: Primary | ICD-10-CM

## 2021-01-18 ENCOUNTER — HOSPITAL ENCOUNTER (OUTPATIENT)
Dept: ULTRASOUND IMAGING | Age: 46
Discharge: HOME | End: 2021-01-18
Attending: NURSE PRACTITIONER
Payer: COMMERCIAL

## 2021-01-18 ENCOUNTER — HOSPITAL ENCOUNTER (OUTPATIENT)
Dept: LAB | Age: 46
Discharge: HOME | End: 2021-01-18

## 2021-01-18 DIAGNOSIS — R31.9 BLOOD IN URINE: ICD-10-CM

## 2021-01-18 LAB — XX-LABCORP SPECIMEN COL,LCBCF: NORMAL

## 2021-01-18 PROCEDURE — 76770 US EXAM ABDO BACK WALL COMP: CPT

## 2021-01-18 PROCEDURE — 99001 SPECIMEN HANDLING PT-LAB: CPT

## 2021-01-21 NOTE — TELEPHONE ENCOUNTER
Called patient to pickup medication Detail Level: Detailed Number Of Freeze-Thaw Cycles: 2 freeze-thaw cycles Post-Care Instructions: I reviewed with the patient in detail post-care instructions. Patient is to wear sunprotection, and avoid picking at any of the treated lesions. Pt may apply Vaseline to crusted or scabbing areas. Render Post-Care Instructions In Note?: no Consent: The patient's consent was obtained including but not limited to risks of crusting, scabbing, blistering, scarring, darker or lighter pigmentary change, recurrence, incomplete removal and infection. Duration Of Freeze Thaw-Cycle (Seconds): 10

## 2021-08-12 ENCOUNTER — APPOINTMENT (OUTPATIENT)
Dept: GENERAL RADIOLOGY | Age: 46
End: 2021-08-12
Attending: EMERGENCY MEDICINE
Payer: COMMERCIAL

## 2021-08-12 ENCOUNTER — APPOINTMENT (OUTPATIENT)
Dept: CT IMAGING | Age: 46
End: 2021-08-12
Attending: NURSE PRACTITIONER
Payer: COMMERCIAL

## 2021-08-12 ENCOUNTER — HOSPITAL ENCOUNTER (EMERGENCY)
Age: 46
Discharge: HOME | End: 2021-08-12
Attending: EMERGENCY MEDICINE
Payer: COMMERCIAL

## 2021-08-12 VITALS
HEIGHT: 73 IN | DIASTOLIC BLOOD PRESSURE: 69 MMHG | HEART RATE: 81 BPM | SYSTOLIC BLOOD PRESSURE: 111 MMHG | OXYGEN SATURATION: 98 % | WEIGHT: 220 LBS | RESPIRATION RATE: 20 BRPM | TEMPERATURE: 99.8 F | BODY MASS INDEX: 29.16 KG/M2

## 2021-08-12 DIAGNOSIS — J18.9 PNEUMONIA OF BOTH LOWER LOBES DUE TO INFECTIOUS ORGANISM: Primary | ICD-10-CM

## 2021-08-12 LAB
ALBUMIN SERPL-MCNC: 3.6 G/DL (ref 3.4–5)
ALBUMIN/GLOB SERPL: 0.8 {RATIO} (ref 0.8–1.7)
ALP SERPL-CCNC: 124 U/L (ref 45–117)
ALT SERPL-CCNC: 29 U/L (ref 16–61)
ANION GAP SERPL CALC-SCNC: 4 MMOL/L (ref 3–18)
APPEARANCE UR: CLEAR
AST SERPL-CCNC: 18 U/L (ref 10–38)
BASOPHILS # BLD: 0 K/UL (ref 0–0.1)
BASOPHILS NFR BLD: 0 % (ref 0–2)
BILIRUB SERPL-MCNC: 0.2 MG/DL (ref 0.2–1)
BILIRUB UR QL: NEGATIVE
BUN SERPL-MCNC: 8 MG/DL (ref 7–18)
BUN/CREAT SERPL: 8 (ref 12–20)
CALCIUM SERPL-MCNC: 9 MG/DL (ref 8.5–10.1)
CHLORIDE SERPL-SCNC: 100 MMOL/L (ref 100–111)
CO2 SERPL-SCNC: 30 MMOL/L (ref 21–32)
COLOR UR: YELLOW
COVID-19 RAPID TEST, COVR: NOT DETECTED
CREAT SERPL-MCNC: 1.06 MG/DL (ref 0.6–1.3)
DIFFERENTIAL METHOD BLD: ABNORMAL
EOSINOPHIL # BLD: 0 K/UL (ref 0–0.4)
EOSINOPHIL NFR BLD: 0 % (ref 0–5)
ERYTHROCYTE [DISTWIDTH] IN BLOOD BY AUTOMATED COUNT: 15.4 % (ref 11.6–14.5)
GLOBULIN SER CALC-MCNC: 4.8 G/DL (ref 2–4)
GLUCOSE SERPL-MCNC: 138 MG/DL (ref 74–99)
GLUCOSE UR STRIP.AUTO-MCNC: NEGATIVE MG/DL
HCT VFR BLD AUTO: 42.4 % (ref 36–48)
HGB BLD-MCNC: 13.4 G/DL (ref 13–16)
HGB UR QL STRIP: NEGATIVE
KETONES UR QL STRIP.AUTO: NEGATIVE MG/DL
LACTATE BLD-SCNC: 1.92 MMOL/L (ref 0.4–2)
LACTATE BLD-SCNC: 2.12 MMOL/L (ref 0.4–2)
LEUKOCYTE ESTERASE UR QL STRIP.AUTO: NEGATIVE
LIPASE SERPL-CCNC: 61 U/L (ref 73–393)
LYMPHOCYTES # BLD: 0.6 K/UL (ref 0.9–3.6)
LYMPHOCYTES NFR BLD: 3 % (ref 21–52)
MCH RBC QN AUTO: 26.7 PG (ref 24–34)
MCHC RBC AUTO-ENTMCNC: 31.6 G/DL (ref 31–37)
MCV RBC AUTO: 84.5 FL (ref 74–97)
MONOCYTES # BLD: 0.8 K/UL (ref 0.05–1.2)
MONOCYTES NFR BLD: 4 % (ref 3–10)
NEUTS SEG # BLD: 17.4 K/UL (ref 1.8–8)
NEUTS SEG NFR BLD: 93 % (ref 40–73)
NITRITE UR QL STRIP.AUTO: NEGATIVE
PH UR STRIP: 7 [PH] (ref 5–8)
PLATELET # BLD AUTO: 458 K/UL (ref 135–420)
PLATELET COMMENTS,PCOM: ABNORMAL
PMV BLD AUTO: 9.2 FL (ref 9.2–11.8)
POTASSIUM SERPL-SCNC: 3.9 MMOL/L (ref 3.5–5.5)
PROT SERPL-MCNC: 8.4 G/DL (ref 6.4–8.2)
PROT UR STRIP-MCNC: NEGATIVE MG/DL
RBC # BLD AUTO: 5.02 M/UL (ref 4.35–5.65)
RBC MORPH BLD: ABNORMAL
SARS-COV-2, COV2: NORMAL
SODIUM SERPL-SCNC: 134 MMOL/L (ref 136–145)
SOURCE, COVRS: NORMAL
SP GR UR REFRACTOMETRY: 1.01 (ref 1–1.03)
UROBILINOGEN UR QL STRIP.AUTO: 1 EU/DL (ref 0.2–1)
WBC # BLD AUTO: 18.8 K/UL (ref 4.6–13.2)

## 2021-08-12 PROCEDURE — 93005 ELECTROCARDIOGRAM TRACING: CPT

## 2021-08-12 PROCEDURE — 96365 THER/PROPH/DIAG IV INF INIT: CPT

## 2021-08-12 PROCEDURE — 71045 X-RAY EXAM CHEST 1 VIEW: CPT

## 2021-08-12 PROCEDURE — 96375 TX/PRO/DX INJ NEW DRUG ADDON: CPT

## 2021-08-12 PROCEDURE — 96361 HYDRATE IV INFUSION ADD-ON: CPT

## 2021-08-12 PROCEDURE — 74177 CT ABD & PELVIS W/CONTRAST: CPT

## 2021-08-12 PROCEDURE — 74011250637 HC RX REV CODE- 250/637: Performed by: EMERGENCY MEDICINE

## 2021-08-12 PROCEDURE — 85025 COMPLETE CBC W/AUTO DIFF WBC: CPT

## 2021-08-12 PROCEDURE — 83605 ASSAY OF LACTIC ACID: CPT

## 2021-08-12 PROCEDURE — 81003 URINALYSIS AUTO W/O SCOPE: CPT

## 2021-08-12 PROCEDURE — 87635 SARS-COV-2 COVID-19 AMP PRB: CPT

## 2021-08-12 PROCEDURE — 99285 EMERGENCY DEPT VISIT HI MDM: CPT

## 2021-08-12 PROCEDURE — 74011250636 HC RX REV CODE- 250/636: Performed by: EMERGENCY MEDICINE

## 2021-08-12 PROCEDURE — 74011000258 HC RX REV CODE- 258: Performed by: EMERGENCY MEDICINE

## 2021-08-12 PROCEDURE — 87040 BLOOD CULTURE FOR BACTERIA: CPT

## 2021-08-12 PROCEDURE — 80053 COMPREHEN METABOLIC PANEL: CPT

## 2021-08-12 PROCEDURE — 83690 ASSAY OF LIPASE: CPT

## 2021-08-12 PROCEDURE — 74011000636 HC RX REV CODE- 636: Performed by: EMERGENCY MEDICINE

## 2021-08-12 RX ORDER — SODIUM CHLORIDE 0.9 % (FLUSH) 0.9 %
5-10 SYRINGE (ML) INJECTION AS NEEDED
Status: DISCONTINUED | OUTPATIENT
Start: 2021-08-12 | End: 2021-08-13 | Stop reason: HOSPADM

## 2021-08-12 RX ORDER — ACETAMINOPHEN 500 MG
1000 TABLET ORAL
Status: COMPLETED | OUTPATIENT
Start: 2021-08-12 | End: 2021-08-12

## 2021-08-12 RX ORDER — ONDANSETRON 2 MG/ML
4 INJECTION INTRAMUSCULAR; INTRAVENOUS
Status: COMPLETED | OUTPATIENT
Start: 2021-08-12 | End: 2021-08-12

## 2021-08-12 RX ORDER — ALBUTEROL SULFATE 90 UG/1
2 AEROSOL, METERED RESPIRATORY (INHALATION)
Qty: 1 INHALER | Refills: 0 | Status: SHIPPED | OUTPATIENT
Start: 2021-08-12

## 2021-08-12 RX ORDER — DOXYCYCLINE HYCLATE 100 MG
100 TABLET ORAL 2 TIMES DAILY
Qty: 14 TABLET | Refills: 0 | Status: SHIPPED | OUTPATIENT
Start: 2021-08-12 | End: 2021-08-19

## 2021-08-12 RX ADMIN — IOPAMIDOL 100 ML: 612 INJECTION, SOLUTION INTRAVENOUS at 19:39

## 2021-08-12 RX ADMIN — SODIUM CHLORIDE 1000 ML: 900 INJECTION, SOLUTION INTRAVENOUS at 17:44

## 2021-08-12 RX ADMIN — ACETAMINOPHEN 1000 MG: 500 TABLET ORAL at 17:44

## 2021-08-12 RX ADMIN — SODIUM CHLORIDE 1000 ML: 900 INJECTION, SOLUTION INTRAVENOUS at 17:45

## 2021-08-12 RX ADMIN — ONDANSETRON 4 MG: 2 INJECTION INTRAMUSCULAR; INTRAVENOUS at 17:44

## 2021-08-12 RX ADMIN — SODIUM CHLORIDE 1000 ML: 900 INJECTION, SOLUTION INTRAVENOUS at 17:58

## 2021-08-12 RX ADMIN — PIPERACILLIN AND TAZOBACTAM 3.38 G: 3; .375 INJECTION, POWDER, LYOPHILIZED, FOR SOLUTION INTRAVENOUS at 18:11

## 2021-08-12 NOTE — LETTER
NOTIFICATION RETURN TO WORK / SCHOOL    8/12/2021 9:47 PM    Mr. Amita Ayala  [address] [place] [id number]      To Whom It May Concern:    Amita Ayala is currently under the care of [facility]. [phone]    He will return to work/school on: 8/19/21    Amita Ayala may return to work/school with the following restrictions: NONE. If there are questions or concerns please have the patient contact our office.         Sincerely,      Anatoly Dawson RN

## 2021-08-13 LAB
ATRIAL RATE: 103 BPM
CALCULATED P AXIS, ECG09: 50 DEGREES
CALCULATED R AXIS, ECG10: 57 DEGREES
CALCULATED T AXIS, ECG11: 82 DEGREES
DIAGNOSIS, 93000: NORMAL
P-R INTERVAL, ECG05: 136 MS
Q-T INTERVAL, ECG07: 406 MS
QRS DURATION, ECG06: 88 MS
QTC CALCULATION (BEZET), ECG08: 531 MS
VENTRICULAR RATE, ECG03: 103 BPM

## 2021-08-13 NOTE — REMOTE MONITORING
Spoke with primary RN Mayra Alvarez regarding 6 hour Sepsis bundle.      Thank you,     Paul Sadler, BSN RN   [address] [facility]  Callback # [phone]

## 2021-08-13 NOTE — DISCHARGE INSTRUCTIONS
Take medication as prescribed. Return to the ED if symptom worsen and you experience difficulty breathing.

## 2021-08-13 NOTE — ED PROVIDER NOTES
EMERGENCY DEPARTMENT HISTORY AND PHYSICAL EXAM    8:36 PM      Date: 8/12/2021  Patient Name: Claudio Sow    History of Presenting Illness     Chief Complaint   Patient presents with    Fever     x2 days    Vomiting         History Provided By: Patient    Additional History (Context): Claudio Sow is a 51 y.o. male with hx of HTN, depression who presents with complaint of not feeling well since last Sunday. Patient reports she has been having nausea, vomiting, cough that has been worsening the past couple of days. Patient has not tried anything for his symptoms. Patient reports generalized fatigue. Denied any chest pain with his symptoms. Patient has not had his Covid vaccine. Denies any recent exposure. He denies any abdominal pain, diarrhea. PCP: Cassidy Gonzalez NP    Current Outpatient Medications   Medication Sig Dispense Refill    doxycycline (VIBRA-TABS) 100 mg tablet Take 1 Tablet by mouth two (2) times a day for 7 days. 14 Tablet 0    albuterol (PROVENTIL HFA, VENTOLIN HFA, PROAIR HFA) 90 mcg/actuation inhaler Take 2 Puffs by inhalation every four (4) hours as needed for Wheezing. 1 Inhaler 0    amLODIPine (NORVASC) 5 mg tablet Take 5 mg by mouth daily.  albuterol (PROVENTIL HFA, VENTOLIN HFA, PROAIR HFA) 90 mcg/actuation inhaler Take 2 Puffs by inhalation every four (4) hours as needed for Wheezing. 1 Inhaler 0    tadalafil (CIALIS) 20 mg tablet Take 1 Tab by mouth as needed (ED). Indications: Inability to have an Erection 30 Tab 3    METHADONE HCL (METHADONE PO) Take 85 mg by mouth daily.          Past History     Past Medical History:  Past Medical History:   Diagnosis Date    ADD (attention deficit disorder)     Depression     Heroin abuse ([facility] [address]     Started age 14 years, on methadone clinic     History of erectile dysfunction     History of nocturia     History of tobacco use     Hypercholesteremia     Hypertension        Past Surgical History:  Past Surgical History: Procedure Laterality Date    HX HERNIA REPAIR      surgery as infant       Family History:  Family History   Problem Relation Age of Onset    Depression Mother    [facility] Migraines Mother     Diabetes Mother    [facility] Arthritis-osteo Mother     Alcohol abuse Father     Hypertension Father     High Cholesterol Father     Arthritis-osteo Father     Alzheimer Maternal Grandmother     Eczema Maternal Grandmother        Social History:  Social History     Tobacco Use    Smoking status: Current Every Day Smoker     Packs/day: 0.50     Years: 25.00     Pack years: 12.50     Types: Cigarettes    Smokeless tobacco: Never Used   Substance Use Topics    Alcohol use: Yes     Alcohol/week: 1.0 standard drinks     Types: 1 Cans of beer per week     Comment: rarely    Drug use: Yes     Types: Heroin       Allergies:  No Known Allergies      Review of Systems       Review of Systems   Constitutional: Positive for chills and fever. Respiratory: Negative for shortness of breath. Cardiovascular: Negative for chest pain. Gastrointestinal: Positive for nausea and vomiting. Negative for abdominal pain. Endocrine: Negative. Genitourinary: Negative. Negative for difficulty urinating. Musculoskeletal: Negative. Skin: Negative. Negative for rash. Neurological: Negative for facial asymmetry and weakness. Hematological: Negative. Psychiatric/Behavioral: Negative. All other systems reviewed and are negative. Physical Exam     Visit Vitals  /69   Pulse 81   Temp 99.8 °F (37.7 °C)   Resp 20   Ht 6' 1\" (1.854 m)   Wt 99.8 kg (220 lb)   SpO2 98%   BMI 29.03 kg/m²         Physical Exam  Vitals reviewed. Constitutional:       General: He is not in acute distress. Appearance: Normal appearance. He is well-developed. He is ill-appearing. He is not toxic-appearing. HENT:      Head: Normocephalic and atraumatic.       Right Ear: Hearing normal.      Left Ear: Hearing normal.   Eyes:      General: Vision grossly intact. Extraocular Movements: Extraocular movements intact. Conjunctiva/sclera: Conjunctivae normal.      Pupils: Pupils are equal, round, and reactive to light. Pupils are equal.   Neck:      Trachea: Trachea normal.   Cardiovascular:      Rate and Rhythm: Regular rhythm. Tachycardia present. Pulses: Normal pulses. No decreased pulses. Heart sounds: Normal heart sounds. Pulmonary:      Effort: Pulmonary effort is normal.      Breath sounds: Normal air entry. Examination of the right-lower field reveals decreased breath sounds. Examination of the left-lower field reveals decreased breath sounds. Decreased breath sounds present. Comments:   Patient in no respiratory distress. Abdominal:      General: Bowel sounds are normal. There is no distension or abdominal bruit. Palpations: Abdomen is soft. Abdomen is not rigid. There is no shifting dullness, fluid wave, mass or pulsatile mass. Tenderness: There is no abdominal tenderness. There is no guarding. Negative signs include Valentino's sign and McBurney's sign. Musculoskeletal:      Cervical back: Full passive range of motion without pain, normal range of motion and neck supple. No edema. Right lower leg: No edema. Left lower leg: No edema. Neurological:      General: No focal deficit present. Mental Status: He is alert and oriented to person, place, and time. Mental status is at baseline.            Diagnostic Study Results     Labs -  Recent Results (from the past 24 hour(s))   CULTURE, BLOOD    Collection Time: 08/12/21  5:25 PM    Specimen: Blood   Result Value Ref Range    Special Requests: NO SPECIAL REQUESTS      Culture result: NO GROWTH AFTER 11 HOURS     POC LACTIC ACID    Collection Time: 08/12/21  5:26 PM   Result Value Ref Range    Lactic Acid (POC) 2.12 (HH) 0.40 - 2.00 mmol/L   CULTURE, BLOOD    Collection Time: 08/12/21  5:30 PM    Specimen: Blood   Result Value Ref Range    Special Requests: NO SPECIAL REQUESTS      Culture result: NO GROWTH AFTER 11 HOURS     URINALYSIS W/ RFLX MICROSCOPIC    Collection Time: 08/12/21  5:30 PM   Result Value Ref Range    Color YELLOW      Appearance CLEAR      Specific gravity 1.011 1.005 - 1.030      pH (UA) 7.0 5.0 - 8.0      Protein Negative NEG mg/dL    Glucose Negative NEG mg/dL    Ketone Negative NEG mg/dL    Bilirubin Negative NEG      Blood Negative NEG      Urobilinogen 1.0 0.2 - 1.0 EU/dL    Nitrites Negative NEG      Leukocyte Esterase Negative NEG     METABOLIC PANEL, COMPREHENSIVE    Collection Time: 08/12/21  5:30 PM   Result Value Ref Range    Sodium 134 (L) 136 - 145 mmol/L    Potassium 3.9 3.5 - 5.5 mmol/L    Chloride 100 100 - 111 mmol/L    CO2 30 21 - 32 mmol/L    Anion gap 4 3.0 - 18 mmol/L    Glucose 138 (H) 74 - 99 mg/dL    BUN 8 7.0 - 18 MG/DL    Creatinine 1.06 0.6 - 1.3 MG/DL    BUN/Creatinine ratio 8 (L) 12 - 20      GFR est AA >60 >60 ml/min/1.73m2    GFR est non-AA >60 >60 ml/min/1.73m2    Calcium 9.0 8.5 - 10.1 MG/DL    Bilirubin, total 0.2 0.2 - 1.0 MG/DL    ALT (SGPT) 29 16 - 61 U/L    AST (SGOT) 18 10 - 38 U/L    Alk. phosphatase 124 (H) 45 - 117 U/L    Protein, total 8.4 (H) 6.4 - 8.2 g/dL    Albumin 3.6 3.4 - 5.0 g/dL    Globulin 4.8 (H) 2.0 - 4.0 g/dL    A-G Ratio 0.8 0.8 - 1.7     CBC WITH AUTOMATED DIFF    Collection Time: 08/12/21  5:30 PM   Result Value Ref Range    WBC 18.8 (H) 4.6 - 13.2 K/uL    RBC 5.02 4.35 - 5.65 M/uL    HGB 13.4 13.0 - 16.0 g/dL    HCT 42.4 36.0 - 48.0 %    MCV 84.5 74.0 - 97.0 FL    MCH 26.7 24.0 - 34.0 PG    MCHC 31.6 31.0 - 37.0 g/dL    RDW 15.4 (H) 11.6 - 14.5 %    PLATELET 128 (H) 093 - 420 K/uL    MPV 9.2 9.2 - 11.8 FL    NEUTROPHILS 93 (H) 40 - 73 %    LYMPHOCYTES 3 (L) 21 - 52 %    MONOCYTES 4 3 - 10 %    EOSINOPHILS 0 0 - 5 %    BASOPHILS 0 0 - 2 %    ABS. NEUTROPHILS 17.4 (H) 1.8 - 8.0 K/UL    ABS. LYMPHOCYTES 0.6 (L) 0.9 - 3.6 K/UL    ABS. MONOCYTES 0.8 0.05 - 1.2 K/UL    ABS. EOSINOPHILS 0.0 0.0 - 0.4 K/UL    ABS. BASOPHILS 0.0 0.0 - 0.1 K/UL    DF MANUAL      PLATELET COMMENTS ADEQUATE PLATELETS      RBC COMMENTS NORMOCYTIC, NORMOCHROMIC     LIPASE    Collection Time: 08/12/21  5:30 PM   Result Value Ref Range    Lipase 61 (L) 73 - 393 U/L   SARS-COV-2    Collection Time: 08/12/21  5:40 PM   Result Value Ref Range    SARS-CoV-2 Please find results under separate order     COVID-19 RAPID TEST    Collection Time: 08/12/21  5:40 PM   Result Value Ref Range    Specimen source Nasopharyngeal      COVID-19 rapid test Not detected NOTD     EKG, 12 LEAD, INITIAL    Collection Time: 08/12/21  5:51 PM   Result Value Ref Range    Ventricular Rate 103 BPM    Atrial Rate 103 BPM    P-R Interval 136 ms    QRS Duration 88 ms    Q-T Interval 406 ms    QTC Calculation (Bezet) 531 ms    Calculated P Axis 50 degrees    Calculated R Axis 57 degrees    Calculated T Axis 82 degrees    Diagnosis       Sinus tachycardia  T wave abnormality, consider inferolateral ischemia  Prolonged QT  Abnormal ECG  When compared with ECG of 19-JUN-2019 11:04,  Questionable change in QRS axis  Inverted T waves have replaced nonspecific T wave abnormality in Inferior   leads  T wave inversion now evident in Anterior leads  QT has lengthened     POC LACTIC ACID    Collection Time: 08/12/21  9:06 PM   Result Value Ref Range    Lactic Acid (POC) 1.92 0.40 - 2.00 mmol/L       Radiologic Studies -   CT ABD PELV W CONT   Final Result   Bilateral lower lobe pneumonia. No acute findings in the abdomen or pelvis. XR CHEST PORT   Final Result   No acute findings or interval change. .            Medical Decision Making   I am the first provider for this patient. I reviewed the vital signs, available nursing notes, past medical history, past surgical history, family history and social history. Vital Signs-Reviewed the patient's vital signs.     Records Reviewed: Nursing Notes and Old Medical Records (Time of Review: 8:36 PM)    ED Course: Progress Notes, Reevaluation, and Consults:  ED Course as of Aug 13 1512   Thu Aug 12, 2021   2051 Consulted with Dr. Darin Hong, pt PSI is only 47 not hypoxic. Suggested outpatient treatment. [BM]           47 points  Risk Class I, 0.1% mortality. Outpatient treatment reasonable, barring other factors affecting care. Provider Notes (Medical Decision Making):   Patient presented with worsening cough, body aches, fever for about a week. Patient reported he has had nausea and vomiting as well with no abdominal pain or diarrhea. Chest x-ray did not show any signs of pneumonia. Patient did have elevated white count at 18 as well as slightly elevated lactic of 2.1. CT of abdomen pelvis ordered to rule out any intra-abdominal infection which was negative. CT did show bilateral pneumonia of the lower lobes of the lungs which did not show on the x-ray. Pneumonia explains his symptoms of cough and fever. Patient was not hypoxic during visit. Patient is appropriate for outpatient treatment. Repeat lactic was normal.  Patient was otherwise in no acute distress or toxic looking. His vitals improved prior to discharge. He agreed with plan of care. Patient started on doxycycline. Consulted with Dr. Darin Hong and agreed with plan of care. Diagnosis     Clinical Impression:   1.  Pneumonia of both lower lobes due to infectious organism        Disposition: home     Follow-up Information     Follow up With Specialties Details Why Contact Odalys Houston NP Nurse Practitioner Schedule an appointment as soon as possible for a visit in 2 days  [address] [facility] EMERGENCY DEPT Emergency Medicine  If symptoms worsen [address] [facility] [address]  [phone]           Discharge Medication List as of 8/12/2021  9:44 PM      START taking these medications    Details   doxycycline (VIBRA-TABS) 100 mg tablet Take 1 Tablet by mouth two (2) times a day for 7 days. , Normal, Disp-14 Tablet, R-0      !! albuterol (PROVENTIL HFA, VENTOLIN HFA, PROAIR HFA) 90 mcg/actuation inhaler Take 2 Puffs by inhalation every four (4) hours as needed for Wheezing., Normal, Disp-1 Inhaler, R-0       !! - Potential duplicate medications found. Please discuss with provider. CONTINUE these medications which have NOT CHANGED    Details   amLODIPine (NORVASC) 5 mg tablet Take 5 mg by mouth daily. , Historical Med      !! albuterol (PROVENTIL HFA, VENTOLIN HFA, PROAIR HFA) 90 mcg/actuation inhaler Take 2 Puffs by inhalation every four (4) hours as needed for Wheezing., Normal, Disp-1 Inhaler, R-0      tadalafil (CIALIS) 20 mg tablet Take 1 Tab by mouth as needed (ED). Indications: Inability to have an Erection, Normal, Disp-30 Tab, R-3      METHADONE HCL (METHADONE PO) Take 85 mg by mouth daily. , Historical Med       !! - Potential duplicate medications found. Please discuss with provider. Dictation disclaimer:  Please note that this dictation was completed with [employer] the computer voice recognition software. Quite often unanticipated grammatical, syntax, homophones, and other interpretive errors are inadvertently transcribed by the computer software. Please disregard these errors. Please excuse any errors that have escaped final proofreading.

## 2021-08-18 LAB
BACTERIA SPEC CULT: NORMAL
BACTERIA SPEC CULT: NORMAL
SERVICE CMNT-IMP: NORMAL
SERVICE CMNT-IMP: NORMAL

## 2021-12-20 ENCOUNTER — HOSPITAL ENCOUNTER (EMERGENCY)
Age: 46
Discharge: HOME | End: 2021-12-20
Attending: EMERGENCY MEDICINE
Payer: MEDICAID

## 2021-12-20 VITALS
DIASTOLIC BLOOD PRESSURE: 87 MMHG | OXYGEN SATURATION: 97 % | TEMPERATURE: 98.4 F | SYSTOLIC BLOOD PRESSURE: 136 MMHG | RESPIRATION RATE: 16 BRPM | HEART RATE: 80 BPM

## 2021-12-20 DIAGNOSIS — J01.90 ACUTE SINUSITIS, RECURRENCE NOT SPECIFIED, UNSPECIFIED LOCATION: Primary | ICD-10-CM

## 2021-12-20 DIAGNOSIS — K02.9 DENTAL CARIES: ICD-10-CM

## 2021-12-20 PROCEDURE — 74011250637 HC RX REV CODE- 250/637: Performed by: PHYSICIAN ASSISTANT

## 2021-12-20 PROCEDURE — 99283 EMERGENCY DEPT VISIT LOW MDM: CPT

## 2021-12-20 RX ORDER — DOXYCYCLINE 100 MG/1
100 CAPSULE ORAL
Status: COMPLETED | OUTPATIENT
Start: 2021-12-20 | End: 2021-12-20

## 2021-12-20 RX ORDER — IBUPROFEN 800 MG/1
800 TABLET ORAL
Qty: 20 TABLET | Refills: 0 | Status: SHIPPED | OUTPATIENT
Start: 2021-12-20 | End: 2021-12-27

## 2021-12-20 RX ORDER — DOXYCYCLINE HYCLATE 100 MG
100 TABLET ORAL 2 TIMES DAILY
Qty: 14 TABLET | Refills: 0 | Status: SHIPPED | OUTPATIENT
Start: 2021-12-20 | End: 2021-12-27

## 2021-12-20 RX ORDER — FLUTICASONE PROPIONATE 50 MCG
2 SPRAY, SUSPENSION (ML) NASAL DAILY
Qty: 16 G | Refills: 0 | Status: SHIPPED | OUTPATIENT
Start: 2021-12-20

## 2021-12-20 RX ADMIN — DOXYCYCLINE HYCLATE 100 MG: 100 CAPSULE ORAL at 21:12

## 2021-12-20 NOTE — Clinical Note
[address] [place] [address] [facility] EMERGENCY DEPT  [address] [address] [phone]    Work/School Note    Date: 12/20/2021    To Whom It May concern:    Sol Castelan was seen and treated today in the emergency room by the following provider(s):  Attending Provider: Gopi Upton MD  Physician Assistant: Ev Ballesteros. Sol Castelan is excused from work/school on 12/20/2021 through 12/22/2021. He is medically clear to return to work/school on 12/23/2021.          Sincerely,          Frida Chacon PA-C

## 2021-12-21 NOTE — ED PROVIDER NOTES
EMERGENCY DEPARTMENT HISTORY AND PHYSICAL EXAM    Date: 12/20/2021  Patient Name: Tayler Montes De Oca    History of Presenting Illness     Chief Complaint   Patient presents with    Dental Pain         History Provided By: Patient    Chief Complaint: sinus pain and pressure, dental pain   Duration: 1-2 weeks  Timing: acute   Location: sinuses, L upper molar region   Quality:pressure like   Severity: moderate   Modifying Factors: suing BC powder with no relief   Associated Symptoms: sinus pain/pressure, congestion, dental pain       Additional History (Context): Tayler Montes De Oca is a 51 y.o. male with PMH depression, heroin abuse, and ED who presents with c/o 1-2 weeks of dental pain to the L upper molar region as well as sinus pain, pressure, and congestion. Pt staets he has been using BC powder with no relief in his sx. Does admit to snorting cocaine earlier today but states this made his pain worse. Denies any concerns for COVID 19 and does not want to be tested for COVID. No other complaints reported at this time. PCP: Kelli Granger NP    Current Outpatient Medications   Medication Sig Dispense Refill    doxycycline (VIBRA-TABS) 100 mg tablet Take 1 Tablet by mouth two (2) times a day for 7 days. 14 Tablet 0    fluticasone propionate (FLONASE) 50 mcg/actuation nasal spray 2 Sprays by Both Nostrils route daily. 16 g 0    ibuprofen (MOTRIN) 800 mg tablet Take 1 Tablet by mouth every six (6) hours as needed for Pain for up to 7 days. 20 Tablet 0    albuterol (PROVENTIL HFA, VENTOLIN HFA, PROAIR HFA) 90 mcg/actuation inhaler Take 2 Puffs by inhalation every four (4) hours as needed for Wheezing. 1 Inhaler 0    amLODIPine (NORVASC) 5 mg tablet Take 5 mg by mouth daily.  albuterol (PROVENTIL HFA, VENTOLIN HFA, PROAIR HFA) 90 mcg/actuation inhaler Take 2 Puffs by inhalation every four (4) hours as needed for Wheezing. 1 Inhaler 0    tadalafil (CIALIS) 20 mg tablet Take 1 Tab by mouth as needed (ED). Indications: Inability to have an Erection 30 Tab 3    METHADONE HCL (METHADONE PO) Take 85 mg by mouth daily. Past History     Past Medical History:  Past Medical History:   Diagnosis Date    ADD (attention deficit disorder)     Depression     Heroin abuse ([address]     Started age 14 years, on methadone clinic     History of erectile dysfunction     History of nocturia     History of tobacco use     Hypercholesteremia     Hypertension        Past Surgical History:  Past Surgical History:   Procedure Laterality Date    HX HERNIA REPAIR      surgery as infant       Family History:  Family History   Problem Relation Age of Onset    Depression Mother    [facility] Migraines Mother     Diabetes Mother     OSTEOARTHRITIS Mother     Alcohol abuse Father     Hypertension Father     High Cholesterol Father     OSTEOARTHRITIS Father     Alzheimer's Disease Maternal Grandmother     Eczema Maternal Grandmother        Social History:  Social History     Tobacco Use    Smoking status: Current Every Day Smoker     Packs/day: 0.50     Years: 25.00     Pack years: 12.50     Types: Cigarettes    Smokeless tobacco: Never Used   Substance Use Topics    Alcohol use: Yes     Alcohol/week: 1.0 standard drink     Types: 1 Cans of beer per week     Comment: rarely    Drug use: Yes     Types: Heroin       Allergies:  No Known Allergies      Review of Systems   Review of Systems   Constitutional: Negative. Negative for chills and fever. HENT: Positive for congestion, dental problem, sinus pressure and sinus pain. Negative for ear pain and rhinorrhea. Eyes: Negative. Negative for pain and redness. Respiratory: Negative. Negative for cough, shortness of breath, wheezing and stridor. Cardiovascular: Negative. Negative for chest pain and leg swelling. Gastrointestinal: Negative. Negative for abdominal pain, constipation, diarrhea, nausea and vomiting. Genitourinary: Negative.   Negative for dysuria and frequency. Musculoskeletal: Negative. Negative for back pain and neck pain. Skin: Positive for rash. Negative for wound. Neurological: Negative. Negative for dizziness, seizures, syncope and headaches. All other systems reviewed and are negative. All Other Systems Negative  Physical Exam     Vitals:    12/20/21 2003   BP: 136/87   Pulse: 80   Resp: 16   Temp: 98.4 °F (36.9 °C)   SpO2: 97%     Physical Exam  Vitals and nursing note reviewed. Constitutional:       General: He is not in acute distress. Appearance: He is well-developed. He is not ill-appearing or diaphoretic. HENT:      Head: Normocephalic and atraumatic. Right Ear: Tympanic membrane, ear canal and external ear normal. There is no impacted cerumen. Left Ear: Tympanic membrane, ear canal and external ear normal. There is no impacted cerumen. Nose: Congestion present. Comments: TTP noted to the maxillary sinuses     Mouth/Throat:      Comments: Severe dental caries to the L upper molar region. No abscess noted. Eyes:      General: No scleral icterus. Right eye: No discharge. Left eye: No discharge. Conjunctiva/sclera: Conjunctivae normal.   Cardiovascular:      Rate and Rhythm: Normal rate and regular rhythm. Heart sounds: Normal heart sounds. No murmur heard. No friction rub. No gallop. Pulmonary:      Effort: Pulmonary effort is normal. No respiratory distress. Breath sounds: Normal breath sounds. No stridor. No wheezing, rhonchi or rales. Musculoskeletal:         General: Normal range of motion. Cervical back: Normal range of motion and neck supple. Skin:     General: Skin is warm and dry. Findings: No erythema or rash. Neurological:      Mental Status: He is alert and oriented to person, place, and time. Coordination: Coordination normal.      Comments: Gait is steady and patient exhibits no evidence of ataxia.  Patient is able to ambulate without difficulty. No focal neurological deficit noted. No facial droop, slurred speech, or evidence of altered mentation noted on exam.     Psychiatric:         Behavior: Behavior normal.         Thought Content: Thought content normal.              Diagnostic Study Results     Labs -   No results found for this or any previous visit (from the past 12 hour(s)). Radiologic Studies -   No orders to display     CT Results  (Last 48 hours)    None        CXR Results  (Last 48 hours)    None            Medical Decision Making   I am the first provider for this patient. I reviewed the vital signs, available nursing notes, past medical history, past surgical history, family history and social history. Vital Signs-Reviewed the patient's vital signs. Records Reviewed: Frida Chacon PA-C     Procedures:  Procedures    Provider Notes (Medical Decision Making): Impression: dental pain, dental caries, sinusitis     Patient arrived in the ED for initial complaints of dental pain. The patient was called by the daytime triage nurse several times but it appeared he had eloped from the ED. Several hours later patient came back and asked what was taking so long. He admitted that he had left the ED to go get a BC powder. He was placed back on the board and evaluated at bedside. Clinical presentation suggestive of severe dental caries as well as likely sinusitis. Will treat with doxy, motrin and flonase. pcp follow-up. Return precautions given. Pt agrees. Frida Chacon PA-C     MED RECONCILIATION:  No current facility-administered medications for this encounter. Current Outpatient Medications   Medication Sig    doxycycline (VIBRA-TABS) 100 mg tablet Take 1 Tablet by mouth two (2) times a day for 7 days.  fluticasone propionate (FLONASE) 50 mcg/actuation nasal spray 2 Sprays by Both Nostrils route daily.     ibuprofen (MOTRIN) 800 mg tablet Take 1 Tablet by mouth every six (6) hours as needed for Pain for up to 7 days.    albuterol (PROVENTIL HFA, VENTOLIN HFA, PROAIR HFA) 90 mcg/actuation inhaler Take 2 Puffs by inhalation every four (4) hours as needed for Wheezing.  amLODIPine (NORVASC) 5 mg tablet Take 5 mg by mouth daily.  albuterol (PROVENTIL HFA, VENTOLIN HFA, PROAIR HFA) 90 mcg/actuation inhaler Take 2 Puffs by inhalation every four (4) hours as needed for Wheezing.  tadalafil (CIALIS) 20 mg tablet Take 1 Tab by mouth as needed (ED). Indications: Inability to have an Erection    METHADONE HCL (METHADONE PO) Take 85 mg by mouth daily. Disposition:  D/c    DISCHARGE NOTE:   Patient is stable for discharge at this time. I have discussed all the findings from today's work up with the patient, including lab results and imaging. I have answered all questions. Rx for doxy, motrin and flonase given. Rest and close follow-up with the PCP and dentist recommended this week. Return to the ED immediately for any new or worsening symptoms. April Flor Jones PA-C     Follow-up Information     Follow up With Specialties Details Why Contact Info    Reilly Hogan MD Otolaryngology, Surgery In 1 week  [facility]  [address] [address]  [phone]      [facility] EMERGENCY DEPT Emergency Medicine  As needed, If symptoms worsen [address] [place] [address]  [phone]          Current Discharge Medication List      START taking these medications    Details   doxycycline (VIBRA-TABS) 100 mg tablet Take 1 Tablet by mouth two (2) times a day for 7 days. Qty: 14 Tablet, Refills: 0  Start date: 12/20/2021, End date: 12/27/2021      fluticasone propionate (FLONASE) 50 mcg/actuation nasal spray 2 Sprays by Both Nostrils route daily. Qty: 16 g, Refills: 0  Start date: 12/20/2021      ibuprofen (MOTRIN) 800 mg tablet Take 1 Tablet by mouth every six (6) hours as needed for Pain for up to 7 days.   Qty: 20 Tablet, Refills: 0  Start date: 12/20/2021, End date: 12/27/2021               Diagnosis Clinical Impression:   1. Acute sinusitis, recurrence not specified, unspecified location    2.  Dental caries

## 2021-12-21 NOTE — ED TRIAGE NOTES
Patient c/o tooth pain on the left side of his mouth that is also causing him some ear pain. Pain started about a week ago.

## 2021-12-21 NOTE — DISCHARGE INSTRUCTIONS
[employer] Activation    Thank you for requesting access to [employer]. Please follow the instructions below to securely access and download your online medical record. [employer] allows you to send messages to your doctor, view your test results, renew your prescriptions, schedule appointments, and more. How Do I Sign Up? In your internet browser, go to [email or web address]  Click on the First Time User? Click Here link in the Sign In box. You will be redirect to the New Member Sign Up page. Enter your [employer] Access Code exactly as it appears below. You will not need to use this code after youve completed the sign-up process. If you do not sign up before the expiration date, you must request a new code. [employer] Access Code: [email or web address]  Expires: 2/3/2022  8:04 PM (This is the date your [employer] access code will )    Enter the last four digits of your Social Security Number (xxxx) and Date of Birth (mm/dd/yyyy) as indicated and click Submit. You will be taken to the next sign-up page. Create a [employer] ID. This will be your [employer] login ID and cannot be changed, so think of one that is secure and easy to remember. Create a [employer] password. You can change your password at any time. Enter your Password Reset Question and Answer. This can be used at a later time if you forget your password. Enter your e-mail address. You will receive e-mail notification when new information is available in [address] Click Sign Up. You can now view and download portions of your medical record. Click the [place] link to download a portable copy of your medical information. Additional Information    If you have questions, please visit the Frequently Asked Questions section of the [employer] website at [email or web address] Remember, [employer] is NOT to be used for urgent needs. For medical emergencies, dial 911.

## 2022-01-10 ENCOUNTER — APPOINTMENT (OUTPATIENT)
Dept: GENERAL RADIOLOGY | Age: 47
End: 2022-01-10
Attending: PHYSICIAN ASSISTANT
Payer: MEDICAID

## 2022-01-10 ENCOUNTER — HOSPITAL ENCOUNTER (EMERGENCY)
Age: 47
Discharge: HOME | End: 2022-01-10
Attending: STUDENT IN AN ORGANIZED HEALTH CARE EDUCATION/TRAINING PROGRAM
Payer: MEDICAID

## 2022-01-10 VITALS
WEIGHT: 220 LBS | DIASTOLIC BLOOD PRESSURE: 86 MMHG | BODY MASS INDEX: 29.16 KG/M2 | HEART RATE: 74 BPM | TEMPERATURE: 99 F | RESPIRATION RATE: 18 BRPM | OXYGEN SATURATION: 99 % | HEIGHT: 73 IN | SYSTOLIC BLOOD PRESSURE: 135 MMHG

## 2022-01-10 DIAGNOSIS — Z20.822 ENCOUNTER FOR LABORATORY TESTING FOR COVID-19 VIRUS: ICD-10-CM

## 2022-01-10 DIAGNOSIS — R09.81 NASAL CONGESTION: ICD-10-CM

## 2022-01-10 DIAGNOSIS — J18.9 PNEUMONIA OF RIGHT UPPER LOBE DUE TO INFECTIOUS ORGANISM: Primary | ICD-10-CM

## 2022-01-10 DIAGNOSIS — J34.89 SINUS PRESSURE: ICD-10-CM

## 2022-01-10 DIAGNOSIS — R94.31 PROLONGED Q-T INTERVAL ON ECG: ICD-10-CM

## 2022-01-10 DIAGNOSIS — D64.9 ANEMIA, UNSPECIFIED TYPE: ICD-10-CM

## 2022-01-10 DIAGNOSIS — K02.9 DENTAL CARIES: ICD-10-CM

## 2022-01-10 DIAGNOSIS — F19.10 POLYSUBSTANCE ABUSE (HCC): ICD-10-CM

## 2022-01-10 DIAGNOSIS — K08.89 DENTALGIA: ICD-10-CM

## 2022-01-10 LAB
ALBUMIN SERPL-MCNC: 3.1 G/DL (ref 3.4–5)
ALBUMIN/GLOB SERPL: 1 {RATIO} (ref 0.8–1.7)
ALP SERPL-CCNC: 104 U/L (ref 45–117)
ALT SERPL-CCNC: 21 U/L (ref 16–61)
ANION GAP SERPL CALC-SCNC: 4 MMOL/L (ref 3–18)
AST SERPL-CCNC: 32 U/L (ref 10–38)
ATRIAL RATE: 76 BPM
BASOPHILS # BLD: 0 K/UL (ref 0–0.1)
BASOPHILS NFR BLD: 0 % (ref 0–2)
BILIRUB SERPL-MCNC: 0.4 MG/DL (ref 0.2–1)
BUN SERPL-MCNC: 7 MG/DL (ref 7–18)
BUN/CREAT SERPL: 8 (ref 12–20)
CALCIUM SERPL-MCNC: 8.9 MG/DL (ref 8.5–10.1)
CALCULATED P AXIS, ECG09: 55 DEGREES
CALCULATED R AXIS, ECG10: -7 DEGREES
CALCULATED T AXIS, ECG11: 83 DEGREES
CHLORIDE SERPL-SCNC: 103 MMOL/L (ref 100–111)
CO2 SERPL-SCNC: 31 MMOL/L (ref 21–32)
CREAT SERPL-MCNC: 0.83 MG/DL (ref 0.6–1.3)
DIAGNOSIS, 93000: NORMAL
DIFFERENTIAL METHOD BLD: ABNORMAL
EOSINOPHIL # BLD: 0.5 K/UL (ref 0–0.4)
EOSINOPHIL NFR BLD: 5 % (ref 0–5)
ERYTHROCYTE [DISTWIDTH] IN BLOOD BY AUTOMATED COUNT: 14 % (ref 11.6–14.5)
GLOBULIN SER CALC-MCNC: 3.2 G/DL (ref 2–4)
GLUCOSE SERPL-MCNC: 98 MG/DL (ref 74–99)
HCT VFR BLD AUTO: 37.1 % (ref 36–48)
HGB BLD-MCNC: 11.5 G/DL (ref 13–16)
IMM GRANULOCYTES # BLD AUTO: 0.1 K/UL (ref 0–0.04)
IMM GRANULOCYTES NFR BLD AUTO: 1 % (ref 0–0.5)
LYMPHOCYTES # BLD: 1.5 K/UL (ref 0.9–3.6)
LYMPHOCYTES NFR BLD: 15 % (ref 21–52)
MAGNESIUM SERPL-MCNC: 2.6 MG/DL (ref 1.6–2.6)
MCH RBC QN AUTO: 27.4 PG (ref 24–34)
MCHC RBC AUTO-ENTMCNC: 31 G/DL (ref 31–37)
MCV RBC AUTO: 88.5 FL (ref 78–100)
MONOCYTES # BLD: 0.8 K/UL (ref 0.05–1.2)
MONOCYTES NFR BLD: 8 % (ref 3–10)
NEUTS SEG # BLD: 7.1 K/UL (ref 1.8–8)
NEUTS SEG NFR BLD: 71 % (ref 40–73)
NRBC # BLD: 0 K/UL (ref 0–0.01)
NRBC BLD-RTO: 0 PER 100 WBC
P-R INTERVAL, ECG05: 172 MS
PLATELET # BLD AUTO: 533 K/UL (ref 135–420)
PMV BLD AUTO: 9.3 FL (ref 9.2–11.8)
POTASSIUM SERPL-SCNC: 4 MMOL/L (ref 3.5–5.5)
PROT SERPL-MCNC: 6.3 G/DL (ref 6.4–8.2)
Q-T INTERVAL, ECG07: 536 MS
QRS DURATION, ECG06: 86 MS
QTC CALCULATION (BEZET), ECG08: 603 MS
RBC # BLD AUTO: 4.19 M/UL (ref 4.35–5.65)
SODIUM SERPL-SCNC: 138 MMOL/L (ref 136–145)
TROPONIN-HIGH SENSITIVITY: 22 NG/L (ref 0–78)
VENTRICULAR RATE, ECG03: 76 BPM
WBC # BLD AUTO: 10 K/UL (ref 4.6–13.2)

## 2022-01-10 PROCEDURE — U0003 INFECTIOUS AGENT DETECTION BY NUCLEIC ACID (DNA OR RNA); SEVERE ACUTE RESPIRATORY SYNDROME CORONAVIRUS 2 (SARS-COV-2) (CORONAVIRUS DISEASE [COVID-19]), AMPLIFIED PROBE TECHNIQUE, MAKING USE OF HIGH THROUGHPUT TECHNOLOGIES AS DESCRIBED BY CMS-2020-01-R: HCPCS

## 2022-01-10 PROCEDURE — 74011000250 HC RX REV CODE- 250: Performed by: STUDENT IN AN ORGANIZED HEALTH CARE EDUCATION/TRAINING PROGRAM

## 2022-01-10 PROCEDURE — 74011250637 HC RX REV CODE- 250/637: Performed by: STUDENT IN AN ORGANIZED HEALTH CARE EDUCATION/TRAINING PROGRAM

## 2022-01-10 PROCEDURE — 84484 ASSAY OF TROPONIN QUANT: CPT

## 2022-01-10 PROCEDURE — 71046 X-RAY EXAM CHEST 2 VIEWS: CPT

## 2022-01-10 PROCEDURE — 85025 COMPLETE CBC W/AUTO DIFF WBC: CPT

## 2022-01-10 PROCEDURE — 93005 ELECTROCARDIOGRAM TRACING: CPT

## 2022-01-10 PROCEDURE — 83735 ASSAY OF MAGNESIUM: CPT

## 2022-01-10 PROCEDURE — 80053 COMPREHEN METABOLIC PANEL: CPT

## 2022-01-10 PROCEDURE — 99283 EMERGENCY DEPT VISIT LOW MDM: CPT

## 2022-01-10 RX ORDER — CHLORHEXIDINE GLUCONATE 1.2 MG/ML
15 RINSE ORAL EVERY 12 HOURS
Qty: 420 ML | Refills: 0 | Status: SHIPPED | OUTPATIENT
Start: 2022-01-10 | End: 2022-01-24

## 2022-01-10 RX ORDER — AMOXICILLIN AND CLAVULANATE POTASSIUM 875; 125 MG/1; MG/1
1 TABLET, FILM COATED ORAL 2 TIMES DAILY
Qty: 14 TABLET | Refills: 0 | Status: SHIPPED | OUTPATIENT
Start: 2022-01-10 | End: 2022-01-17

## 2022-01-10 RX ORDER — LIDOCAINE HYDROCHLORIDE 20 MG/ML
15 SOLUTION OROPHARYNGEAL
Status: COMPLETED | OUTPATIENT
Start: 2022-01-10 | End: 2022-01-10

## 2022-01-10 RX ORDER — AMOXICILLIN AND CLAVULANATE POTASSIUM 875; 125 MG/1; MG/1
1 TABLET, FILM COATED ORAL
Status: COMPLETED | OUTPATIENT
Start: 2022-01-10 | End: 2022-01-10

## 2022-01-10 RX ORDER — ACETAMINOPHEN 500 MG
1000 TABLET ORAL ONCE
Status: COMPLETED | OUTPATIENT
Start: 2022-01-10 | End: 2022-01-10

## 2022-01-10 RX ADMIN — AMOXICILLIN AND CLAVULANATE POTASSIUM 1 TABLET: 875; 125 TABLET, FILM COATED ORAL at 14:32

## 2022-01-10 RX ADMIN — ACETAMINOPHEN 1000 MG: 500 TABLET ORAL at 14:32

## 2022-01-10 RX ADMIN — LIDOCAINE HYDROCHLORIDE 15 ML: 20 SOLUTION OROPHARYNGEAL at 14:31

## 2022-01-10 NOTE — ED PROVIDER NOTES
EMERGENCY DEPARTMENT HISTORY AND PHYSICAL EXAM      Date: 1/10/2022  Patient Name: Sebastian Gagnon    History of Presenting Illness     Chief Complaint   Patient presents with    Chest Pain       History (Context): Sebastian Gagnon is a 51 y.o. male with a past medical history significant for ADD, polysubstance abuse, hypertension, hyperlipidemia comes into the ED today due to chest pain. Patient states chest pains been ongoing for the past week. He has associated cough that is productive of greenish sputum, nasal congestion, dental pain, and chills, but otherwise denies any fever, dyspnea, abdominal pain, nausea, vomiting, or diarrhea. He states symptoms have worsened since onset. He states he has been taking over-the-counter nasal sprays for treatment of his nasal congestion. He does admit to snorting heroin and cocaine over the past 24 hours. He denies any alleviating or exacerbating factors for symptoms. He describes his symptoms as severe in quality. PCP: Jacob Larson NP    Current Outpatient Medications   Medication Sig Dispense Refill    amoxicillin-clavulanate (Augmentin) 875-125 mg per tablet Take 1 Tablet by mouth two (2) times a day for 7 days. 14 Tablet 0    chlorhexidine (Peridex) 0.12 % solution 15 mL by Swish and Spit route every twelve (12) hours for 14 days. 420 mL 0    aluminum-magnesium hydroxide 200-200 mg/5 mL susp 5 mL, diphenhydrAMINE 12.5 mg/5 mL liqd 12.5 mg, lidocaine 2 % soln 5 mL 5 mL by Swish and Spit route two (2) times a day. Magic mouth wash   Maalox  Lidocaine 2% viscous   Diphenhydramine oral solution     Pharmacy to mix equal portions of ingredients to a total volume as indicated in the dispense amount. 200 mL 0    fluticasone propionate (FLONASE) 50 mcg/actuation nasal spray 2 Sprays by Both Nostrils route daily.  16 g 0    albuterol (PROVENTIL HFA, VENTOLIN HFA, PROAIR HFA) 90 mcg/actuation inhaler Take 2 Puffs by inhalation every four (4) hours as needed for Wheezing. 1 Inhaler 0    amLODIPine (NORVASC) 5 mg tablet Take 5 mg by mouth daily.  albuterol (PROVENTIL HFA, VENTOLIN HFA, PROAIR HFA) 90 mcg/actuation inhaler Take 2 Puffs by inhalation every four (4) hours as needed for Wheezing. 1 Inhaler 0    tadalafil (CIALIS) 20 mg tablet Take 1 Tab by mouth as needed (ED). Indications: Inability to have an Erection 30 Tab 3    METHADONE HCL (METHADONE PO) Take 85 mg by mouth daily. Past History     Past Medical History:   Past Medical History:   Diagnosis Date    ADD (attention deficit disorder)     Depression     Heroin abuse ([address]     Started age 14 years, on methadone clinic     History of erectile dysfunction     History of nocturia     History of tobacco use     Hypercholesteremia     Hypertension        Past Surgical History:  Past Surgical History:   Procedure Laterality Date    HX HERNIA REPAIR      surgery as infant       Family History:  Family History   Problem Relation Age of Onset    Depression Mother    [place] Migraines Mother     Diabetes Mother     OSTEOARTHRITIS Mother     Alcohol abuse Father     Hypertension Father     High Cholesterol Father     OSTEOARTHRITIS Father     Alzheimer's Disease Maternal Grandmother     Eczema Maternal Grandmother        Social History:   Social History     Tobacco Use    Smoking status: Current Every Day Smoker     Packs/day: 0.50     Years: 25.00     Pack years: 12.50     Types: Cigarettes    Smokeless tobacco: Never Used   Substance Use Topics    Alcohol use: Yes     Alcohol/week: 1.0 standard drink     Types: 1 Cans of beer per week     Comment: rarely    Drug use: Yes     Types: Heroin       Allergies:  No Known Allergies    PMH, PSH, family history, social history, allergies reviewed with the patient with significant items noted above. Review of Systems   Review of Systems   Constitutional: Positive for chills. Negative for fever. HENT: Positive for congestion and sinus pain.  Negative for sore throat. Eyes: Negative for visual disturbance. Negative recent vision problems   Respiratory: Positive for cough. Negative for shortness of breath. Cardiovascular: Positive for chest pain. Gastrointestinal: Negative for abdominal pain, diarrhea, nausea and vomiting. Genitourinary: Negative for difficulty urinating. Musculoskeletal: Negative for myalgias. Skin: Negative for rash. Neurological: Negative for headaches. Negative altered level of consciousness   All other systems reviewed and are negative. Physical Exam     Vitals:    01/10/22 1041   BP: 135/86   Pulse: 74   Resp: 18   Temp: 99 °F (37.2 °C)   SpO2: 99%   Weight: 99.8 kg (220 lb)   Height: 6' 1\" (1.854 m)       Physical Exam  Vitals and nursing note reviewed. Constitutional:       General: He is not in acute distress. Appearance: Normal appearance. He is not ill-appearing or toxic-appearing. HENT:      Head: Normocephalic and atraumatic. Nose: Nose normal. No congestion or rhinorrhea. Mouth/Throat:      Mouth: Mucous membranes are moist.   Eyes:      General: No scleral icterus. Conjunctiva/sclera: Conjunctivae normal.   Cardiovascular:      Rate and Rhythm: Normal rate and regular rhythm. Pulmonary:      Effort: Pulmonary effort is normal. No respiratory distress. Abdominal:      General: There is no distension. Palpations: Abdomen is soft. Tenderness: There is no abdominal tenderness. There is no guarding or rebound. Musculoskeletal:         General: No deformity. Normal range of motion. Cervical back: Normal range of motion and neck supple. Skin:     General: Skin is warm and dry. Findings: No rash. Neurological:      General: No focal deficit present. Mental Status: He is alert and oriented to person, place, and time. Mental status is at baseline.    Psychiatric:         Mood and Affect: Mood normal.         Behavior: Behavior normal.         Diagnostic Study Results     Labs -     Recent Results (from the past 12 hour(s))   EKG, 12 LEAD, INITIAL    Collection Time: 01/10/22 10:36 AM   Result Value Ref Range    Ventricular Rate 76 BPM    Atrial Rate 76 BPM    P-R Interval 172 ms    QRS Duration 86 ms    Q-T Interval 536 ms    QTC Calculation (Bezet) 603 ms    Calculated P Axis 55 degrees    Calculated R Axis -7 degrees    Calculated T Axis 83 degrees    Diagnosis        Critical Test Result: Long QTc  Normal sinus rhythm  Normal ECG  When compared with ECG of 12-AUG-2021 17:51,  Questionable change in QRS axis  T wave inversion no longer evident in Inferior leads  T wave inversion no longer evident in Anterolateral leads  QT has lengthened     CBC WITH AUTOMATED DIFF    Collection Time: 01/10/22 12:00 PM   Result Value Ref Range    WBC 10.0 4.6 - 13.2 K/uL    RBC 4.19 (L) 4.35 - 5.65 M/uL    HGB 11.5 (L) 13.0 - 16.0 g/dL    HCT 37.1 36.0 - 48.0 %    MCV 88.5 78.0 - 100.0 FL    MCH 27.4 24.0 - 34.0 PG    MCHC 31.0 31.0 - 37.0 g/dL    RDW 14.0 11.6 - 14.5 %    PLATELET 988 (H) 652 - 420 K/uL    MPV 9.3 9.2 - 11.8 FL    NRBC 0.0 0  WBC    ABSOLUTE NRBC 0.00 0.00 - 0.01 K/uL    NEUTROPHILS 71 40 - 73 %    LYMPHOCYTES 15 (L) 21 - 52 %    MONOCYTES 8 3 - 10 %    EOSINOPHILS 5 0 - 5 %    BASOPHILS 0 0 - 2 %    IMMATURE GRANULOCYTES 1 (H) 0.0 - 0.5 %    ABS. NEUTROPHILS 7.1 1.8 - 8.0 K/UL    ABS. LYMPHOCYTES 1.5 0.9 - 3.6 K/UL    ABS. MONOCYTES 0.8 0.05 - 1.2 K/UL    ABS. EOSINOPHILS 0.5 (H) 0.0 - 0.4 K/UL    ABS. BASOPHILS 0.0 0.0 - 0.1 K/UL    ABS. IMM.  GRANS. 0.1 (H) 0.00 - 0.04 K/UL    DF AUTOMATED     METABOLIC PANEL, COMPREHENSIVE    Collection Time: 01/10/22 12:00 PM   Result Value Ref Range    Sodium 138 136 - 145 mmol/L    Potassium 4.0 3.5 - 5.5 mmol/L    Chloride 103 100 - 111 mmol/L    CO2 31 21 - 32 mmol/L    Anion gap 4 3.0 - 18 mmol/L    Glucose 98 74 - 99 mg/dL    BUN 7 7.0 - 18 MG/DL    Creatinine 0.83 0.6 - 1.3 MG/DL    BUN/Creatinine ratio 8 (L) 12 - 20      GFR est AA >60 >60 ml/min/1.73m2    GFR est non-AA >60 >60 ml/min/1.73m2    Calcium 8.9 8.5 - 10.1 MG/DL    Bilirubin, total 0.4 0.2 - 1.0 MG/DL    ALT (SGPT) 21 16 - 61 U/L    AST (SGOT) 32 10 - 38 U/L    Alk. phosphatase 104 45 - 117 U/L    Protein, total 6.3 (L) 6.4 - 8.2 g/dL    Albumin 3.1 (L) 3.4 - 5.0 g/dL    Globulin 3.2 2.0 - 4.0 g/dL    A-G Ratio 1.0 0.8 - 1.7     MAGNESIUM    Collection Time: 01/10/22 12:00 PM   Result Value Ref Range    Magnesium 2.6 1.6 - 2.6 mg/dL   TROPONIN-HIGH SENSITIVITY    Collection Time: 01/10/22 12:00 PM   Result Value Ref Range    Troponin-High Sensitivity 22 0 - 78 ng/L      Labs Reviewed   CBC WITH AUTOMATED DIFF - Abnormal; Notable for the following components:       Result Value    RBC 4.19 (*)     HGB 11.5 (*)     PLATELET 320 (*)     LYMPHOCYTES 15 (*)     IMMATURE GRANULOCYTES 1 (*)     ABS. EOSINOPHILS 0.5 (*)     ABS. IMM. GRANS. 0.1 (*)     All other components within normal limits   METABOLIC PANEL, COMPREHENSIVE - Abnormal; Notable for the following components:    BUN/Creatinine ratio 8 (*)     Protein, total 6.3 (*)     Albumin 3.1 (*)     All other components within normal limits   MAGNESIUM   TROPONIN-HIGH SENSITIVITY   SARS-COV-2       Radiologic Studies -   XR CHEST PA LAT   Final Result   Anterior right upper lobe opacity, most likely pneumonia, recommend follow-up   after treatment to ensure its resolution. CT Results  (Last 48 hours)    None        CXR Results  (Last 48 hours)               01/10/22 1203  XR CHEST PA LAT Final result    Impression:  Anterior right upper lobe opacity, most likely pneumonia, recommend follow-up   after treatment to ensure its resolution. Narrative:  EXAM:  XR CHEST PA LAT       INDICATION:   chest pain       COMPARISON: 8/12/2021. FINDINGS:   The cardiac and mediastinal silhouette are within normal limits. Pulmonary   vasculature is within normal limits. No pneumothorax or pleural effusions. Right   midlung/anterior upper lobe new opacities. No acute osseous abnormality. The laboratory results, imaging results, and other diagnostic exams were reviewed in the EMR. Medical Decision Making   I am the first provider for this patient. I reviewed the vital signs, available nursing notes, past medical history, past surgical history, family history and social history. Vital Signs-Reviewed the patient's vital signs. ED EKG interpretation:  Rhythm: normal sinus rhythm; and regular . Rate (approx.): 76; Axis: left axis deviation; P wave: normal; QRS interval: normal ; ST/T wave: normal; Other findings: Prolonged QTc. This EKG was interpreted by Tomeka Del Rio D.O. Records Reviewed: Personally, on initial evaluation    MDM:   Freedom Mccarty presents with complaint of chest pain, dentalgia, and nasal congestion. DDX includes but is not limited to: Pneumonia, ACS, polysubstance abuse    Patient verbal appearing, no acute distress, vital signs grossly within normal limits. Patient's EKG shows prolonged QTC however he is not taking any medication currently that would prolong his QTC and is otherwise stable. Will obtain lab work and imaging for further evaluation of patients complaint. Will continue to monitor and evaluate patient while in the ED.       Orders as below:  Orders Placed This Encounter    XR CHEST PA LAT    CBC WITH AUTOMATED DIFF    METABOLIC PANEL, COMPREHENSIVE    MAGNESIUM    TROPONIN-HIGH SENSITIVITY    SARS-COV-2    STEMI PANEL TRACKING (DO NOT DESELECT)    WEIGH PATIENT    PULSE OXIMETRY CONTINUOUS (If clinically indicated)    OXYGEN CANNULA Liters per minute: 2; Indications for O2 therapy: CHEST PAIN CONTINUOUS STAT    EKG, 12 LEAD, INITIAL    EKG, 12 LEAD, INITIAL    SALINE LOCK IV ONE TIME STAT    amoxicillin-clavulanate (AUGMENTIN) 875-125 mg per tablet 1 Tablet    acetaminophen (TYLENOL) tablet 1,000 mg    lidocaine (XYLOCAINE) 2 % viscous solution 15 mL    amoxicillin-clavulanate (Augmentin) 875-125 mg per tablet    chlorhexidine (Peridex) 0.12 % solution    aluminum-magnesium hydroxide 200-200 mg/5 mL susp 5 mL, diphenhydrAMINE 12.5 mg/5 mL liqd 12.5 mg, lidocaine 2 % soln 5 mL        ED Course:   ED Course as of 01/10/22 1443   Mon Raz 10, 2022   1342 Patient's lab work grossly within normal limits. Hemoglobin 11.5 but around his baseline. Chest x-ray shows Right upper lobe opacity most likely pneumonia. Will begin treatment while here in the emergency department with Augmentin and provide patient prescription for further treatment of his symptoms. Will discharge patient home at this time. Will provide patient with return precautions and follow-up recommendations. Patient verbalized understanding and is without any further questions. [DV]      ED Course User Index  [DV] Whitney Craft DO           Procedures:  Procedures        Diagnosis and Disposition     CLINICAL IMPRESSION:  1. Pneumonia of right upper lobe due to infectious organism    2. Dentalgia    3. Dental caries    4. Nasal congestion    5. Sinus pressure    6. Encounter for laboratory testing for COVID-19 virus    7. Polysubstance abuse ([facility] [address]    8. Anemia, unspecified type      Current Discharge Medication List      START taking these medications    Details   amoxicillin-clavulanate (Augmentin) 875-125 mg per tablet Take 1 Tablet by mouth two (2) times a day for 7 days. Qty: 14 Tablet, Refills: 0  Start date: 1/10/2022, End date: 1/17/2022      chlorhexidine (Peridex) 0.12 % solution 15 mL by Swish and Spit route every twelve (12) hours for 14 days. Qty: 420 mL, Refills: 0  Start date: 1/10/2022, End date: 1/24/2022      aluminum-magnesium hydroxide 200-200 mg/5 mL susp 5 mL, diphenhydrAMINE 12.5 mg/5 mL liqd 12.5 mg, lidocaine 2 % soln 5 mL 5 mL by Swish and Spit route two (2) times a day.  Magic mouth wash   Maalox  Lidocaine 2% viscous   Diphenhydramine oral solution Pharmacy to mix equal portions of ingredients to a total volume as indicated in the dispense amount. Qty: 200 mL, Refills: 0  Start date: 1/10/2022             Disposition: Home    Patient condition at time of disposition: Stable    DISCHARGE NOTE:   Pt has been reexamined. Patient has no new complaints, changes, or physical findings. Care plan outlined and precautions discussed. Results were reviewed with the patient. All medications were reviewed with the patient. All of pt's questions and concerns were addressed. Alarm symptoms and return precautions associated with chief complaint and evaluation were reviewed with the patient in detail. The patient demonstrated adequate understanding. Patient was instructed to follow up with PCP, as well as strict return precautions to the ED upon further deterioration. Patient is ready to go home. The patient is happy with this plan          Dragon Disclaimer     Please note that this dictation was completed with [email or web address], the computer voice recognition software. Quite often unanticipated grammatical, syntax, homophones, and other interpretive errors are inadvertently transcribed by the computer software. Please disregard these errors. Please excuse any errors that have escaped final proofreading. Brady HOFFMAN

## 2022-01-10 NOTE — Clinical Note
[facility]  [facility] EMERGENCY DEPT  [address] [address] [phone]    Work/School Note    Date: 1/10/2022    To Whom It May concern:    Dioni Durbin was seen and treated today in the emergency room by the following provider(s):  Attending Provider: Jovan Donovan is excused from work/school on 1/10/2022 through 1/12/2022. He is medically clear to return to work/school on 1/13/2022.          Sincerely,          Say Doyle, DO

## 2022-01-12 LAB — SARS-COV-2, NAA: DETECTED

## 2022-02-05 ENCOUNTER — HOSPITAL ENCOUNTER (EMERGENCY)
Age: 47
Discharge: HOME | End: 2022-02-05
Attending: EMERGENCY MEDICINE
Payer: MEDICAID

## 2022-02-05 ENCOUNTER — APPOINTMENT (OUTPATIENT)
Dept: GENERAL RADIOLOGY | Age: 47
End: 2022-02-05
Attending: EMERGENCY MEDICINE
Payer: MEDICAID

## 2022-02-05 VITALS
HEART RATE: 95 BPM | HEIGHT: 73 IN | TEMPERATURE: 98 F | SYSTOLIC BLOOD PRESSURE: 142 MMHG | RESPIRATION RATE: 20 BRPM | WEIGHT: 225 LBS | BODY MASS INDEX: 29.82 KG/M2 | OXYGEN SATURATION: 98 % | DIASTOLIC BLOOD PRESSURE: 80 MMHG

## 2022-02-05 DIAGNOSIS — J18.9 PNEUMONIA OF RIGHT LUNG DUE TO INFECTIOUS ORGANISM, UNSPECIFIED PART OF LUNG: ICD-10-CM

## 2022-02-05 DIAGNOSIS — J01.00 ACUTE MAXILLARY SINUSITIS, RECURRENCE NOT SPECIFIED: ICD-10-CM

## 2022-02-05 DIAGNOSIS — R07.9 CHEST PAIN, UNSPECIFIED TYPE: Primary | ICD-10-CM

## 2022-02-05 LAB
ALBUMIN SERPL-MCNC: 3.3 G/DL (ref 3.4–5)
ALBUMIN/GLOB SERPL: 1.1 {RATIO} (ref 0.8–1.7)
ALP SERPL-CCNC: 86 U/L (ref 45–117)
ALT SERPL-CCNC: 23 U/L (ref 16–61)
AMPHET UR QL SCN: NEGATIVE
ANION GAP SERPL CALC-SCNC: 4 MMOL/L (ref 3–18)
APPEARANCE UR: CLEAR
AST SERPL-CCNC: 27 U/L (ref 10–38)
BARBITURATES UR QL SCN: NEGATIVE
BASOPHILS # BLD: 0.1 K/UL (ref 0–0.1)
BASOPHILS NFR BLD: 1 % (ref 0–2)
BENZODIAZ UR QL: NEGATIVE
BILIRUB SERPL-MCNC: 0.2 MG/DL (ref 0.2–1)
BILIRUB UR QL: NEGATIVE
BNP SERPL-MCNC: 47 PG/ML (ref 0–450)
BUN SERPL-MCNC: 6 MG/DL (ref 7–18)
BUN/CREAT SERPL: 7 (ref 12–20)
CALCIUM SERPL-MCNC: 8.6 MG/DL (ref 8.5–10.1)
CANNABINOIDS UR QL SCN: NEGATIVE
CHLORIDE SERPL-SCNC: 105 MMOL/L (ref 100–111)
CO2 SERPL-SCNC: 30 MMOL/L (ref 21–32)
COCAINE UR QL SCN: POSITIVE
COLOR UR: YELLOW
CREAT SERPL-MCNC: 0.9 MG/DL (ref 0.6–1.3)
DIFFERENTIAL METHOD BLD: ABNORMAL
EOSINOPHIL # BLD: 0.6 K/UL (ref 0–0.4)
EOSINOPHIL NFR BLD: 5 % (ref 0–5)
ERYTHROCYTE [DISTWIDTH] IN BLOOD BY AUTOMATED COUNT: 14.3 % (ref 11.6–14.5)
GLOBULIN SER CALC-MCNC: 3.1 G/DL (ref 2–4)
GLUCOSE SERPL-MCNC: 99 MG/DL (ref 74–99)
GLUCOSE UR STRIP.AUTO-MCNC: NEGATIVE MG/DL
HCT VFR BLD AUTO: 39.7 % (ref 36–48)
HDSCOM,HDSCOM: ABNORMAL
HGB BLD-MCNC: 12.3 G/DL (ref 13–16)
HGB UR QL STRIP: NEGATIVE
IMM GRANULOCYTES # BLD AUTO: 0 K/UL (ref 0–0.04)
IMM GRANULOCYTES NFR BLD AUTO: 0 % (ref 0–0.5)
KETONES UR QL STRIP.AUTO: NEGATIVE MG/DL
LEUKOCYTE ESTERASE UR QL STRIP.AUTO: NEGATIVE
LYMPHOCYTES # BLD: 1.7 K/UL (ref 0.9–3.6)
LYMPHOCYTES NFR BLD: 14 % (ref 21–52)
MCH RBC QN AUTO: 26.9 PG (ref 24–34)
MCHC RBC AUTO-ENTMCNC: 31 G/DL (ref 31–37)
MCV RBC AUTO: 86.9 FL (ref 78–100)
METHADONE UR QL: POSITIVE
MONOCYTES # BLD: 0.9 K/UL (ref 0.05–1.2)
MONOCYTES NFR BLD: 7 % (ref 3–10)
NEUTS SEG # BLD: 8.9 K/UL (ref 1.8–8)
NEUTS SEG NFR BLD: 73 % (ref 40–73)
NITRITE UR QL STRIP.AUTO: NEGATIVE
NRBC # BLD: 0 K/UL (ref 0–0.01)
NRBC BLD-RTO: 0 PER 100 WBC
OPIATES UR QL: POSITIVE
PCP UR QL: NEGATIVE
PH UR STRIP: 6.5 [PH] (ref 5–8)
PLATELET # BLD AUTO: 420 K/UL (ref 135–420)
PMV BLD AUTO: 9.2 FL (ref 9.2–11.8)
POTASSIUM SERPL-SCNC: 4.1 MMOL/L (ref 3.5–5.5)
PROT SERPL-MCNC: 6.4 G/DL (ref 6.4–8.2)
PROT UR STRIP-MCNC: NEGATIVE MG/DL
RBC # BLD AUTO: 4.57 M/UL (ref 4.35–5.65)
SODIUM SERPL-SCNC: 139 MMOL/L (ref 136–145)
SP GR UR REFRACTOMETRY: 1 (ref 1–1.03)
TROPONIN-HIGH SENSITIVITY: 5 NG/L (ref 0–78)
UROBILINOGEN UR QL STRIP.AUTO: 0.2 EU/DL (ref 0.2–1)
WBC # BLD AUTO: 12.2 K/UL (ref 4.6–13.2)

## 2022-02-05 PROCEDURE — 80053 COMPREHEN METABOLIC PANEL: CPT

## 2022-02-05 PROCEDURE — 80307 DRUG TEST PRSMV CHEM ANLYZR: CPT

## 2022-02-05 PROCEDURE — 93005 ELECTROCARDIOGRAM TRACING: CPT

## 2022-02-05 PROCEDURE — 85025 COMPLETE CBC W/AUTO DIFF WBC: CPT

## 2022-02-05 PROCEDURE — 81003 URINALYSIS AUTO W/O SCOPE: CPT

## 2022-02-05 PROCEDURE — 84484 ASSAY OF TROPONIN QUANT: CPT

## 2022-02-05 PROCEDURE — 71045 X-RAY EXAM CHEST 1 VIEW: CPT

## 2022-02-05 PROCEDURE — 99283 EMERGENCY DEPT VISIT LOW MDM: CPT

## 2022-02-05 PROCEDURE — 83880 ASSAY OF NATRIURETIC PEPTIDE: CPT

## 2022-02-05 RX ORDER — OXYMETAZOLINE HCL 0.05 %
2 SPRAY, NON-AEROSOL (ML) NASAL 2 TIMES DAILY
Qty: 1 EACH | Refills: 0 | Status: SHIPPED | OUTPATIENT
Start: 2022-02-05 | End: 2022-02-08

## 2022-02-05 RX ORDER — LIDOCAINE 50 MG/G
PATCH TOPICAL
Qty: 30 EACH | Refills: 0 | Status: SHIPPED | OUTPATIENT
Start: 2022-02-05

## 2022-02-05 RX ORDER — METHOCARBAMOL 500 MG/1
500 TABLET, FILM COATED ORAL 3 TIMES DAILY
Qty: 15 TABLET | Refills: 0 | Status: SHIPPED | OUTPATIENT
Start: 2022-02-05

## 2022-02-05 NOTE — Clinical Note
[facility]  [address] [address]  [phone]    Work/School Note    Date: 2/5/2022    To Whom It May concern:    Matthew Cotto was seen and treated today in the emergency room by the following provider(s):  Attending Provider: Arianna De Luna MD  Physician Assistant: AMBER George.      Matthew Cotto is excused from work/school on 2/5/2022 through 2/7/2022. He is medically clear to return to work/school on 2/8/2022.          Sincerely,          AMBER Lozada

## 2022-02-05 NOTE — Clinical Note
[address] [place] [address] [facility] EMERGENCY DEPT  [address] [address] [phone]    Work/School Note    Date: 2/5/2022    To Whom It May concern:    Dioni Durbin was seen and treated today in the emergency room by the following provider(s):  Attending Provider: Daiana Gallegos MD  Physician Assistant: AMBER Joseph.      Dioni Durbin is excused from work/school on 2/5/2022 through 2/7/2022. He is medically clear to return to work/school on 2/8/2022.          Sincerely,          AMBER Stevens

## 2022-02-06 LAB
ATRIAL RATE: 96 BPM
CALCULATED P AXIS, ECG09: 41 DEGREES
CALCULATED R AXIS, ECG10: 18 DEGREES
CALCULATED T AXIS, ECG11: 80 DEGREES
DIAGNOSIS, 93000: NORMAL
P-R INTERVAL, ECG05: 172 MS
Q-T INTERVAL, ECG07: 420 MS
QRS DURATION, ECG06: 86 MS
QTC CALCULATION (BEZET), ECG08: 530 MS
VENTRICULAR RATE, ECG03: 96 BPM

## 2022-02-06 NOTE — ED PROVIDER NOTES
EMERGENCY DEPARTMENT HISTORY AND PHYSICAL EXAM      Date: 2/5/2022  Patient Name: Angi Ellsworth    History of Presenting Illness     Chief Complaint   Patient presents with    Chest Pain       History Provided By: Patient    HPI: Angi Ellsworth, 51 y.o. male PMHx significant for hypertension, ADD, previous heroin abuse currently taking methadone, presents ambulatory to the ED. Pt reports intermittent aching left sided chest pain that is worse with sneezing, blowing nose, left arm movement and coughing. Patient reports he was recently diagnosed with pneumonia five days ago, and he has been taking doxycycline as prescribed. Patient reports he still has a few days left taking antibiotics. Patient reports he told his primary care doctor about this pain and she told him it was likely musculoskeletal chest pain. He reports he was concerned so he came to ED. Denies SOB, dizziness. Denies cardiac hx. Denies history of blood clot. Denies lower leg pain or swelling. Patient also reports sinus congestion and sinus pressure likely consistent with a sinus infection. Patient reports multiple history of sinus infections. History of previous cocaine abuse and reports deviated septum. Patient also reports he was recently diagnosed with Covid 1 month ago. There are no other complaints, changes, or physical findings at this time. PCP: Purnima Santos NP    No current facility-administered medications on file prior to encounter. Current Outpatient Medications on File Prior to Encounter   Medication Sig Dispense Refill    aluminum-magnesium hydroxide 200-200 mg/5 mL susp 5 mL, diphenhydrAMINE 12.5 mg/5 mL liqd 12.5 mg, lidocaine 2 % soln 5 mL 5 mL by Swish and Spit route two (2) times a day. Magic mouth wash   Maalox  Lidocaine 2% viscous   Diphenhydramine oral solution     Pharmacy to mix equal portions of ingredients to a total volume as indicated in the dispense amount.  200 mL 0    fluticasone propionate (FLONASE) 50 mcg/actuation nasal spray 2 Sprays by Both Nostrils route daily. 16 g 0    albuterol (PROVENTIL HFA, VENTOLIN HFA, PROAIR HFA) 90 mcg/actuation inhaler Take 2 Puffs by inhalation every four (4) hours as needed for Wheezing. 1 Inhaler 0    amLODIPine (NORVASC) 5 mg tablet Take 5 mg by mouth daily.  albuterol (PROVENTIL HFA, VENTOLIN HFA, PROAIR HFA) 90 mcg/actuation inhaler Take 2 Puffs by inhalation every four (4) hours as needed for Wheezing. 1 Inhaler 0    tadalafil (CIALIS) 20 mg tablet Take 1 Tab by mouth as needed (ED). Indications: Inability to have an Erection 30 Tab 3    METHADONE HCL (METHADONE PO) Take 85 mg by mouth daily. Past History     Past Medical History:  Past Medical History:   Diagnosis Date    ADD (attention deficit disorder)     Depression     Heroin abuse ([address]     Started age 14 years, on methadone clinic     History of erectile dysfunction     History of nocturia     History of tobacco use     Hypercholesteremia     Hypertension        Past Surgical History:  Past Surgical History:   Procedure Laterality Date    HX HERNIA REPAIR      surgery as infant       Family History:  Family History   Problem Relation Age of Onset    Depression Mother    [facility] Migraines Mother     Diabetes Mother     OSTEOARTHRITIS Mother     Alcohol abuse Father     Hypertension Father     High Cholesterol Father     OSTEOARTHRITIS Father     Alzheimer's Disease Maternal Grandmother     Eczema Maternal Grandmother        Social History:  Social History     Tobacco Use    Smoking status: Current Every Day Smoker     Packs/day: 0.50     Years: 25.00     Pack years: 12.50     Types: Cigarettes    Smokeless tobacco: Never Used   Substance Use Topics    Alcohol use:  Yes     Alcohol/week: 1.0 standard drink     Types: 1 Cans of beer per week     Comment: rarely    Drug use: Yes     Types: Heroin       Allergies:  No Known Allergies      Review of Systems   Review of Systems Constitutional: Negative for chills and fever. HENT: Positive for congestion and sinus pressure. Negative for facial swelling. Eyes: Negative for photophobia and visual disturbance. Respiratory: Positive for cough. Negative for shortness of breath. Cardiovascular: Positive for chest pain. Gastrointestinal: Negative for abdominal pain, nausea and vomiting. Genitourinary: Negative for flank pain. Musculoskeletal: Positive for arthralgias. Skin: Negative for color change, pallor, rash and wound. Neurological: Negative for dizziness, weakness, light-headedness and headaches. All other systems reviewed and are negative. Physical Exam   Physical Exam  Vitals and nursing note reviewed. Constitutional:       General: He is not in acute distress. Appearance: He is well-developed. Comments: Pt in NAD   HENT:      Head: Normocephalic and atraumatic. Eyes:      Conjunctiva/sclera: Conjunctivae normal.   Cardiovascular:      Rate and Rhythm: Normal rate and regular rhythm. Heart sounds: Normal heart sounds. Pulmonary:      Effort: Pulmonary effort is normal. No respiratory distress. Breath sounds: Normal breath sounds. Comments: Lungs CTA  Not working to breathe  Chest:      Comments: Pain reproducible on exam  Abdominal:      General: Bowel sounds are normal.      Palpations: Abdomen is soft. Tenderness: There is no abdominal tenderness. Musculoskeletal:         General: Normal range of motion. Skin:     General: Skin is warm. Findings: No rash. Neurological:      Mental Status: He is alert and oriented to person, place, and time. Cranial Nerves: No cranial nerve deficit.    Psychiatric:         Behavior: Behavior normal.         Diagnostic Study Results     Labs -     Recent Results (from the past 12 hour(s))   EKG, 12 LEAD, INITIAL    Collection Time: 02/05/22 10:19 PM   Result Value Ref Range    Ventricular Rate 96 BPM    Atrial Rate 96 BPM    P-R Interval 172 ms    QRS Duration 86 ms    Q-T Interval 420 ms    QTC Calculation (Bezet) 530 ms    Calculated P Axis 41 degrees    Calculated R Axis 18 degrees    Calculated T Axis 80 degrees    Diagnosis       Normal sinus rhythm  Nonspecific T wave abnormality  Abnormal ECG  When compared with ECG of 10-RENETTA-2022 10:36,  Nonspecific T wave abnormality, worse in Lateral leads  QT has shortened     CBC WITH AUTOMATED DIFF    Collection Time: 02/05/22 10:25 PM   Result Value Ref Range    WBC 12.2 4.6 - 13.2 K/uL    RBC 4.57 4.35 - 5.65 M/uL    HGB 12.3 (L) 13.0 - 16.0 g/dL    HCT 39.7 36.0 - 48.0 %    MCV 86.9 78.0 - 100.0 FL    MCH 26.9 24.0 - 34.0 PG    MCHC 31.0 31.0 - 37.0 g/dL    RDW 14.3 11.6 - 14.5 %    PLATELET 977 897 - 654 K/uL    MPV 9.2 9.2 - 11.8 FL    NRBC 0.0 0  WBC    ABSOLUTE NRBC 0.00 0.00 - 0.01 K/uL    NEUTROPHILS 73 40 - 73 %    LYMPHOCYTES 14 (L) 21 - 52 %    MONOCYTES 7 3 - 10 %    EOSINOPHILS 5 0 - 5 %    BASOPHILS 1 0 - 2 %    IMMATURE GRANULOCYTES 0 0.0 - 0.5 %    ABS. NEUTROPHILS 8.9 (H) 1.8 - 8.0 K/UL    ABS. LYMPHOCYTES 1.7 0.9 - 3.6 K/UL    ABS. MONOCYTES 0.9 0.05 - 1.2 K/UL    ABS. EOSINOPHILS 0.6 (H) 0.0 - 0.4 K/UL    ABS. BASOPHILS 0.1 0.0 - 0.1 K/UL    ABS. IMM. GRANS. 0.0 0.00 - 0.04 K/UL    DF AUTOMATED     METABOLIC PANEL, COMPREHENSIVE    Collection Time: 02/05/22 10:25 PM   Result Value Ref Range    Sodium 139 136 - 145 mmol/L    Potassium 4.1 3.5 - 5.5 mmol/L    Chloride 105 100 - 111 mmol/L    CO2 30 21 - 32 mmol/L    Anion gap 4 3.0 - 18 mmol/L    Glucose 99 74 - 99 mg/dL    BUN 6 (L) 7.0 - 18 MG/DL    Creatinine 0.90 0.6 - 1.3 MG/DL    BUN/Creatinine ratio 7 (L) 12 - 20      GFR est AA >60 >60 ml/min/1.73m2    GFR est non-AA >60 >60 ml/min/1.73m2    Calcium 8.6 8.5 - 10.1 MG/DL    Bilirubin, total 0.2 0.2 - 1.0 MG/DL    ALT (SGPT) 23 16 - 61 U/L    AST (SGOT) 27 10 - 38 U/L    Alk.  phosphatase 86 45 - 117 U/L    Protein, total 6.4 6.4 - 8.2 g/dL    Albumin 3.3 (L) 3.4 - 5.0 g/dL    Globulin 3.1 2.0 - 4.0 g/dL    A-G Ratio 1.1 0.8 - 1.7     NT-PRO BNP    Collection Time: 02/05/22 10:25 PM   Result Value Ref Range    NT pro-BNP 47 0 - 450 PG/ML   TROPONIN-HIGH SENSITIVITY    Collection Time: 02/05/22 10:25 PM   Result Value Ref Range    Troponin-High Sensitivity 5 0 - 78 ng/L   URINALYSIS W/ RFLX MICROSCOPIC    Collection Time: 02/05/22 10:25 PM   Result Value Ref Range    Color YELLOW      Appearance CLEAR      Specific gravity 1.005 1.005 - 1.030      pH (UA) 6.5 5.0 - 8.0      Protein Negative NEG mg/dL    Glucose Negative NEG mg/dL    Ketone Negative NEG mg/dL    Bilirubin Negative NEG      Blood Negative NEG      Urobilinogen 0.2 0.2 - 1.0 EU/dL    Nitrites Negative NEG      Leukocyte Esterase Negative NEG     DRUG SCREEN, URINE    Collection Time: 02/05/22 10:25 PM   Result Value Ref Range    BENZODIAZEPINES Negative NEG      BARBITURATES Negative NEG      THC (TH-CANNABINOL) Negative NEG      OPIATES Positive (A) NEG      PCP(PHENCYCLIDINE) Negative NEG      COCAINE Positive (A) NEG      AMPHETAMINES Negative NEG      METHADONE Positive (A) NEG      HDSCOM (NOTE)        Radiologic Studies -   XR CHEST PORT   Final Result      Right midlung density persists. May represent an unresolved pneumonia. This   needs to continue to be followed for clearing. Follow-up chest radiograph in 2   weeks recommended versus CT chest correlation. CT Results  (Last 48 hours)    None        CXR Results  (Last 48 hours)               02/05/22 2241  XR CHEST PORT Final result    Impression:      Right midlung density persists. May represent an unresolved pneumonia. This   needs to continue to be followed for clearing. Follow-up chest radiograph in 2   weeks recommended versus CT chest correlation. Narrative:  Portable Chest       CPT CODE: [id number]       HISTORY: Left-sided chest pain. FINDINGS:        Comparison 1/10/2022; 8/12/2021.        Area of reticulation in the right midlung stable. No pulmonary edema, effusion,   or pneumothorax. No acute consolidations. No acute osseous abnormality. Medical Decision Making   I am the first provider for this patient. I reviewed the vital signs, available nursing notes, past medical history, past surgical history, family history and social history. Vital Signs-Reviewed the patient's vital signs. Patient Vitals for the past 12 hrs:   Temp Pulse Resp BP SpO2   02/05/22 2230 98 °F (36.7 °C) 95 20 (!) 142/80 98 %         EKG interpretation: (Preliminary)  Rhythm: normal sinus rhythm; and regular . Rate (approx.): 96; Axis: normal; DC interval: normal; QRS interval: normal ; ST/T wave: non-specific changes; Other findings: normal.    No acute ischemic changes. Records Reviewed: Nursing Notes, Old Medical Records, Previous Radiology Studies and Previous Laboratory Studies    Provider Notes (Medical Decision Making):   DDx: ACS, PNA, Musculoskeletal chest pain, PE    52 yo M who presents with intermittent left sided chest pain made worse with coughing, sneezing and movement. Pain reproducible on exam.  EKG shows no acute ischemic changes with negative troponin. CXR shows PNA patient is currently taking doxycycline for this. Doxycycline also to treat patient sinus infection. Prescribed Afrin for symptoms. Suspect likely musculoskeletal chest pain but offered patient D-dimer to evaluate for PE due to his recent Covid infection. Patient opted for symptomatic treatment at this time and states he will return if symptoms worsen. At time of discharge, pt non-toxic appearing in NAD. Pt stable for prompt outpatient follow-up with PCP 1 to 2 days. Patient given strict instructions to return if symptoms worsen. ED Course:   Initial assessment performed. The patients presenting problems have been discussed, and they are in agreement with the care plan formulated and outlined with them.   I have encouraged them to ask questions as they arise throughout their visit. Offered pt ddimer with possible CTA to evaluate for PE. Pt declined. Patient states he would like to try symptomatic treatment for musculoskeletal chest pain and will return if sx worsen. Discussed with patient that he can return at any time. Disposition:  12:25 AM  Discussed lab and imaging results with pt along with dx and treatment plan. Discussed importance of PCP follow up. All questions answered. Pt voiced they understood. Return if sx worsen. PLAN:  1. Discharge Medication List as of 2/5/2022 11:43 PM      START taking these medications    Details   oxymetazoline (Afrin, oxymetazoline,) 0.05 % nasal spray 2 Sprays by Both Nostrils route two (2) times a day for 3 days. , Normal, Disp-1 Each, R-0      methocarbamoL (Robaxin) 500 mg tablet Take 1 Tablet by mouth three (3) times daily. , Normal, Disp-15 Tablet, R-0      lidocaine (Lidoderm) 5 % Apply patch to the affected area for 12 hours a day and remove for 12 hours a day., Normal, Disp-30 Each, R-0         CONTINUE these medications which have NOT CHANGED    Details   aluminum-magnesium hydroxide 200-200 mg/5 mL susp 5 mL, diphenhydrAMINE 12.5 mg/5 mL liqd 12.5 mg, lidocaine 2 % soln 5 mL 5 mL by Swish and Spit route two (2) times a day. Magic mouth wash   Maalox  Lidocaine 2% viscous   Diphenhydramine oral solution     Pharmacy to mix equal portions of ingredients to a total volume as indicated in the dispense amount. , Print, Disp-200 mL , R-0      fluticasone propionate (FLONASE) 50 mcg/actuation nasal spray 2 Sprays by Both Nostrils route daily. , Normal, Disp-16 g, R-0      !! albuterol (PROVENTIL HFA, VENTOLIN HFA, PROAIR HFA) 90 mcg/actuation inhaler Take 2 Puffs by inhalation every four (4) hours as needed for Wheezing., Normal, Disp-1 Inhaler, R-0      amLODIPine (NORVASC) 5 mg tablet Take 5 mg by mouth daily. , Historical Med      !! albuterol (PROVENTIL HFA, VENTOLIN HFA, PROAIR HFA) 90 mcg/actuation inhaler Take 2 Puffs by inhalation every four (4) hours as needed for Wheezing., Normal, Disp-1 Inhaler, R-0      tadalafil (CIALIS) 20 mg tablet Take 1 Tab by mouth as needed (ED). Indications: Inability to have an Erection, Normal, Disp-30 Tab, R-3      METHADONE HCL (METHADONE PO) Take 85 mg by mouth daily. , Historical Med       !! - Potential duplicate medications found. Please discuss with provider. 2.   Follow-up Information    None       Return to ED if worse     Diagnosis     Clinical Impression:   1. Chest pain, unspecified type    2. Pneumonia of right lung due to infectious organism, unspecified part of lung    3. Acute maxillary sinusitis, recurrence not specified        Attestations:    AMBER Zavala    Please note that this dictation was completed with [employer], the computer voice recognition software. Quite often unanticipated grammatical, syntax, homophones, and other interpretive errors are inadvertently transcribed by the computer software. Please disregard these errors. Please excuse any errors that have escaped final proofreading. Thank you.

## 2022-03-11 ENCOUNTER — APPOINTMENT (OUTPATIENT)
Dept: GENERAL RADIOLOGY | Age: 47
End: 2022-03-11
Attending: PHYSICIAN ASSISTANT
Payer: MEDICAID

## 2022-03-11 ENCOUNTER — HOSPITAL ENCOUNTER (EMERGENCY)
Age: 47
Discharge: HOME | End: 2022-03-11
Attending: EMERGENCY MEDICINE
Payer: MEDICAID

## 2022-03-11 VITALS
DIASTOLIC BLOOD PRESSURE: 95 MMHG | OXYGEN SATURATION: 99 % | HEIGHT: 73 IN | RESPIRATION RATE: 16 BRPM | BODY MASS INDEX: 29.16 KG/M2 | TEMPERATURE: 98.1 F | HEART RATE: 84 BPM | SYSTOLIC BLOOD PRESSURE: 153 MMHG | WEIGHT: 220 LBS

## 2022-03-11 DIAGNOSIS — F19.10 POLYSUBSTANCE ABUSE (HCC): ICD-10-CM

## 2022-03-11 DIAGNOSIS — R06.02 SOB (SHORTNESS OF BREATH): Primary | ICD-10-CM

## 2022-03-11 LAB
ALBUMIN SERPL-MCNC: 3.4 G/DL (ref 3.4–5)
ALBUMIN/GLOB SERPL: 0.8 {RATIO} (ref 0.8–1.7)
ALP SERPL-CCNC: 110 U/L (ref 45–117)
ALT SERPL-CCNC: 23 U/L (ref 16–61)
ANION GAP SERPL CALC-SCNC: 3 MMOL/L (ref 3–18)
AST SERPL-CCNC: 15 U/L (ref 10–38)
BASOPHILS # BLD: 0 K/UL (ref 0–0.1)
BASOPHILS NFR BLD: 0 % (ref 0–2)
BILIRUB SERPL-MCNC: 0.2 MG/DL (ref 0.2–1)
BUN SERPL-MCNC: 8 MG/DL (ref 7–18)
BUN/CREAT SERPL: 10 (ref 12–20)
CALCIUM SERPL-MCNC: 9.4 MG/DL (ref 8.5–10.1)
CHLORIDE SERPL-SCNC: 103 MMOL/L (ref 100–111)
CO2 SERPL-SCNC: 30 MMOL/L (ref 21–32)
CREAT SERPL-MCNC: 0.84 MG/DL (ref 0.6–1.3)
DIFFERENTIAL METHOD BLD: ABNORMAL
EOSINOPHIL # BLD: 0.6 K/UL (ref 0–0.4)
EOSINOPHIL NFR BLD: 6 % (ref 0–5)
ERYTHROCYTE [DISTWIDTH] IN BLOOD BY AUTOMATED COUNT: 14.4 % (ref 11.6–14.5)
GLOBULIN SER CALC-MCNC: 4.1 G/DL (ref 2–4)
GLUCOSE SERPL-MCNC: 122 MG/DL (ref 74–99)
HCT VFR BLD AUTO: 38.8 % (ref 36–48)
HGB BLD-MCNC: 12.1 G/DL (ref 13–16)
IMM GRANULOCYTES # BLD AUTO: 0 K/UL (ref 0–0.04)
IMM GRANULOCYTES NFR BLD AUTO: 0 % (ref 0–0.5)
LYMPHOCYTES # BLD: 1.1 K/UL (ref 0.9–3.6)
LYMPHOCYTES NFR BLD: 12 % (ref 21–52)
MCH RBC QN AUTO: 26.9 PG (ref 24–34)
MCHC RBC AUTO-ENTMCNC: 31.2 G/DL (ref 31–37)
MCV RBC AUTO: 86.2 FL (ref 78–100)
MONOCYTES # BLD: 0.6 K/UL (ref 0.05–1.2)
MONOCYTES NFR BLD: 7 % (ref 3–10)
NEUTS SEG # BLD: 7 K/UL (ref 1.8–8)
NEUTS SEG NFR BLD: 75 % (ref 40–73)
NRBC # BLD: 0 K/UL (ref 0–0.01)
NRBC BLD-RTO: 0 PER 100 WBC
PLATELET # BLD AUTO: 511 K/UL (ref 135–420)
PMV BLD AUTO: 9.1 FL (ref 9.2–11.8)
POTASSIUM SERPL-SCNC: 3.9 MMOL/L (ref 3.5–5.5)
PROT SERPL-MCNC: 7.5 G/DL (ref 6.4–8.2)
RBC # BLD AUTO: 4.5 M/UL (ref 4.35–5.65)
SODIUM SERPL-SCNC: 136 MMOL/L (ref 136–145)
TROPONIN-HIGH SENSITIVITY: <3 NG/L (ref 0–78)
WBC # BLD AUTO: 9.3 K/UL (ref 4.6–13.2)

## 2022-03-11 PROCEDURE — 85025 COMPLETE CBC W/AUTO DIFF WBC: CPT

## 2022-03-11 PROCEDURE — 71046 X-RAY EXAM CHEST 2 VIEWS: CPT

## 2022-03-11 PROCEDURE — 99285 EMERGENCY DEPT VISIT HI MDM: CPT

## 2022-03-11 PROCEDURE — 93005 ELECTROCARDIOGRAM TRACING: CPT

## 2022-03-11 PROCEDURE — 80053 COMPREHEN METABOLIC PANEL: CPT

## 2022-03-11 PROCEDURE — 84484 ASSAY OF TROPONIN QUANT: CPT

## 2022-03-11 NOTE — LETTER
NOTIFICATION RETURN TO WORK / SCHOOL    3/11/2022 3:42 PM    Mr. Riki Buckner  [address] [place] [phone]      To Whom It May Concern:    Riki Buckner was seen today in the [facility] EM[facility]GENCY DEPT. He will return to work/school on:03/12/2022    Riki Buckner may return to work/school with the following restrictions: {Work activity restriction:49975}. If there are questions or concerns please have the patient contact our office.         Sincerely,      Hamlet Ruelas LPN

## 2022-03-11 NOTE — ED PROVIDER NOTES
[facility] AT [facility] EMERGENCY DEPT    Date: 3/11/2022  Patient Name: Татьяна Majestic    History of Presenting Illness     Chief Complaint   Patient presents with    Drug Problem     51 y.o. male with a past medical history of Polysubstance abuse, Depression, Tobacco abuse, Hypertension, and Hypercholesterolemia presents the ED complaining of shortness of breath and heart racing onset at 8am this morning. He states he snorted heroin and cocaine at the same time and shortly after developed shortness of breath. States he already had Covid in September, has been vaccinated as well. States that he also has some nasal congestion for the past few days as well. Pt is on Methadone but says it is not enough to [facility] him. \"  Patient denies any fever, chills, chest pain, cough, body aches, abdominal pain, nausea or vomiting, hemoptysis, leg pain/swelling, other symptoms. Patient denies any other associated signs or symptoms. Patient denies any other complaints. Nursing notes regarding the HPI and triage nursing notes were reviewed. Prior medical records were reviewed. Current Outpatient Medications   Medication Sig Dispense Refill    methocarbamoL (Robaxin) 500 mg tablet Take 1 Tablet by mouth three (3) times daily. 15 Tablet 0    lidocaine (Lidoderm) 5 % Apply patch to the affected area for 12 hours a day and remove for 12 hours a day. 30 Each 0    aluminum-magnesium hydroxide 200-200 mg/5 mL susp 5 mL, diphenhydrAMINE 12.5 mg/5 mL liqd 12.5 mg, lidocaine 2 % soln 5 mL 5 mL by Swish and Spit route two (2) times a day. Magic mouth wash   Maalox  Lidocaine 2% viscous   Diphenhydramine oral solution     Pharmacy to mix equal portions of ingredients to a total volume as indicated in the dispense amount. 200 mL 0    fluticasone propionate (FLONASE) 50 mcg/actuation nasal spray 2 Sprays by Both Nostrils route daily.  16 g 0    albuterol (PROVENTIL HFA, VENTOLIN HFA, PROAIR HFA) 90 mcg/actuation inhaler Take 2 Puffs by inhalation every four (4) hours as needed for Wheezing. 1 Inhaler 0    amLODIPine (NORVASC) 5 mg tablet Take 5 mg by mouth daily.  albuterol (PROVENTIL HFA, VENTOLIN HFA, PROAIR HFA) 90 mcg/actuation inhaler Take 2 Puffs by inhalation every four (4) hours as needed for Wheezing. 1 Inhaler 0    tadalafil (CIALIS) 20 mg tablet Take 1 Tab by mouth as needed (ED). Indications: Inability to have an Erection 30 Tab 3    METHADONE HCL (METHADONE PO) Take 85 mg by mouth daily. Past History     Past Medical History:  Past Medical History:   Diagnosis Date    ADD (attention deficit disorder)     Depression     Heroin abuse ([address]     Started age 14 years, on methadone clinic     History of erectile dysfunction     History of nocturia     History of tobacco use     Hypercholesteremia     Hypertension        Past Surgical History:  Past Surgical History:   Procedure Laterality Date    HX HERNIA REPAIR      surgery as infant       Family History:  Family History   Problem Relation Age of Onset    Depression Mother    [facility] Migraines Mother     Diabetes Mother     OSTEOARTHRITIS Mother     Alcohol abuse Father     Hypertension Father     High Cholesterol Father     OSTEOARTHRITIS Father     Alzheimer's Disease Maternal Grandmother     Eczema Maternal Grandmother        Social History:  Social History     Tobacco Use    Smoking status: Current Every Day Smoker     Packs/day: 0.50     Years: 25.00     Pack years: 12.50     Types: Cigarettes    Smokeless tobacco: Never Used   Substance Use Topics    Alcohol use: Yes     Alcohol/week: 1.0 standard drink     Types: 1 Cans of beer per week     Comment: rarely    Drug use: Yes     Types: Heroin       Allergies:  No Known Allergies    Patient's primary care provider (as noted in EPIC):  Marleny Gregg NP    Review of Systems   Constitutional:  Denies malaise, fever, chills. Head:  Denies injury. Chest:  Denies injury.    Cardiac: Denies chest pain or palpitations. Respiratory: + shortness of breath. Denies cough. GI/ABD:  Denies injury, pain, distention, nausea, vomiting, diarrhea. :  Denies injury, pain, dysuria or urgency. Extremity/MS:  Denies swelling or pain. Neuro:  Denies headache, LOC, dizziness, neurologic symptoms/deficits/paresthesias. Skin: Denies injury, rash, itching or skin changes. All other systems negative as reviewed. Visit Vitals  BP (!) 153/95 (BP 1 Location: Left upper arm, BP Patient Position: At rest)   Pulse 84   Temp 98.1 °F (36.7 °C)   Resp 16   Ht 6' 1\" (1.854 m)   Wt 99.8 kg (220 lb)   SpO2 99%   BMI 29.03 kg/m²       PHYSICAL EXAM:    CONSTITUTIONAL:  Alert, in no apparent distress;  well developed;  well nourished. HEAD:  Normocephalic, atraumatic. EYES:  EOMI. Non-icteric sclera. Normal conjunctiva. ENTM:  Nose:  no rhinorrhea. Throat:  no erythema or exudate, mucous membranes moist.  NECK:  Supple  RESPIRATORY:  Chest clear, equal breath sounds, good air movement. Without wheezes, rhonchi or rales. CARDIOVASCULAR:  Regular rate and rhythm. No murmurs, rubs, or gallops. GI:  Normal bowel sounds, abdomen soft and non-tender. No rebound or guarding. BACK:  Non-tender. UPPER EXT:  Normal inspection. LOWER EXT:  No edema, no calf tenderness. Distal pulses intact. NEURO:  Moves all four extremities, and grossly normal motor exam.  SKIN:  No rashes;  Normal for age. PSYCH:  Alert and normal affect.     ED COURSE:      Recent Results (from the past 12 hour(s))   EKG, 12 LEAD, INITIAL    Collection Time: 03/11/22 10:48 AM   Result Value Ref Range    Ventricular Rate 74 BPM    Atrial Rate 74 BPM    P-R Interval 174 ms    QRS Duration 92 ms    Q-T Interval 382 ms    QTC Calculation (Bezet) 424 ms    Calculated P Axis 48 degrees    Calculated R Axis 0 degrees    Calculated T Axis 72 degrees    Diagnosis       Normal sinus rhythm  Minimal voltage criteria for LVH, may be normal variant ( Sokolow-Oshea )  Nonspecific T wave abnormality  Abnormal ECG  When compared with ECG of 05-FEB-2022 22:19,  QT has shortened     CBC WITH AUTOMATED DIFF    Collection Time: 03/11/22 12:34 PM   Result Value Ref Range    WBC 9.3 4.6 - 13.2 K/uL    RBC 4.50 4.35 - 5.65 M/uL    HGB 12.1 (L) 13.0 - 16.0 g/dL    HCT 38.8 36.0 - 48.0 %    MCV 86.2 78.0 - 100.0 FL    MCH 26.9 24.0 - 34.0 PG    MCHC 31.2 31.0 - 37.0 g/dL    RDW 14.4 11.6 - 14.5 %    PLATELET 238 (H) 882 - 420 K/uL    MPV 9.1 (L) 9.2 - 11.8 FL    NRBC 0.0 0  WBC    ABSOLUTE NRBC 0.00 0.00 - 0.01 K/uL    NEUTROPHILS 75 (H) 40 - 73 %    LYMPHOCYTES 12 (L) 21 - 52 %    MONOCYTES 7 3 - 10 %    EOSINOPHILS 6 (H) 0 - 5 %    BASOPHILS 0 0 - 2 %    IMMATURE GRANULOCYTES 0 0.0 - 0.5 %    ABS. NEUTROPHILS 7.0 1.8 - 8.0 K/UL    ABS. LYMPHOCYTES 1.1 0.9 - 3.6 K/UL    ABS. MONOCYTES 0.6 0.05 - 1.2 K/UL    ABS. EOSINOPHILS 0.6 (H) 0.0 - 0.4 K/UL    ABS. BASOPHILS 0.0 0.0 - 0.1 K/UL    ABS. IMM. GRANS. 0.0 0.00 - 0.04 K/UL    DF AUTOMATED     METABOLIC PANEL, COMPREHENSIVE    Collection Time: 03/11/22 12:34 PM   Result Value Ref Range    Sodium 136 136 - 145 mmol/L    Potassium 3.9 3.5 - 5.5 mmol/L    Chloride 103 100 - 111 mmol/L    CO2 30 21 - 32 mmol/L    Anion gap 3 3.0 - 18 mmol/L    Glucose 122 (H) 74 - 99 mg/dL    BUN 8 7.0 - 18 MG/DL    Creatinine 0.84 0.6 - 1.3 MG/DL    BUN/Creatinine ratio 10 (L) 12 - 20      GFR est AA >60 >60 ml/min/1.73m2    GFR est non-AA >60 >60 ml/min/1.73m2    Calcium 9.4 8.5 - 10.1 MG/DL    Bilirubin, total 0.2 0.2 - 1.0 MG/DL    ALT (SGPT) 23 16 - 61 U/L    AST (SGOT) 15 10 - 38 U/L    Alk.  phosphatase 110 45 - 117 U/L    Protein, total 7.5 6.4 - 8.2 g/dL    Albumin 3.4 3.4 - 5.0 g/dL    Globulin 4.1 (H) 2.0 - 4.0 g/dL    A-G Ratio 0.8 0.8 - 1.7     TROPONIN-HIGH SENSITIVITY    Collection Time: 03/11/22 12:34 PM   Result Value Ref Range    Troponin-High Sensitivity <3 0 - 78 ng/L      XR CHEST PA LAT    Result Date: 3/11/2022  EXAM: XR CHEST PA LAT INDICATION: 51 years Male. Shortness of breath. ADDITIONAL HISTORY: Recent cocaine use. TECHNIQUE: Frontal and lateral views of the chest. COMPARISON: 2/5/2022. FINDINGS: Rounded radiodensity overlying the the upper chest, likely related to the patient's clothing, partially obscuring assessment of the region. Lateral view is limited due to nonelevated arms. The cardiac silhouette is within normal limits in size. Pulmonary vasculature appears within normal limits. Tortuosity the thoracic aorta, with unchanged appearance. No confluent airspace opacity is appreciated. Previous described reticular opacities in the right midlung zone are no longer confidently appreciated. No evidence of pleural effusion or pneumothorax. No acute osseous abnormality appreciated. No radiographic evidence of acute cardiopulmonary process. Nithin Land MD R1 Radiology Resident All findings discussed with attending radiologist. I have personally reviewed all images and agree with the interpretation above. IMPRESSION AND MEDICAL DECISION MAKING:  Patient notes developing shortness of breath and rapid heart rate following snorting cocaine and heroin at 8 AM this morning. Patient states he no longer feels like his heart is racing, but has some shortness of breath. He is on methadone, but states that does not hold him over enough. Labs, EKG, and vitals unremarkable. Patient slept throughout his ED visit. Plan was to repeat a troponin. When I questioned nurse regarding this, they stated they could not find the patient. He was asleep and did not not respond to his name. I informed the patient we would be repeating his heart enzyme, he states that he would like to go home at this time. Patient states he is currently asymptomatic, otherwise feels great. Patient did not have any chest pain, troponins within normal limits.   Will discharge home and not repeat the troponin for this reason. Diagnosis:   1. SOB (shortness of breath)    2. Polysubstance abuse [facility])      Disposition: Discharge    Follow-up Information     Follow up With Specialties Details Why Contact Odalys Pimentel NP Nurse Practitioner In 3 days  [address]  [place] [address] [id number] [phone]      [facility] EMERGENCY DEPT Emergency Medicine  If symptoms worsen [address]  [phone]          Patient's Medications   Start Taking    No medications on file   Continue Taking    ALBUTEROL (PROVENTIL HFA, VENTOLIN HFA, PROAIR HFA) 90 MCG/ACTUATION INHALER    Take 2 Puffs by inhalation every four (4) hours as needed for Wheezing. ALBUTEROL (PROVENTIL HFA, VENTOLIN HFA, PROAIR HFA) 90 MCG/ACTUATION INHALER    Take 2 Puffs by inhalation every four (4) hours as needed for Wheezing. ALUMINUM-MAGNESIUM HYDROXIDE 200-200 MG/5 ML SUSP 5 ML, DIPHENHYDRAMINE 12.5 MG/5 ML LIQD 12.5 MG, LIDOCAINE 2 % SOLN 5 ML    5 mL by Swish and Spit route two (2) times a day. Magic mouth wash   Maalox  Lidocaine 2% viscous   Diphenhydramine oral solution     Pharmacy to mix equal portions of ingredients to a total volume as indicated in the dispense amount. AMLODIPINE (NORVASC) 5 MG TABLET    Take 5 mg by mouth daily. FLUTICASONE PROPIONATE (FLONASE) 50 MCG/ACTUATION NASAL SPRAY    2 Sprays by Both Nostrils route daily. LIDOCAINE (LIDODERM) 5 %    Apply patch to the affected area for 12 hours a day and remove for 12 hours a day. METHADONE HCL (METHADONE PO)    Take 85 mg by mouth daily. METHOCARBAMOL (ROBAXIN) 500 MG TABLET    Take 1 Tablet by mouth three (3) times daily. TADALAFIL (CIALIS) 20 MG TABLET    Take 1 Tab by mouth as needed (ED).  Indications: Inability to have an Erection   These Medications have changed    No medications on file   Stop Taking    No medications on file     AMBER Mcclelland

## 2022-03-11 NOTE — ED TRIAGE NOTES
Pt states \"I did some powder cocaine this morning, and now I feel miserable. My breathing is short, I feel like I'm having have heart palpitations. \" Pt also endorses using heroin and is on methadone.

## 2022-03-12 LAB
ATRIAL RATE: 74 BPM
CALCULATED P AXIS, ECG09: 48 DEGREES
CALCULATED R AXIS, ECG10: 0 DEGREES
CALCULATED T AXIS, ECG11: 72 DEGREES
DIAGNOSIS, 93000: NORMAL
P-R INTERVAL, ECG05: 174 MS
Q-T INTERVAL, ECG07: 382 MS
QRS DURATION, ECG06: 92 MS
QTC CALCULATION (BEZET), ECG08: 424 MS
VENTRICULAR RATE, ECG03: 74 BPM

## 2022-03-18 PROBLEM — I10 HTN, GOAL BELOW 140/90: Status: ACTIVE | Noted: 2017-08-15

## 2022-03-19 PROBLEM — Z28.21 REFUSED INFLUENZA VACCINE: Status: ACTIVE | Noted: 2017-10-10

## 2022-03-19 PROBLEM — N52.9 ERECTILE DYSFUNCTION: Status: ACTIVE | Noted: 2018-01-15

## 2022-03-19 PROBLEM — F17.210 CIGARETTE SMOKER: Status: ACTIVE | Noted: 2018-01-15

## 2022-08-07 ENCOUNTER — HOSPITAL ENCOUNTER (EMERGENCY)
Age: 47
Discharge: HOME HEALTH | End: 2022-08-07
Attending: EMERGENCY MEDICINE
Payer: MEDICAID

## 2022-08-07 ENCOUNTER — APPOINTMENT (OUTPATIENT)
Dept: GENERAL RADIOLOGY | Age: 47
End: 2022-08-07
Attending: EMERGENCY MEDICINE
Payer: MEDICAID

## 2022-08-07 VITALS
RESPIRATION RATE: 18 BRPM | SYSTOLIC BLOOD PRESSURE: 116 MMHG | TEMPERATURE: 97.2 F | OXYGEN SATURATION: 98 % | DIASTOLIC BLOOD PRESSURE: 72 MMHG | HEART RATE: 45 BPM

## 2022-08-07 DIAGNOSIS — R07.9 CHEST PAIN, UNSPECIFIED TYPE: ICD-10-CM

## 2022-08-07 DIAGNOSIS — F19.10 POLYSUBSTANCE ABUSE (HCC): Primary | ICD-10-CM

## 2022-08-07 LAB
AMPHET UR QL SCN: NEGATIVE
ANION GAP SERPL CALC-SCNC: 6 MMOL/L (ref 3–18)
BARBITURATES UR QL SCN: NEGATIVE
BASOPHILS # BLD: 0 K/UL (ref 0–0.1)
BASOPHILS NFR BLD: 0 % (ref 0–2)
BENZODIAZ UR QL: NEGATIVE
BUN SERPL-MCNC: 11 MG/DL (ref 7–18)
BUN/CREAT SERPL: 13 (ref 12–20)
CALCIUM SERPL-MCNC: 9 MG/DL (ref 8.5–10.1)
CANNABINOIDS UR QL SCN: NEGATIVE
CHLORIDE SERPL-SCNC: 102 MMOL/L (ref 100–111)
CO2 SERPL-SCNC: 30 MMOL/L (ref 21–32)
COCAINE UR QL SCN: POSITIVE
CREAT SERPL-MCNC: 0.88 MG/DL (ref 0.6–1.3)
DIFFERENTIAL METHOD BLD: ABNORMAL
EOSINOPHIL # BLD: 0.2 K/UL (ref 0–0.4)
EOSINOPHIL NFR BLD: 2 % (ref 0–5)
ERYTHROCYTE [DISTWIDTH] IN BLOOD BY AUTOMATED COUNT: 13.7 % (ref 11.6–14.5)
ETHANOL SERPL-MCNC: <3 MG/DL (ref 0–3)
GLUCOSE SERPL-MCNC: 118 MG/DL (ref 74–99)
HCT VFR BLD AUTO: 34.6 % (ref 36–48)
HDSCOM,HDSCOM: ABNORMAL
HGB BLD-MCNC: 11.3 G/DL (ref 13–16)
IMM GRANULOCYTES # BLD AUTO: 0 K/UL (ref 0–0.04)
IMM GRANULOCYTES NFR BLD AUTO: 0 % (ref 0–0.5)
LYMPHOCYTES # BLD: 1.5 K/UL (ref 0.9–3.6)
LYMPHOCYTES NFR BLD: 16 % (ref 21–52)
MCH RBC QN AUTO: 27.9 PG (ref 24–34)
MCHC RBC AUTO-ENTMCNC: 32.7 G/DL (ref 31–37)
MCV RBC AUTO: 85.4 FL (ref 78–100)
METHADONE UR QL: POSITIVE
MONOCYTES # BLD: 0.6 K/UL (ref 0.05–1.2)
MONOCYTES NFR BLD: 7 % (ref 3–10)
NEUTS SEG # BLD: 6.7 K/UL (ref 1.8–8)
NEUTS SEG NFR BLD: 74 % (ref 40–73)
NRBC # BLD: 0 K/UL (ref 0–0.01)
NRBC BLD-RTO: 0 PER 100 WBC
OPIATES UR QL: POSITIVE
PCP UR QL: NEGATIVE
PLATELET # BLD AUTO: 399 K/UL (ref 135–420)
PMV BLD AUTO: 9.2 FL (ref 9.2–11.8)
POTASSIUM SERPL-SCNC: 4.1 MMOL/L (ref 3.5–5.5)
RBC # BLD AUTO: 4.05 M/UL (ref 4.35–5.65)
SODIUM SERPL-SCNC: 138 MMOL/L (ref 136–145)
TROPONIN-HIGH SENSITIVITY: 6 NG/L (ref 0–78)
TROPONIN-HIGH SENSITIVITY: 6 NG/L (ref 0–78)
WBC # BLD AUTO: 9.1 K/UL (ref 4.6–13.2)

## 2022-08-07 PROCEDURE — 80048 BASIC METABOLIC PNL TOTAL CA: CPT

## 2022-08-07 PROCEDURE — 99285 EMERGENCY DEPT VISIT HI MDM: CPT

## 2022-08-07 PROCEDURE — 93005 ELECTROCARDIOGRAM TRACING: CPT

## 2022-08-07 PROCEDURE — 84484 ASSAY OF TROPONIN QUANT: CPT

## 2022-08-07 PROCEDURE — 80307 DRUG TEST PRSMV CHEM ANLYZR: CPT

## 2022-08-07 PROCEDURE — 85025 COMPLETE CBC W/AUTO DIFF WBC: CPT

## 2022-08-07 PROCEDURE — 71045 X-RAY EXAM CHEST 1 VIEW: CPT

## 2022-08-07 PROCEDURE — 82077 ASSAY SPEC XCP UR&BREATH IA: CPT

## 2022-08-07 RX ORDER — NALOXONE HYDROCHLORIDE 4 MG/.1ML
SPRAY NASAL
Qty: 2 EACH | Refills: 2 | Status: SHIPPED | OUTPATIENT
Start: 2022-08-07 | End: 2022-09-04

## 2022-08-07 NOTE — ED NOTES
Discharge instructions reviewed with pt. No RX provided. PIV removed. Pt discharged with mother. No further questions noted.

## 2022-08-07 NOTE — ED NOTES
Assumed care of pt in rm 12. Pt here for overdose reports he took his Methadone and sorted some heroin. Pt is A O x 4, lung sounds clear, abd soft non-tender, skin intact. Pt very dc in the 42's but not symptomatic. 22 ga PIV started by EMS student to L wrist. Straight stick done to R hand, labs obtained and sent. Pt on full cardiac monitor, call bell within reach. Will continue to monitor. Pt reports he was scared to take his narcan at home. But it is available if needed.

## 2022-08-07 NOTE — ED TRIAGE NOTES
Pt stated that \"I snorted something this morning and I'm feeling out of it and short of breath. \" Pt states that he also took his methadone this morning

## 2022-08-07 NOTE — ED NOTES
No changes in pt condition, HR continues to be dc in the 40's. Pt aware of need for urine. Reports he can't void at this time.

## 2022-08-07 NOTE — ED PROVIDER NOTES
EMERGENCY DEPARTMENT HISTORY AND PHYSICAL EXAM    Date: 8/7/2022  Patient Name: Mariluz Main    History of Presenting Illness     Chief Complaint   Patient presents with    Shortness of Breath    Drug Problem         History Provided By: Patient    8:24 AM  Mariluz Main is a 49 y.o. male with PMHX of hypertension, polysubstance abuse, on methadone maintenance who presents to the emergency department C/O shortness of breath. Patient reports this morning at 4 AM he took his normal methadone dose but then also snorted some heroin. He notes that throughout the night he was also using alcohol and snorting cocaine. He states he was not trying to harm himself and is not suicidal.  He states after the 4 AM heroin and methadone he started to feel short of breath. He denies any fever, cough, chest pain, vomiting, bowel or urinary complaints. No known sick contacts or recent travel. No other relieving or exacerbating factors identified. PCP: Ana Sotelo NP    Current Outpatient Medications   Medication Sig Dispense Refill    naloxone (Narcan) 4 mg/actuation nasal spray Use 1 spray intranasally, then discard. Repeat with new spray every 2 min as needed for opioid overdose symptoms, alternating nostrils. 2 Each 2    methocarbamoL (Robaxin) 500 mg tablet Take 1 Tablet by mouth three (3) times daily. 15 Tablet 0    lidocaine (Lidoderm) 5 % Apply patch to the affected area for 12 hours a day and remove for 12 hours a day. 30 Each 0    aluminum-magnesium hydroxide 200-200 mg/5 mL susp 5 mL, diphenhydrAMINE 12.5 mg/5 mL liqd 12.5 mg, lidocaine 2 % soln 5 mL 5 mL by Swish and Spit route two (2) times a day. Magic mouth wash   Maalox  Lidocaine 2% viscous   Diphenhydramine oral solution     Pharmacy to mix equal portions of ingredients to a total volume as indicated in the dispense amount. 200 mL 0    fluticasone propionate (FLONASE) 50 mcg/actuation nasal spray 2 Sprays by Both Nostrils route daily.  16 g 0 albuterol (PROVENTIL HFA, VENTOLIN HFA, PROAIR HFA) 90 mcg/actuation inhaler Take 2 Puffs by inhalation every four (4) hours as needed for Wheezing. 1 Inhaler 0    amLODIPine (NORVASC) 5 mg tablet Take 5 mg by mouth daily. albuterol (PROVENTIL HFA, VENTOLIN HFA, PROAIR HFA) 90 mcg/actuation inhaler Take 2 Puffs by inhalation every four (4) hours as needed for Wheezing. 1 Inhaler 0    tadalafil (CIALIS) 20 mg tablet Take 1 Tab by mouth as needed (ED). Indications: Inability to have an Erection 30 Tab 3    METHADONE HCL (METHADONE PO) Take 85 mg by mouth daily. Past History       Past Medical History:  Past Medical History:   Diagnosis Date    ADD (attention deficit disorder)     Depression     Heroin abuse ([facility] [address]     Started age 14 years, on methadone clinic     History of erectile dysfunction     History of nocturia     History of tobacco use     Hypercholesteremia     Hypertension        Past Surgical History:  Past Surgical History:   Procedure Laterality Date    HX HERNIA REPAIR      surgery as infant       Family History:  Family History   Problem Relation Age of Onset    Depression Mother     Migraines Mother     Diabetes Mother     OSTEOARTHRITIS Mother     Alcohol abuse Father     Hypertension Father     High Cholesterol Father     OSTEOARTHRITIS Father     Alzheimer's Disease Maternal Grandmother     Eczema Maternal Grandmother        Social History:  Social History     Tobacco Use    Smoking status: Every Day     Packs/day: 0.50     Years: 25.00     Pack years: 12.50     Types: Cigarettes    Smokeless tobacco: Never   Substance Use Topics    Alcohol use: Yes     Alcohol/week: 1.0 standard drink     Types: 1 Cans of beer per week     Comment: rarely    Drug use: Yes     Types: Heroin       Allergies:  No Known Allergies      Review of Systems   Review of Systems   Constitutional:  Negative for fever. Respiratory:  Positive for shortness of breath. Cardiovascular:  Negative for chest pain. Gastrointestinal:  Negative for abdominal pain. All other systems reviewed and are negative. Physical Exam     Vitals:    08/07/22 0811 08/07/22 0812   BP:  112/66   Pulse:  (!) 49   Resp:  16   Temp: 97.2 °F (36.2 °C) (P) 97.5 °F (36.4 °C)   SpO2:  98%     Physical Exam    Nursing notes and vital signs reviewed    Constitutional: Non toxic appearing, moderate distress  Head: Normocephalic, Atraumatic  Eyes: EOMI  Neck: Supple  Cardiovascular: Regular rate and rhythm, no murmurs, rubs, or gallops  Chest: Normal work of breathing and chest excursion bilaterally  Lungs: Clear to ausculation bilaterally  Abdomen: Soft, non tender, non distended  Back: No evidence of trauma or deformity  Extremities: No evidence of trauma or deformity, no LE edema  Skin: Warm and dry, normal cap refill  Neuro: Alert and appropriate, face symmetric, normal speech, strength and sensation full and symmetric bilaterally, normal gait, normal coordination  Psychiatric: Normal mood and affect, denies SI or HI or any intent for self-harm      Diagnostic Study Results     Labs -     Recent Results (from the past 12 hour(s))   EKG, 12 LEAD, INITIAL    Collection Time: 08/07/22  9:14 AM   Result Value Ref Range    Ventricular Rate 43 BPM    Atrial Rate 43 BPM    P-R Interval 204 ms    QRS Duration 94 ms    Q-T Interval 522 ms    QTC Calculation (Bezet) 441 ms    Calculated P Axis 29 degrees    Calculated R Axis 10 degrees    Calculated T Axis 28 degrees    Diagnosis       Marked sinus bradycardia  Abnormal ECG  When compared with ECG of 11-MAR-2022 10:48,  Vent.  rate has decreased BY  31 BPM  Nonspecific T wave abnormality no longer evident in Lateral leads     CBC WITH AUTOMATED DIFF    Collection Time: 08/07/22  9:35 AM   Result Value Ref Range    WBC 9.1 4.6 - 13.2 K/uL    RBC 4.05 (L) 4.35 - 5.65 M/uL    HGB 11.3 (L) 13.0 - 16.0 g/dL    HCT 34.6 (L) 36.0 - 48.0 %    MCV 85.4 78.0 - 100.0 FL    MCH 27.9 24.0 - 34.0 PG    MCHC 32.7 31.0 - 37.0 g/dL    RDW 13.7 11.6 - 14.5 %    PLATELET 905 825 - 496 K/uL    MPV 9.2 9.2 - 11.8 FL    NRBC 0.0 0  WBC    ABSOLUTE NRBC 0.00 0.00 - 0.01 K/uL    NEUTROPHILS 74 (H) 40 - 73 %    LYMPHOCYTES 16 (L) 21 - 52 %    MONOCYTES 7 3 - 10 %    EOSINOPHILS 2 0 - 5 %    BASOPHILS 0 0 - 2 %    IMMATURE GRANULOCYTES 0 0.0 - 0.5 %    ABS. NEUTROPHILS 6.7 1.8 - 8.0 K/UL    ABS. LYMPHOCYTES 1.5 0.9 - 3.6 K/UL    ABS. MONOCYTES 0.6 0.05 - 1.2 K/UL    ABS. EOSINOPHILS 0.2 0.0 - 0.4 K/UL    ABS. BASOPHILS 0.0 0.0 - 0.1 K/UL    ABS. IMM. GRANS. 0.0 0.00 - 0.04 K/UL    DF AUTOMATED         Radiologic Studies -   XR CHEST PORT   Final Result   1. No evidence of acute cardiopulmonary disease. CT Results  (Last 48 hours)      None          CXR Results  (Last 48 hours)                 08/07/22 0839  XR CHEST PORT Final result    Impression:  1. No evidence of acute cardiopulmonary disease. Narrative:  PORTABLE CHEST X-RAY       CPT CODE: 60395        INDICATION: Shortness of breath. COMPARISON: Plain film 3/11/2022. FINDINGS:    Heart size appears borderline enlarged, though this is a portable semiupright   image which accentuates volume. Aortic arch does not appear enlarged. No pneumothorax appreciated. No overt pulmonary edema. No focal infiltrate or consolidation. No significant effusion on portable AP   image. Medications given in the ED-  Medications - No data to display      Medical Decision Making   I am the first provider for this patient. I reviewed the vital signs, available nursing notes, past medical history, past surgical history, family history and social history. Vital Signs-Reviewed the patient's vital signs.     Pulse Oximetry Analysis - 98% on room air, not hypoxic     Cardiac Monitor:  Rate: 41 bpm  Rhythm: Sinus bradycardia    EKG interpretation: (Preliminary)  EKG read by Dr. Reginaldo Lr at 9:17 AM  Sinus bradycardia at a rate of 43 bpm, PA interval 204 ms, QRS duration 94 ms, when compared to prior from March 2022 bradycardia is new but overall morphology is similar    Records Reviewed: Nursing Notes, Old Medical Records, and Previous electrocardiograms    Provider Notes (Medical Decision Making): Jovan Bourne is a 49 y.o. male presenting with shortness of breath in the setting of polysubstance abuse overnights. Patient is sleepy but arousable and does not at this time require Narcan. Patient is mixing multiple illicit substances with his methadone at home. He denies any SI. He states he is feeling short of breath. X-ray without acute process. EKG with sinus bradycardia. Labs at the time of signout are pending. Plan for continued supportive care pending the results of patient's evaluation. Anticipate likely discharge with referrals to primary care and [facility] with a prescription for Narcan. Procedures:  Procedures    ED Course:   10:05 AM   Patient care will be transferred to Dr. Andrés Silver from Dr. Charles Pimentel.  Discussed available diagnostic results and care plan at length. Pt made aware of provider change. Diagnosis and Disposition     Critical Care: None    CLINICAL IMPRESSION:    1. Polysubstance abuse ([address]      ____________________________      Please note that this dictation was completed with [employer], the computer voice recognition software. Quite often unanticipated grammatical, syntax, homophones, and other interpretive errors are inadvertently transcribed by the computer software. Please disregard these errors. Please excuse any errors that have escaped final proofreading.

## 2022-08-07 NOTE — Clinical Note
[address] [place] [address] Dr [facility] EMERGENCY DEPT  [id number] [address] [phone]    Work/School Note    Date: 8/7/2022    To Whom It May concern:      Angy Mcgarry was seen and treated today in the emergency room by the following provider(s):  Attending Provider: Jocelyne Mccarthy MD.      Angy Mcgarry is excused from work/school on 08/07/22. He is clear to return to work/school on 08/08/22.         Sincerely,          Augusta Javier MD

## 2022-08-07 NOTE — ED NOTES
Trop still in process, called lab to see what's taking so long lab reports it should be crossing over. Pt reports \"I don't care I want to go\". Dr. Otilio Cox made aware.

## 2022-08-07 NOTE — ED NOTES
The patient was signed out to me to follow repeat troponin and reevaluate the patient. The patient presented with some chest pressure after using multiple drugs overnight. The patient has been having intermittent chest pressure according patient's mother. The emergency department the patient has troponin that is unchanged. The patient would like to go home and I think that is reasonable at this time and will follow closely with his primary doctor and will return if at all worsened or concerned. The patient's heart rate fluctuates between 45 and 55 and when he is awake is above 50 while he is on the monitor. The patient is not on any rate controlling agents. The patient does understand that I need him to follow closely with his primary doctor to discuss the need for further testing. The patient was also encouraged to avoid any substance use moving forward. Workup and recommendations were reviewed with the patient and all questions were answered. The patient understands the plan and will proceed with close outpatient care. I have encouraged the patient to return if at all worsened or concerned.    Pablo Gentle, DO 2:05 PM

## 2022-08-08 LAB
ATRIAL RATE: 43 BPM
CALCULATED P AXIS, ECG09: 29 DEGREES
CALCULATED R AXIS, ECG10: 10 DEGREES
CALCULATED T AXIS, ECG11: 28 DEGREES
DIAGNOSIS, 93000: NORMAL
P-R INTERVAL, ECG05: 204 MS
Q-T INTERVAL, ECG07: 522 MS
QRS DURATION, ECG06: 94 MS
QTC CALCULATION (BEZET), ECG08: 441 MS
VENTRICULAR RATE, ECG03: 43 BPM

## 2022-09-04 ENCOUNTER — HOSPITAL ENCOUNTER (EMERGENCY)
Age: 47
Discharge: HOME | End: 2022-09-04
Attending: EMERGENCY MEDICINE
Payer: MEDICAID

## 2022-09-04 VITALS
WEIGHT: 175 LBS | HEIGHT: 73 IN | DIASTOLIC BLOOD PRESSURE: 68 MMHG | RESPIRATION RATE: 18 BRPM | BODY MASS INDEX: 23.19 KG/M2 | HEART RATE: 53 BPM | OXYGEN SATURATION: 98 % | TEMPERATURE: 97.9 F | SYSTOLIC BLOOD PRESSURE: 106 MMHG

## 2022-09-04 DIAGNOSIS — T50.901A ACCIDENTAL DRUG OVERDOSE, INITIAL ENCOUNTER: Primary | ICD-10-CM

## 2022-09-04 LAB
ANION GAP SERPL CALC-SCNC: 5 MMOL/L (ref 3–18)
BASOPHILS # BLD: 0 K/UL (ref 0–0.1)
BASOPHILS NFR BLD: 0 % (ref 0–2)
BUN SERPL-MCNC: 9 MG/DL (ref 7–18)
BUN/CREAT SERPL: 9 (ref 12–20)
CALCIUM SERPL-MCNC: 9 MG/DL (ref 8.5–10.1)
CHLORIDE SERPL-SCNC: 102 MMOL/L (ref 100–111)
CO2 SERPL-SCNC: 30 MMOL/L (ref 21–32)
CREAT SERPL-MCNC: 1.03 MG/DL (ref 0.6–1.3)
DIFFERENTIAL METHOD BLD: ABNORMAL
EOSINOPHIL # BLD: 0.6 K/UL (ref 0–0.4)
EOSINOPHIL NFR BLD: 8 % (ref 0–5)
ERYTHROCYTE [DISTWIDTH] IN BLOOD BY AUTOMATED COUNT: 14.5 % (ref 11.6–14.5)
GLUCOSE SERPL-MCNC: 172 MG/DL (ref 74–99)
HCT VFR BLD AUTO: 35.8 % (ref 36–48)
HGB BLD-MCNC: 11.4 G/DL (ref 13–16)
IMM GRANULOCYTES # BLD AUTO: 0 K/UL (ref 0–0.04)
IMM GRANULOCYTES NFR BLD AUTO: 0 % (ref 0–0.5)
LYMPHOCYTES # BLD: 1.5 K/UL (ref 0.9–3.6)
LYMPHOCYTES NFR BLD: 21 % (ref 21–52)
MCH RBC QN AUTO: 27.7 PG (ref 24–34)
MCHC RBC AUTO-ENTMCNC: 31.8 G/DL (ref 31–37)
MCV RBC AUTO: 87.1 FL (ref 78–100)
MONOCYTES # BLD: 0.5 K/UL (ref 0.05–1.2)
MONOCYTES NFR BLD: 6 % (ref 3–10)
NEUTS SEG # BLD: 4.6 K/UL (ref 1.8–8)
NEUTS SEG NFR BLD: 64 % (ref 40–73)
NRBC # BLD: 0 K/UL (ref 0–0.01)
NRBC BLD-RTO: 0 PER 100 WBC
PLATELET # BLD AUTO: 403 K/UL (ref 135–420)
PMV BLD AUTO: 9.4 FL (ref 9.2–11.8)
POTASSIUM SERPL-SCNC: 4.5 MMOL/L (ref 3.5–5.5)
RBC # BLD AUTO: 4.11 M/UL (ref 4.35–5.65)
SODIUM SERPL-SCNC: 137 MMOL/L (ref 136–145)
WBC # BLD AUTO: 7.2 K/UL (ref 4.6–13.2)

## 2022-09-04 PROCEDURE — 85025 COMPLETE CBC W/AUTO DIFF WBC: CPT

## 2022-09-04 PROCEDURE — 80048 BASIC METABOLIC PNL TOTAL CA: CPT

## 2022-09-04 PROCEDURE — 99283 EMERGENCY DEPT VISIT LOW MDM: CPT

## 2022-09-04 RX ORDER — NALOXONE HYDROCHLORIDE 4 MG/.1ML
SPRAY NASAL
Qty: 2 EACH | Refills: 2 | Status: SHIPPED | OUTPATIENT
Start: 2022-09-04

## 2022-09-04 NOTE — ED TRIAGE NOTES
Presents to the ER with a CO of overdose. The patient is very difficult to arouse. Pt stated he snorted something, it may be heroine. Pt keeps falling asleep.

## 2022-09-05 NOTE — ED PROVIDER NOTES
EMERGENCY DEPARTMENT HISTORY AND PHYSICAL EXAM    8:14 PM      Date: 9/4/2022  Patient Name: Sol Castelan    History of Presenting Illness     Chief Complaint   Patient presents with    Drug Overdose       History Provided By: Patient    Additional History (Context): Sol Castelan is a 49 y.o. male with  hx of ADD, depression, heroin abuse and other noted PMH  who presents with complaint of drug overdose. Patient notes he snorted a cap of heroin earlier today, notes he was dropped off by a friend. Notes no Narcan was administered prior to arrival.  Patient notes he feels tired. Patient denies dizziness, headache, chest pain, shortness of breath, abdominal pain, nausea or vomiting, diarrhea. Denies SI/HI. PCP: Fan Caraballo NP    Current Outpatient Medications   Medication Sig Dispense Refill    naloxone (Narcan) 4 mg/actuation nasal spray Use 1 spray intranasally, then discard. Repeat with new spray every 2 min as needed for opioid overdose symptoms, alternating nostrils. 2 Each 2    methocarbamoL (Robaxin) 500 mg tablet Take 1 Tablet by mouth three (3) times daily. 15 Tablet 0    lidocaine (Lidoderm) 5 % Apply patch to the affected area for 12 hours a day and remove for 12 hours a day. 30 Each 0    aluminum-magnesium hydroxide 200-200 mg/5 mL susp 5 mL, diphenhydrAMINE 12.5 mg/5 mL liqd 12.5 mg, lidocaine 2 % soln 5 mL 5 mL by Swish and Spit route two (2) times a day. Magic mouth wash   Maalox  Lidocaine 2% viscous   Diphenhydramine oral solution     Pharmacy to mix equal portions of ingredients to a total volume as indicated in the dispense amount. 200 mL 0    fluticasone propionate (FLONASE) 50 mcg/actuation nasal spray 2 Sprays by Both Nostrils route daily. 16 g 0    albuterol (PROVENTIL HFA, VENTOLIN HFA, PROAIR HFA) 90 mcg/actuation inhaler Take 2 Puffs by inhalation every four (4) hours as needed for Wheezing. 1 Inhaler 0    amLODIPine (NORVASC) 5 mg tablet Take 5 mg by mouth daily. albuterol (PROVENTIL HFA, VENTOLIN HFA, PROAIR HFA) 90 mcg/actuation inhaler Take 2 Puffs by inhalation every four (4) hours as needed for Wheezing. 1 Inhaler 0    tadalafil (CIALIS) 20 mg tablet Take 1 Tab by mouth as needed (ED). Indications: Inability to have an Erection 30 Tab 3    METHADONE HCL (METHADONE PO) Take 85 mg by mouth daily. Past History     Past Medical History:  Past Medical History:   Diagnosis Date    ADD (attention deficit disorder)     Depression     Heroin abuse ([address]     Started age 14 years, on methadone clinic     History of erectile dysfunction     History of nocturia     History of tobacco use     Hypercholesteremia     Hypertension        Past Surgical History:  Past Surgical History:   Procedure Laterality Date    HX HERNIA REPAIR      surgery as infant       Family History:  Family History   Problem Relation Age of Onset    Depression Mother     Migraines Mother     Diabetes Mother     OSTEOARTHRITIS Mother     Alcohol abuse Father     Hypertension Father     High Cholesterol Father     OSTEOARTHRITIS Father     Alzheimer's Disease Maternal Grandmother     Eczema Maternal Grandmother        Social History:  Social History     Tobacco Use    Smoking status: Every Day     Packs/day: 0.50     Years: 25.00     Pack years: 12.50     Types: Cigarettes    Smokeless tobacco: Never   Substance Use Topics    Alcohol use: Yes     Alcohol/week: 1.0 standard drink     Types: 1 Cans of beer per week     Comment: rarely    Drug use: Yes     Types: Heroin       Allergies:  No Known Allergies      Review of Systems       Review of Systems   Constitutional:  Positive for fatigue. Negative for chills and fever. Respiratory:  Negative for shortness of breath. Cardiovascular:  Negative for chest pain. Gastrointestinal:  Negative for abdominal pain, nausea and vomiting. Skin:  Negative for rash. Neurological:  Negative for weakness.    All other systems reviewed and are negative. Physical Exam   Visit Vitals  /68   Pulse (!) 53   Temp 97.9 °F (36.6 °C)   Resp 18   Ht 6' 1\" (1.854 m)   Wt 79.4 kg (175 lb)   SpO2 98%   BMI 23.09 kg/m²         Physical Exam  Vitals and nursing note reviewed. Constitutional:       General: He is not in acute distress. Appearance: Normal appearance. He is well-developed. He is not ill-appearing, toxic-appearing or diaphoretic. HENT:      Head: Normocephalic and atraumatic. Cardiovascular:      Rate and Rhythm: Normal rate and regular rhythm. Heart sounds: Normal heart sounds. No murmur heard. No friction rub. No gallop. Pulmonary:      Effort: Pulmonary effort is normal. No respiratory distress. Breath sounds: Normal breath sounds. No wheezing or rales. Musculoskeletal:         General: Normal range of motion. Cervical back: Normal range of motion and neck supple. Skin:     General: Skin is warm. Findings: No rash. Neurological:      General: No focal deficit present. Mental Status: He is alert and oriented to person, place, and time. Cranial Nerves: No cranial nerve deficit. Sensory: No sensory deficit. Motor: No weakness. Gait: Gait normal.         Diagnostic Study Results     Labs -  Recent Results (from the past 12 hour(s))   CBC WITH AUTOMATED DIFF    Collection Time: 09/04/22  7:49 PM   Result Value Ref Range    WBC 7.2 4.6 - 13.2 K/uL    RBC 4.11 (L) 4.35 - 5.65 M/uL    HGB 11.4 (L) 13.0 - 16.0 g/dL    HCT 35.8 (L) 36.0 - 48.0 %    MCV 87.1 78.0 - 100.0 FL    MCH 27.7 24.0 - 34.0 PG    MCHC 31.8 31.0 - 37.0 g/dL    RDW 14.5 11.6 - 14.5 %    PLATELET 937 044 - 688 K/uL    MPV 9.4 9.2 - 11.8 FL    NRBC 0.0 0  WBC    ABSOLUTE NRBC 0.00 0.00 - 0.01 K/uL    NEUTROPHILS 64 40 - 73 %    LYMPHOCYTES 21 21 - 52 %    MONOCYTES 6 3 - 10 %    EOSINOPHILS 8 (H) 0 - 5 %    BASOPHILS 0 0 - 2 %    IMMATURE GRANULOCYTES 0 0.0 - 0.5 %    ABS. NEUTROPHILS 4.6 1.8 - 8.0 K/UL    ABS. LYMPHOCYTES 1.5 0.9 - 3.6 K/UL    ABS. MONOCYTES 0.5 0.05 - 1.2 K/UL    ABS. EOSINOPHILS 0.6 (H) 0.0 - 0.4 K/UL    ABS. BASOPHILS 0.0 0.0 - 0.1 K/UL    ABS. IMM. GRANS. 0.0 0.00 - 0.04 K/UL    DF AUTOMATED     METABOLIC PANEL, BASIC    Collection Time: 09/04/22  7:49 PM   Result Value Ref Range    Sodium 137 136 - 145 mmol/L    Potassium 4.5 3.5 - 5.5 mmol/L    Chloride 102 100 - 111 mmol/L    CO2 30 21 - 32 mmol/L    Anion gap 5 3.0 - 18 mmol/L    Glucose 172 (H) 74 - 99 mg/dL    BUN 9 7.0 - 18 MG/DL    Creatinine 1.03 0.6 - 1.3 MG/DL    BUN/Creatinine ratio 9 (L) 12 - 20      GFR est AA >60 >60 ml/min/1.73m2    GFR est non-AA >60 >60 ml/min/1.73m2    Calcium 9.0 8.5 - 10.1 MG/DL       Radiologic Studies -   No orders to display         Medical Decision Making   I am the first provider for this patient. I reviewed the vital signs, available nursing notes, past medical history, past surgical history, family history and social history. Vital Signs-Reviewed the patient's vital signs. Records Reviewed: Nursing Notes, Old Medical Records, and Previous electrocardiograms (Time of Review: 8:14 PM)    ED Course: Progress Notes, Reevaluation, and Consults:  9:00 PM: Pt alert to verbal stimuli. Requesting water. 9:30 PM: Pt ambulatory throughout the ED, no distress. Notes he has a ride and feels ready to go home. 10:00 PM: Reviewed results and plan with patient. Discussed need for close outpatient follow-up this week for further assessment. Discussed strict return precautions, including dizziness, changes in vision, or any other medical concerns. In agreement with plan. Provider Notes (Medical Decision Making): 49year-old male who presents to the ED after heroin use. No Narcan was administered. Afebrile, nontoxic-appearing, looks well. Alert and oriented. Patient observed in the ED x 2.5 hours without respiratory distress or decompensation. Ambulatory, tolerating PO, no distress.   Stable for discharge with close outpatient follow-up for further assessment. Strict return precautions provided. Diagnosis     Clinical Impression:   1. Accidental drug overdose, initial encounter        Disposition: home     Follow-up Information       Follow up With Specialties Details Why [address]    [facility] EMERGENCY DEPT Emergency Medicine  If symptoms worsen [address] [address] [facility]    Ignacio Sykes NP Nurse Practitioner Schedule an appointment as soon as possible for a visit   [address] [place] [address] Road  [phone]               Current Discharge Medication List        CONTINUE these medications which have CHANGED    Details   naloxone (Narcan) 4 mg/actuation nasal spray Use 1 spray intranasally, then discard. Repeat with new spray every 2 min as needed for opioid overdose symptoms, alternating nostrils. Qty: 2 Each, Refills: 2           CONTINUE these medications which have NOT CHANGED    Details   methocarbamoL (Robaxin) 500 mg tablet Take 1 Tablet by mouth three (3) times daily. Qty: 15 Tablet, Refills: 0      lidocaine (Lidoderm) 5 % Apply patch to the affected area for 12 hours a day and remove for 12 hours a day. Qty: 30 Each, Refills: 0      aluminum-magnesium hydroxide 200-200 mg/5 mL susp 5 mL, diphenhydrAMINE 12.5 mg/5 mL liqd 12.5 mg, lidocaine 2 % soln 5 mL 5 mL by Swish and Spit route two (2) times a day. Magic mouth wash   Maalox  Lidocaine 2% viscous   Diphenhydramine oral solution     Pharmacy to mix equal portions of ingredients to a total volume as indicated in the dispense amount. Qty: 200 mL, Refills: 0      fluticasone propionate (FLONASE) 50 mcg/actuation nasal spray 2 Sprays by Both Nostrils route daily. Qty: 16 g, Refills: 0      !! albuterol (PROVENTIL HFA, VENTOLIN HFA, PROAIR HFA) 90 mcg/actuation inhaler Take 2 Puffs by inhalation every four (4) hours as needed for Wheezing.   Qty: 1 Inhaler, Refills: 0      amLODIPine (NORVASC) 5 mg tablet Take 5 mg by mouth daily. !! albuterol (PROVENTIL HFA, VENTOLIN HFA, PROAIR HFA) 90 mcg/actuation inhaler Take 2 Puffs by inhalation every four (4) hours as needed for Wheezing. Qty: 1 Inhaler, Refills: 0      tadalafil (CIALIS) 20 mg tablet Take 1 Tab by mouth as needed (ED). Indications: Inability to have an Erection  Qty: 30 Tab, Refills: 3    Associated Diagnoses: Erectile dysfunction, unspecified erectile dysfunction type      METHADONE HCL (METHADONE PO) Take 85 mg by mouth daily. !! - Potential duplicate medications found. Please discuss with provider. Dictation disclaimer:  Please note that this dictation was completed with [employer], the computer voice recognition software. Quite often unanticipated grammatical, syntax, homophones, and other interpretive errors are inadvertently transcribed by the computer software. Please disregard these errors. Please excuse any errors that have escaped final proofreading.

## 2022-10-17 NOTE — ED NOTES
Name:  Jessy Dai  Room:  0218/0218-01  MRN:    [id number]    Discharge Summary      This discharge summary is in conjunction with a complete physical exam done on the day of discharge. Attending Physician: Dr. Bonnie Verde  Discharging Physician: Dr. Canales Frame: 10/12/2022  Discharge:  10/17/2022    HPI:  The patient is a 57 y.o. male with PMH below, presents with R great toe pain, infection and swelling. Pt was sent to ER by Dr. Garret Canseco. Pt has completed multiple courses of PO ABX w/o improvement. He most recently finished Augmentin. He reports pain has worsened to be severe, exac by use. Dr. Garret Canseco requested IV ABX and NPO aft MN for probable amputation tomorrow. Diagnoses this Admission and Hospital Course   #Right great toe diabetic foot ulcer with abscess and osteomyelitis with pathological fracture at distal phalanx and surround cellulitis   -no leukocytosis   -blood cultures neg   -IVF-> stopped   -IV Vanc and cefepime -> continue  -prn percocet / IV morphine   -podiatry consulted   -S/P partial amputation right foot (right hallux amputated ) on 10/13. Seen by podiatry on Saturday and dressings changed. Plan  Continue current antibiotics. Follow-up on wound cultures -negative so far. Podiatry to reevaluate today  Plan is for discharge home on oral antibiotics     #Right lower extremity pitting edema   -could be 2/2 to above   -doppler negative  -stopped IVF   -elevate extremity   Improved     #Lactic acidosis  -IVF   -repeat resolved   -stopped IVF      #DM type 2   -resumed glipizide   -SSI   -continued to monitor BG      #CAD  -on ASA, statin, plavix      #HTN   -on lopressor   -continued to monitor      #GURDEEP   -home CPAP or BIPAP      DVT Prophylaxis: Lovenox     Procedures (Please Review Full Report for Details)  Partial right foot amputation.     Consults    Podiatry      Physical Exam at Discharge:    BP (!) 172/83   Pulse 81   Temp 98.2 °F (36.8 °C) (Oral)   Resp 16   Ht 5' Pt resting comfortable on stretcher, NAD, will continue to monitor. Family at bedside and updated on POC. 10\" (1.778 m)   Wt 250 lb (113.4 kg)   SpO2 100%   BMI 35.87 kg/m²     See today's progress note    CBC:   Recent Labs     10/15/22  0513 10/16/22  0516   WBC 4.9 4.9   HGB 13.7 12.1*   HCT 40.8 35.1*   MCV 87.5 85.7    267     BMP:   Recent Labs     10/15/22  0513 10/16/22  0519   * 135*   K 4.5 4.2   CL 98* 98*   CO2 21 29   BUN 6* 7   CREATININE 0.6* <0.5*         U/A:    Lab Results   Component Value Date/Time    NITRITE neg 08/03/2021 01:12 PM    COLORU Yellow 06/11/2022 05:44 PM    CLARITYU Clear 06/11/2022 05:44 PM    SPECGRAV 1.025 06/11/2022 05:44 PM    LEUKOCYTESUR Negative 06/11/2022 05:44 PM    BLOODU Negative 06/11/2022 05:44 PM    GLUCOSEU >=1000 06/11/2022 05:44 PM       ABG    Lab Results   Component Value Date/Time    AYH1HMG 21.8 02/19/2021 02:12 PM    BEART -4 02/19/2021 02:12 PM    A4YGFCAG 98 02/19/2021 02:12 PM    PHART 7.319 02/19/2021 02:12 PM    POK9QCF 42.4 02/19/2021 02:12 PM    PO2ART 108.5 02/19/2021 02:12 PM    AXK3JIP 23 02/19/2021 02:12 PM         CULTURES  COVID and Flu not detected   Blood cultures NGTD  Wound cultures NGTD    RADIOLOGY  VL Extremity Venous Right   Final Result      XR FOOT RIGHT (2 VIEWS)   Final Result   Postsurgical changes of interval amputation of the 1st ray and resection of   hallux sesamoids. CT FOOT RIGHT W CONTRAST   Final Result   Multilocular rim enhancing fluid collection surrounding the distal phalanx of   the great toe, greatest dorsally, compatible with a small abscess as   described above. That is associated with bony changes of osteomyelitis, including osteolysis,   periosteal new bone formation, with a pathologic fracture at the base of the   distal phalanx.       Soft tissue swelling is seen along the dorsum the foot, which is nonspecific   and can be from systemic etiologies such as venous insufficiency or fluid   overload, but cellulitis remains in the differential.         XR CHEST PORTABLE   Final Result   No acute

## 2022-11-12 ENCOUNTER — HOSPITAL ENCOUNTER (EMERGENCY)
Age: 47
Discharge: HOME | End: 2022-11-12
Attending: EMERGENCY MEDICINE
Payer: MEDICAID

## 2022-11-12 ENCOUNTER — APPOINTMENT (OUTPATIENT)
Dept: GENERAL RADIOLOGY | Age: 47
End: 2022-11-12
Attending: EMERGENCY MEDICINE
Payer: MEDICAID

## 2022-11-12 VITALS
SYSTOLIC BLOOD PRESSURE: 141 MMHG | HEART RATE: 104 BPM | RESPIRATION RATE: 25 BRPM | DIASTOLIC BLOOD PRESSURE: 79 MMHG | TEMPERATURE: 102.9 F | BODY MASS INDEX: 29.16 KG/M2 | WEIGHT: 220 LBS | OXYGEN SATURATION: 95 % | HEIGHT: 73 IN

## 2022-11-12 DIAGNOSIS — J10.1 INFLUENZA A: Primary | ICD-10-CM

## 2022-11-12 LAB
ALBUMIN SERPL-MCNC: 4.1 G/DL (ref 3.4–5)
ALBUMIN/GLOB SERPL: 1 {RATIO} (ref 0.8–1.7)
ALP SERPL-CCNC: 133 U/L (ref 45–117)
ALT SERPL-CCNC: 24 U/L (ref 16–61)
ANION GAP SERPL CALC-SCNC: 9 MMOL/L (ref 3–18)
AST SERPL-CCNC: 21 U/L (ref 10–38)
BASOPHILS # BLD: 0 K/UL (ref 0–0.1)
BASOPHILS NFR BLD: 0 % (ref 0–2)
BILIRUB SERPL-MCNC: 0.3 MG/DL (ref 0.2–1)
BUN SERPL-MCNC: 8 MG/DL (ref 7–18)
BUN/CREAT SERPL: 7 (ref 12–20)
CALCIUM SERPL-MCNC: 9 MG/DL (ref 8.5–10.1)
CHLORIDE SERPL-SCNC: 96 MMOL/L (ref 100–111)
CO2 SERPL-SCNC: 28 MMOL/L (ref 21–32)
CREAT SERPL-MCNC: 1.09 MG/DL (ref 0.6–1.3)
DIFFERENTIAL METHOD BLD: ABNORMAL
EOSINOPHIL # BLD: 0.1 K/UL (ref 0–0.4)
EOSINOPHIL NFR BLD: 1 % (ref 0–5)
ERYTHROCYTE [DISTWIDTH] IN BLOOD BY AUTOMATED COUNT: 15.1 % (ref 11.6–14.5)
FLUAV RNA SPEC QL NAA+PROBE: DETECTED
FLUBV RNA SPEC QL NAA+PROBE: NOT DETECTED
GLOBULIN SER CALC-MCNC: 4.3 G/DL (ref 2–4)
GLUCOSE SERPL-MCNC: 149 MG/DL (ref 74–99)
HCT VFR BLD AUTO: 37.9 % (ref 36–48)
HGB BLD-MCNC: 12.5 G/DL (ref 13–16)
IMM GRANULOCYTES # BLD AUTO: 0 K/UL (ref 0–0.04)
IMM GRANULOCYTES NFR BLD AUTO: 0 % (ref 0–0.5)
LYMPHOCYTES # BLD: 0.5 K/UL (ref 0.9–3.6)
LYMPHOCYTES NFR BLD: 5 % (ref 21–52)
MCH RBC QN AUTO: 28.2 PG (ref 24–34)
MCHC RBC AUTO-ENTMCNC: 33 G/DL (ref 31–37)
MCV RBC AUTO: 85.6 FL (ref 78–100)
MONOCYTES # BLD: 1 K/UL (ref 0.05–1.2)
MONOCYTES NFR BLD: 9 % (ref 3–10)
NEUTS SEG # BLD: 9.4 K/UL (ref 1.8–8)
NEUTS SEG NFR BLD: 84 % (ref 40–73)
NRBC # BLD: 0 K/UL (ref 0–0.01)
NRBC BLD-RTO: 0 PER 100 WBC
PLATELET # BLD AUTO: 423 K/UL (ref 135–420)
PMV BLD AUTO: 9.6 FL (ref 9.2–11.8)
POTASSIUM SERPL-SCNC: 4.4 MMOL/L (ref 3.5–5.5)
PROT SERPL-MCNC: 8.4 G/DL (ref 6.4–8.2)
RBC # BLD AUTO: 4.43 M/UL (ref 4.35–5.65)
SARS-COV-2, COV2: NOT DETECTED
SODIUM SERPL-SCNC: 133 MMOL/L (ref 136–145)
TROPONIN-HIGH SENSITIVITY: 5 NG/L (ref 0–78)
WBC # BLD AUTO: 11.2 K/UL (ref 4.6–13.2)

## 2022-11-12 PROCEDURE — 74011250637 HC RX REV CODE- 250/637: Performed by: EMERGENCY MEDICINE

## 2022-11-12 PROCEDURE — 87636 SARSCOV2 & INF A&B AMP PRB: CPT

## 2022-11-12 PROCEDURE — 93005 ELECTROCARDIOGRAM TRACING: CPT

## 2022-11-12 PROCEDURE — 85025 COMPLETE CBC W/AUTO DIFF WBC: CPT

## 2022-11-12 PROCEDURE — 84484 ASSAY OF TROPONIN QUANT: CPT

## 2022-11-12 PROCEDURE — 74011250637 HC RX REV CODE- 250/637: Performed by: STUDENT IN AN ORGANIZED HEALTH CARE EDUCATION/TRAINING PROGRAM

## 2022-11-12 PROCEDURE — 99285 EMERGENCY DEPT VISIT HI MDM: CPT

## 2022-11-12 PROCEDURE — 71046 X-RAY EXAM CHEST 2 VIEWS: CPT

## 2022-11-12 PROCEDURE — 80053 COMPREHEN METABOLIC PANEL: CPT

## 2022-11-12 RX ORDER — ACETAMINOPHEN 500 MG
1000 TABLET ORAL ONCE
Status: DISCONTINUED | OUTPATIENT
Start: 2022-11-12 | End: 2022-11-12

## 2022-11-12 RX ORDER — OSELTAMIVIR PHOSPHATE 75 MG/1
75 CAPSULE ORAL 2 TIMES DAILY
Qty: 10 CAPSULE | Refills: 0 | Status: SHIPPED | OUTPATIENT
Start: 2022-11-12 | End: 2022-11-17

## 2022-11-12 RX ORDER — IBUPROFEN 400 MG/1
800 TABLET ORAL ONCE
Status: DISCONTINUED | OUTPATIENT
Start: 2022-11-12 | End: 2022-11-13 | Stop reason: HOSPADM

## 2022-11-12 RX ORDER — FLUTICASONE PROPIONATE 50 MCG
2 SPRAY, SUSPENSION (ML) NASAL DAILY
Qty: 16 G | Refills: 0 | Status: SHIPPED | OUTPATIENT
Start: 2022-11-12

## 2022-11-12 RX ORDER — ACETAMINOPHEN 500 MG
1000 TABLET ORAL ONCE
Status: COMPLETED | OUTPATIENT
Start: 2022-11-12 | End: 2022-11-12

## 2022-11-12 RX ADMIN — ACETAMINOPHEN 1000 MG: 500 TABLET ORAL at 18:51

## 2022-11-12 NOTE — ED TRIAGE NOTES
Pt to ED with complaints of right upper chest pain, fever, chills, and body aches that started yesterday.

## 2022-11-13 LAB
ATRIAL RATE: 102 BPM
CALCULATED R AXIS, ECG10: 3 DEGREES
CALCULATED T AXIS, ECG11: 87 DEGREES
DIAGNOSIS, 93000: NORMAL
P-R INTERVAL, ECG05: 112 MS
Q-T INTERVAL, ECG07: 496 MS
QRS DURATION, ECG06: 94 MS
QTC CALCULATION (BEZET), ECG08: 646 MS
VENTRICULAR RATE, ECG03: 102 BPM

## 2022-11-13 NOTE — ED PROVIDER NOTES
[facility]  Emergency Department Treatment Report        Patient: Freedom Mccarty Age: 49 y.o. Sex: male    YOB: 1975 Admit Date: 11/12/2022 PCP: Aminta Pena NP   MRN: [id number]  CSN: [id number]     Room: [place] [address] Time Dictated: 8:02 PM      Chief Complaint   Chief Complaint   Patient presents with    Chest Pain       History of Present Illness   49 y.o. male 2 days of viral URI-like symptoms. Also having chest pain. Pain worse with cough. Primary symptoms of fever, chills, rhinorrhea, congestion. Denies any abdominal pain, nausea, vomiting, lower extremity swelling or pain. Review of Systems   Review of Systems   Constitutional:  Positive for chills, fever and malaise/fatigue. HENT:  Positive for congestion. Negative for sinus pain and sore throat. Eyes:  Negative for blurred vision and photophobia. Respiratory:  Positive for cough, sputum production and shortness of breath. Negative for hemoptysis and wheezing. Cardiovascular:  Positive for chest pain. Negative for palpitations, orthopnea, claudication and leg swelling. Gastrointestinal:  Negative for abdominal pain and vomiting. Genitourinary:  Negative for dysuria and urgency. Musculoskeletal:  Negative for back pain and falls. Skin:  Negative for itching and rash. Neurological:  Negative for weakness and headaches.       Past Medical/Surgical History     Past Medical History:   Diagnosis Date    ADD (attention deficit disorder)     Depression     Heroin abuse ([address]     Started age 14 years, on methadone clinic     History of erectile dysfunction     History of nocturia     History of tobacco use     Hypercholesteremia     Hypertension       Past Surgical History:   Procedure Laterality Date    HX HERNIA REPAIR      surgery as infant       Social History     Social History     Socioeconomic History    Marital status: SINGLE     Spouse name: Not on file    Number of children: Not on file    Years of education: GED    Highest education level: Not on file   Occupational History    Not on file   Tobacco Use    Smoking status: Every Day     Packs/day: 0.50     Years: 25.00     Pack years: 12.50     Types: Cigarettes    Smokeless tobacco: Never   Substance and Sexual Activity    Alcohol use: Yes     Alcohol/week: 1.0 standard drink     Types: 1 Cans of beer per week     Comment: rarely    Drug use: Yes     Types: Heroin    Sexual activity: Yes     Partners: Female     Birth control/protection: None   Other Topics Concern     Service Not Asked    Blood Transfusions Not Asked    Caffeine Concern Not Asked    Occupational Exposure Not Asked    Hobby Hazards Not Asked    Sleep Concern Not Asked    Stress Concern Not Asked    Weight Concern Not Asked    Special Diet Not Asked    Back Care Not Asked    Exercise No    Bike Helmet Not Asked    Seat Belt No    Self-Exams Not Asked   Social History Narrative    Not on file     Social Determinants of Health     Financial Resource Strain: Not on file   Food Insecurity: Not on file   Transportation Needs: Not on file   Physical Activity: Not on file   Stress: Not on file   Social Connections: Not on file   Intimate Partner Violence: Not on file   Housing Stability: Not on file        Family History     Family History   Problem Relation Age of Onset    Depression Mother     Migraines Mother     Diabetes Mother     OSTEOARTHRITIS Mother     Alcohol abuse Father     Hypertension Father     High Cholesterol Father     OSTEOARTHRITIS Father     Alzheimer's Disease Maternal Grandmother     Eczema Maternal Grandmother         Current Medications     Prior to Admission Medications   Prescriptions Last Dose Informant Patient Reported? Taking? METHADONE HCL (METHADONE PO)   Yes No   Sig: Take 85 mg by mouth daily. albuterol (PROVENTIL HFA, VENTOLIN HFA, PROAIR HFA) 90 mcg/actuation inhaler   No No   Sig: Take 2 Puffs by inhalation every four (4) hours as needed for Wheezing. albuterol (PROVENTIL HFA, VENTOLIN HFA, PROAIR HFA) 90 mcg/actuation inhaler   No No   Sig: Take 2 Puffs by inhalation every four (4) hours as needed for Wheezing. aluminum-magnesium hydroxide 200-200 mg/5 mL susp 5 mL, diphenhydrAMINE 12.5 mg/5 mL liqd 12.5 mg, lidocaine 2 % soln 5 mL   No No   Si mL by Swish and Spit route two (2) times a day. Magic mouth wash   Maalox  Lidocaine 2% viscous   Diphenhydramine oral solution     Pharmacy to mix equal portions of ingredients to a total volume as indicated in the dispense amount. amLODIPine (NORVASC) 5 mg tablet   Yes No   Sig: Take 5 mg by mouth daily. fluticasone propionate (FLONASE) 50 mcg/actuation nasal spray   No No   Si Sprays by Both Nostrils route daily. lidocaine (Lidoderm) 5 %   No No   Sig: Apply patch to the affected area for 12 hours a day and remove for 12 hours a day. methocarbamoL (Robaxin) 500 mg tablet   No No   Sig: Take 1 Tablet by mouth three (3) times daily. naloxone (Narcan) 4 mg/actuation nasal spray   No No   Sig: Use 1 spray intranasally, then discard. Repeat with new spray every 2 min as needed for opioid overdose symptoms, alternating nostrils. tadalafil (CIALIS) 20 mg tablet   No No   Sig: Take 1 Tab by mouth as needed (ED). Indications: Inability to have an Erection      Facility-Administered Medications: None        Allergies   No Known Allergies    Physical Exam     ED Triage Vitals [22 1840]   ED Encounter Vitals Group      /82      Pulse (Heart Rate) (!) 103      Resp Rate 21      Temp (!) 102.9 °F (39.4 °C)      Temp src       O2 Sat (%) 94 %      Weight 225 lb      Height 6' 1\"        Physical Exam  Vitals and nursing note reviewed. Constitutional:       General: He is not in acute distress. HENT:      Head: Normocephalic and atraumatic. Nose: Rhinorrhea present. Mouth/Throat:      Mouth: Mucous membranes are moist.      Pharynx: Oropharynx is clear.    Eyes:      Conjunctiva/sclera: Conjunctivae normal.      Pupils: Pupils are equal, round, and reactive to light. Cardiovascular:      Rate and Rhythm: Normal rate and regular rhythm. Pulses: Normal pulses. Heart sounds: Normal heart sounds. Pulmonary:      Effort: Pulmonary effort is normal.      Breath sounds: Normal breath sounds. Abdominal:      Palpations: Abdomen is soft. Tenderness: There is no abdominal tenderness. Musculoskeletal:         General: Normal range of motion. Cervical back: Normal range of motion and neck supple. Right lower leg: No edema. Left lower leg: No edema. Skin:     General: Skin is warm and dry. Capillary Refill: Capillary refill takes less than 2 seconds. Neurological:      General: No focal deficit present. Mental Status: He is alert and oriented to person, place, and time. Impression and Management Plan   19-year-old male with viral URI-like symptoms for 2 days    DDX COVID, flu, pneumonia, ACS, CHF  Diagnostic Studies   Lab:   Recent Results (from the past 12 hour(s))   CBC WITH AUTOMATED DIFF    Collection Time: 11/12/22  6:28 PM   Result Value Ref Range    WBC 11.2 4.6 - 13.2 K/uL    RBC 4.43 4.35 - 5.65 M/uL    HGB 12.5 (L) 13.0 - 16.0 g/dL    HCT 37.9 36.0 - 48.0 %    MCV 85.6 78.0 - 100.0 FL    MCH 28.2 24.0 - 34.0 PG    MCHC 33.0 31.0 - 37.0 g/dL    RDW 15.1 (H) 11.6 - 14.5 %    PLATELET 462 (H) 686 - 420 K/uL    MPV 9.6 9.2 - 11.8 FL    NRBC 0.0 0  WBC    ABSOLUTE NRBC 0.00 0.00 - 0.01 K/uL    NEUTROPHILS 84 (H) 40 - 73 %    LYMPHOCYTES 5 (L) 21 - 52 %    MONOCYTES 9 3 - 10 %    EOSINOPHILS 1 0 - 5 %    BASOPHILS 0 0 - 2 %    IMMATURE GRANULOCYTES 0 0.0 - 0.5 %    ABS. NEUTROPHILS 9.4 (H) 1.8 - 8.0 K/UL    ABS. LYMPHOCYTES 0.5 (L) 0.9 - 3.6 K/UL    ABS. MONOCYTES 1.0 0.05 - 1.2 K/UL    ABS. EOSINOPHILS 0.1 0.0 - 0.4 K/UL    ABS. BASOPHILS 0.0 0.0 - 0.1 K/UL    ABS. IMM.  GRANS. 0.0 0.00 - 0.04 K/UL    DF AUTOMATED     METABOLIC PANEL, COMPREHENSIVE    Collection Time: 11/12/22  6:28 PM   Result Value Ref Range    Sodium 133 (L) 136 - 145 mmol/L    Potassium 4.4 3.5 - 5.5 mmol/L    Chloride 96 (L) 100 - 111 mmol/L    CO2 28 21 - 32 mmol/L    Anion gap 9 3.0 - 18 mmol/L    Glucose 149 (H) 74 - 99 mg/dL    BUN 8 7.0 - 18 MG/DL    Creatinine 1.09 0.6 - 1.3 MG/DL    BUN/Creatinine ratio 7 (L) 12 - 20      eGFR >60 >60 ml/min/1.73m2    Calcium 9.0 8.5 - 10.1 MG/DL    Bilirubin, total 0.3 0.2 - 1.0 MG/DL    ALT (SGPT) 24 16 - 61 U/L    AST (SGOT) 21 10 - 38 U/L    Alk. phosphatase 133 (H) 45 - 117 U/L    Protein, total 8.4 (H) 6.4 - 8.2 g/dL    Albumin 4.1 3.4 - 5.0 g/dL    Globulin 4.3 (H) 2.0 - 4.0 g/dL    A-G Ratio 1.0 0.8 - 1.7     TROPONIN-HIGH SENSITIVITY    Collection Time: 11/12/22  6:28 PM   Result Value Ref Range    Troponin-High Sensitivity 5 0 - 78 ng/L   COVID-19 WITH INFLUENZA A/B    Collection Time: 11/12/22  6:46 PM   Result Value Ref Range    SARS-CoV-2 by PCR Not detected NOTD      Influenza A by PCR Detected (A) NOTD      Influenza B by PCR Not detected NOTD       Labs Reviewed   COVID-19 WITH INFLUENZA A/B - Abnormal; Notable for the following components:       Result Value    Influenza A by PCR Detected (*)     All other components within normal limits   CBC WITH AUTOMATED DIFF - Abnormal; Notable for the following components:    HGB 12.5 (*)     RDW 15.1 (*)     PLATELET 852 (*)     NEUTROPHILS 84 (*)     LYMPHOCYTES 5 (*)     ABS. NEUTROPHILS 9.4 (*)     ABS. LYMPHOCYTES 0.5 (*)     All other components within normal limits   METABOLIC PANEL, COMPREHENSIVE - Abnormal; Notable for the following components:    Sodium 133 (*)     Chloride 96 (*)     Glucose 149 (*)     BUN/Creatinine ratio 7 (*)     Alk.  phosphatase 133 (*)     Protein, total 8.4 (*)     Globulin 4.3 (*)     All other components within normal limits   TROPONIN-HIGH SENSITIVITY       Imaging:    XR CHEST PA LAT    Result Date: 11/12/2022  EXAM: XR CHEST PA LAT CLINICAL INDICATION/HISTORY : chest pain. COMPARISON: August 7, 2022 TECHNIQUE: 2 VIEWS FINDINGS: Mild bibasilar streaky opacities. No focal lung consolidation. The heart and mediastinum are unremarkable. No evidence of pleural effusion. No acute osseous findings     1. Mild streaky bibasilar atelectasis         ED Course/MDM   I reviewed the patients vitals signs which showed febrile with some mild hypertension but otherwise reassuring    Bedside telemetry monitoring ordered due to acuity. My interpretation of the cardiac monitor is normal sinus at 95 BPM     I reviewed pts imaging personally and my interpretation is no focal intrathoracic process    History of substance abuse. Viral URI-like symptoms. Reassuring exam.  Febrile on arrival.  Lab work-up without leukocytosis. Negative troponin. Reassuring imaging. Influenza A positive. Counseled patient on findings, treatment, follow-up, return precautions. Patient stated understanding. Patient stable for discharge. Procedures      Final Diagnosis       ICD-10-CM ICD-9-CM   1. Influenza A  J10.1 487.1       Disposition   Discharge      The patient was personally evaluated by myself and Dr. Forrest Keys who agrees with the above assessment and plan. Elham Donahue MD  [facility]  November 12, 2022    My signature above authenticates this document and my orders, the final    diagnosis (es), discharge prescription (s), and instructions in the Epic    record. If you have any questions please contact [phone]. Nursing notes have been reviewed by the physician/ advanced practice    Clinician. Dragon medical dictation software was used for portions of this report. Unintended voice recognition errors may occur.

## 2023-07-17 ENCOUNTER — APPOINTMENT (OUTPATIENT)
Facility: HOSPITAL | Age: 48
End: 2023-07-17
Payer: MEDICAID

## 2023-07-17 ENCOUNTER — HOSPITAL ENCOUNTER (INPATIENT)
Facility: HOSPITAL | Age: 48
LOS: 4 days | Discharge: HOME | End: 2023-07-21
Attending: STUDENT IN AN ORGANIZED HEALTH CARE EDUCATION/TRAINING PROGRAM | Admitting: HOSPITALIST
Payer: MEDICAID

## 2023-07-17 DIAGNOSIS — D64.9 SYMPTOMATIC ANEMIA: Primary | ICD-10-CM

## 2023-07-17 DIAGNOSIS — K92.2 GASTROINTESTINAL HEMORRHAGE, UNSPECIFIED GASTROINTESTINAL HEMORRHAGE TYPE: ICD-10-CM

## 2023-07-17 PROBLEM — R94.31 QT PROLONGATION: Status: ACTIVE | Noted: 2023-07-17

## 2023-07-17 PROBLEM — F98.8 ADD (ATTENTION DEFICIT DISORDER): Chronic | Status: ACTIVE | Noted: 2023-07-17

## 2023-07-17 PROBLEM — F11.90 METHADONE USE: Chronic | Status: ACTIVE | Noted: 2023-07-17

## 2023-07-17 PROBLEM — T39.395A NSAID INDUCED GASTRITIS: Status: ACTIVE | Noted: 2023-07-17

## 2023-07-17 PROBLEM — F32.A DEPRESSION: Chronic | Status: ACTIVE | Noted: 2023-07-17

## 2023-07-17 PROBLEM — Z72.0 TOBACCO ABUSE: Chronic | Status: ACTIVE | Noted: 2023-07-17

## 2023-07-17 PROBLEM — I10 HYPERTENSION: Chronic | Status: ACTIVE | Noted: 2023-07-17

## 2023-07-17 PROBLEM — E78.5 HYPERLIPIDEMIA: Chronic | Status: ACTIVE | Noted: 2023-07-17

## 2023-07-17 PROBLEM — K29.60 NSAID INDUCED GASTRITIS: Status: ACTIVE | Noted: 2023-07-17

## 2023-07-17 LAB
AMPHET UR QL SCN: NEGATIVE
ANION GAP SERPL CALC-SCNC: 3 MMOL/L (ref 3–18)
APTT PPP: 24.3 SEC (ref 23–36.4)
BARBITURATES UR QL SCN: NEGATIVE
BASOPHILS # BLD: 0 K/UL (ref 0–0.1)
BASOPHILS NFR BLD: 0 % (ref 0–2)
BENZODIAZ UR QL: NEGATIVE
BUN SERPL-MCNC: 16 MG/DL (ref 7–18)
BUN/CREAT SERPL: 18 (ref 12–20)
CALCIUM SERPL-MCNC: 8.3 MG/DL (ref 8.5–10.1)
CANNABINOIDS UR QL SCN: NEGATIVE
CHLORIDE SERPL-SCNC: 101 MMOL/L (ref 100–111)
CO2 SERPL-SCNC: 32 MMOL/L (ref 21–32)
COCAINE UR QL SCN: POSITIVE
CREAT SERPL-MCNC: 0.9 MG/DL (ref 0.6–1.3)
DIFFERENTIAL METHOD BLD: ABNORMAL
EKG ATRIAL RATE: 92 BPM
EKG DIAGNOSIS: NORMAL
EKG P AXIS: 54 DEGREES
EKG P-R INTERVAL: 164 MS
EKG Q-T INTERVAL: 412 MS
EKG QRS DURATION: 92 MS
EKG QTC CALCULATION (BAZETT): 509 MS
EKG R AXIS: 53 DEGREES
EKG T AXIS: 91 DEGREES
EKG VENTRICULAR RATE: 92 BPM
EOSINOPHIL # BLD: 0 K/UL (ref 0–0.4)
EOSINOPHIL NFR BLD: 0 % (ref 0–5)
ERYTHROCYTE [DISTWIDTH] IN BLOOD BY AUTOMATED COUNT: 16.8 % (ref 11.6–14.5)
FLUAV RNA SPEC QL NAA+PROBE: NOT DETECTED
FLUBV RNA SPEC QL NAA+PROBE: NOT DETECTED
GLUCOSE SERPL-MCNC: 110 MG/DL (ref 74–99)
HCT VFR BLD AUTO: 15.3 % (ref 36–48)
HCT VFR BLD AUTO: 21.1 % (ref 36–48)
HCT VFR BLD AUTO: 22.7 % (ref 36–48)
HEMOCCULT STL QL: NEGATIVE
HGB BLD-MCNC: 4.7 G/DL (ref 13–16)
HGB BLD-MCNC: 6.7 G/DL (ref 13–16)
HGB BLD-MCNC: 7.1 G/DL (ref 13–16)
HISTORY CHECK: NORMAL
IMM GRANULOCYTES # BLD AUTO: 0.1 K/UL (ref 0–0.04)
IMM GRANULOCYTES NFR BLD AUTO: 1 % (ref 0–0.5)
INR PPP: 1 (ref 0.8–1.2)
LYMPHOCYTES # BLD: 1 K/UL (ref 0.9–3.6)
LYMPHOCYTES NFR BLD: 9 % (ref 21–52)
Lab: ABNORMAL
MAGNESIUM SERPL-MCNC: 2.1 MG/DL (ref 1.6–2.6)
MCH RBC QN AUTO: 25.4 PG (ref 24–34)
MCHC RBC AUTO-ENTMCNC: 30.7 G/DL (ref 31–37)
MCV RBC AUTO: 82.7 FL (ref 78–100)
METHADONE UR QL: POSITIVE
MONOCYTES # BLD: 0.8 K/UL (ref 0.05–1.2)
MONOCYTES NFR BLD: 7 % (ref 3–10)
NEUTS SEG # BLD: 9 K/UL (ref 1.8–8)
NEUTS SEG NFR BLD: 83 % (ref 40–73)
NRBC # BLD: 0.13 K/UL (ref 0–0.01)
NRBC BLD-RTO: 1.2 PER 100 WBC
NT PRO BNP: 52 PG/ML (ref 0–450)
OPIATES UR QL: NEGATIVE
PCP UR QL: NEGATIVE
PLATELET # BLD AUTO: 373 K/UL (ref 135–420)
PMV BLD AUTO: 8.9 FL (ref 9.2–11.8)
POTASSIUM SERPL-SCNC: 3.9 MMOL/L (ref 3.5–5.5)
PROTHROMBIN TIME: 13.2 SEC (ref 11.5–15.2)
RBC # BLD AUTO: 1.85 M/UL (ref 4.35–5.65)
SARS-COV-2 RNA RESP QL NAA+PROBE: NOT DETECTED
SODIUM SERPL-SCNC: 136 MMOL/L (ref 136–145)
TROPONIN I SERPL HS-MCNC: 9 NG/L (ref 0–78)
WBC # BLD AUTO: 10.8 K/UL (ref 4.6–13.2)

## 2023-07-17 PROCEDURE — 94761 N-INVAS EAR/PLS OXIMETRY MLT: CPT

## 2023-07-17 PROCEDURE — G0378 HOSPITAL OBSERVATION PER HR: HCPCS

## 2023-07-17 PROCEDURE — 86923 COMPATIBILITY TEST ELECTRIC: CPT

## 2023-07-17 PROCEDURE — 93005 ELECTROCARDIOGRAM TRACING: CPT | Performed by: STUDENT IN AN ORGANIZED HEALTH CARE EDUCATION/TRAINING PROGRAM

## 2023-07-17 PROCEDURE — 1100000000 HC RM PRIVATE

## 2023-07-17 PROCEDURE — 83735 ASSAY OF MAGNESIUM: CPT

## 2023-07-17 PROCEDURE — 86900 BLOOD TYPING SEROLOGIC ABO: CPT

## 2023-07-17 PROCEDURE — 85610 PROTHROMBIN TIME: CPT

## 2023-07-17 PROCEDURE — 85025 COMPLETE CBC W/AUTO DIFF WBC: CPT

## 2023-07-17 PROCEDURE — 6360000002 HC RX W HCPCS: Performed by: STUDENT IN AN ORGANIZED HEALTH CARE EDUCATION/TRAINING PROGRAM

## 2023-07-17 PROCEDURE — 86901 BLOOD TYPING SEROLOGIC RH(D): CPT

## 2023-07-17 PROCEDURE — 85730 THROMBOPLASTIN TIME PARTIAL: CPT

## 2023-07-17 PROCEDURE — 99223 1ST HOSP IP/OBS HIGH 75: CPT | Performed by: INTERNAL MEDICINE

## 2023-07-17 PROCEDURE — 71045 X-RAY EXAM CHEST 1 VIEW: CPT

## 2023-07-17 PROCEDURE — 6360000002 HC RX W HCPCS: Performed by: HOSPITALIST

## 2023-07-17 PROCEDURE — A4216 STERILE WATER/SALINE, 10 ML: HCPCS | Performed by: STUDENT IN AN ORGANIZED HEALTH CARE EDUCATION/TRAINING PROGRAM

## 2023-07-17 PROCEDURE — 85018 HEMOGLOBIN: CPT

## 2023-07-17 PROCEDURE — 93010 ELECTROCARDIOGRAM REPORT: CPT | Performed by: INTERNAL MEDICINE

## 2023-07-17 PROCEDURE — P9016 RBC LEUKOCYTES REDUCED: HCPCS

## 2023-07-17 PROCEDURE — 83880 ASSAY OF NATRIURETIC PEPTIDE: CPT

## 2023-07-17 PROCEDURE — C9113 INJ PANTOPRAZOLE SODIUM, VIA: HCPCS | Performed by: STUDENT IN AN ORGANIZED HEALTH CARE EDUCATION/TRAINING PROGRAM

## 2023-07-17 PROCEDURE — 82272 OCCULT BLD FECES 1-3 TESTS: CPT

## 2023-07-17 PROCEDURE — 80307 DRUG TEST PRSMV CHEM ANLYZR: CPT

## 2023-07-17 PROCEDURE — 87636 SARSCOV2 & INF A&B AMP PRB: CPT

## 2023-07-17 PROCEDURE — 86850 RBC ANTIBODY SCREEN: CPT

## 2023-07-17 PROCEDURE — 85014 HEMATOCRIT: CPT

## 2023-07-17 PROCEDURE — 80048 BASIC METABOLIC PNL TOTAL CA: CPT

## 2023-07-17 PROCEDURE — 2580000003 HC RX 258: Performed by: STUDENT IN AN ORGANIZED HEALTH CARE EDUCATION/TRAINING PROGRAM

## 2023-07-17 PROCEDURE — 6370000000 HC RX 637 (ALT 250 FOR IP): Performed by: STUDENT IN AN ORGANIZED HEALTH CARE EDUCATION/TRAINING PROGRAM

## 2023-07-17 PROCEDURE — 99285 EMERGENCY DEPT VISIT HI MDM: CPT

## 2023-07-17 PROCEDURE — 84484 ASSAY OF TROPONIN QUANT: CPT

## 2023-07-17 PROCEDURE — 2580000003 HC RX 258: Performed by: INTERNAL MEDICINE

## 2023-07-17 PROCEDURE — 36415 COLL VENOUS BLD VENIPUNCTURE: CPT

## 2023-07-17 PROCEDURE — 30233N1 TRANSFUSION OF NONAUTOLOGOUS RED BLOOD CELLS INTO PERIPHERAL VEIN, PERCUTANEOUS APPROACH: ICD-10-PCS | Performed by: INTERNAL MEDICINE

## 2023-07-17 RX ORDER — POLYETHYLENE GLYCOL 3350 17 G/17G
17 POWDER, FOR SOLUTION ORAL DAILY PRN
Status: DISCONTINUED | OUTPATIENT
Start: 2023-07-17 | End: 2023-07-21 | Stop reason: HOSPADM

## 2023-07-17 RX ORDER — SODIUM CHLORIDE 0.9 % (FLUSH) 0.9 %
5-40 SYRINGE (ML) INJECTION EVERY 12 HOURS SCHEDULED
Status: DISCONTINUED | OUTPATIENT
Start: 2023-07-17 | End: 2023-07-21 | Stop reason: HOSPADM

## 2023-07-17 RX ORDER — SODIUM CHLORIDE 9 MG/ML
INJECTION, SOLUTION INTRAVENOUS CONTINUOUS
Status: DISCONTINUED | OUTPATIENT
Start: 2023-07-17 | End: 2023-07-21 | Stop reason: HOSPADM

## 2023-07-17 RX ORDER — MECOBALAMIN 5000 MCG
5 TABLET,DISINTEGRATING ORAL NIGHTLY PRN
Status: DISCONTINUED | OUTPATIENT
Start: 2023-07-17 | End: 2023-07-21 | Stop reason: HOSPADM

## 2023-07-17 RX ORDER — SODIUM CHLORIDE 9 MG/ML
INJECTION, SOLUTION INTRAVENOUS PRN
Status: DISCONTINUED | OUTPATIENT
Start: 2023-07-17 | End: 2023-07-21 | Stop reason: HOSPADM

## 2023-07-17 RX ORDER — METHADONE HYDROCHLORIDE 10 MG/ML
115 CONCENTRATE ORAL
COMMUNITY

## 2023-07-17 RX ORDER — IPRATROPIUM BROMIDE AND ALBUTEROL SULFATE 2.5; .5 MG/3ML; MG/3ML
1 SOLUTION RESPIRATORY (INHALATION) EVERY 4 HOURS PRN
Status: DISCONTINUED | OUTPATIENT
Start: 2023-07-17 | End: 2023-07-21 | Stop reason: HOSPADM

## 2023-07-17 RX ORDER — ACETAMINOPHEN 650 MG/1
650 SUPPOSITORY RECTAL EVERY 6 HOURS PRN
Status: DISCONTINUED | OUTPATIENT
Start: 2023-07-17 | End: 2023-07-21 | Stop reason: HOSPADM

## 2023-07-17 RX ORDER — ACETAMINOPHEN 325 MG/1
650 TABLET ORAL EVERY 6 HOURS PRN
Status: DISCONTINUED | OUTPATIENT
Start: 2023-07-17 | End: 2023-07-21 | Stop reason: HOSPADM

## 2023-07-17 RX ORDER — METHADONE HYDROCHLORIDE 10 MG/ML
115 CONCENTRATE ORAL DAILY
Status: DISCONTINUED | OUTPATIENT
Start: 2023-07-17 | End: 2023-07-21 | Stop reason: HOSPADM

## 2023-07-17 RX ORDER — ONDANSETRON 2 MG/ML
4 INJECTION INTRAMUSCULAR; INTRAVENOUS EVERY 8 HOURS PRN
Status: DISCONTINUED | OUTPATIENT
Start: 2023-07-17 | End: 2023-07-21 | Stop reason: HOSPADM

## 2023-07-17 RX ORDER — SODIUM CHLORIDE 0.9 % (FLUSH) 0.9 %
5-40 SYRINGE (ML) INJECTION PRN
Status: DISCONTINUED | OUTPATIENT
Start: 2023-07-17 | End: 2023-07-21 | Stop reason: HOSPADM

## 2023-07-17 RX ADMIN — SODIUM CHLORIDE, PRESERVATIVE FREE 10 ML: 5 INJECTION INTRAVENOUS at 07:58

## 2023-07-17 RX ADMIN — ONDANSETRON 4 MG: 2 INJECTION INTRAMUSCULAR; INTRAVENOUS at 16:11

## 2023-07-17 RX ADMIN — SODIUM CHLORIDE 40 MG: 9 INJECTION INTRAMUSCULAR; INTRAVENOUS; SUBCUTANEOUS at 08:06

## 2023-07-17 RX ADMIN — SODIUM CHLORIDE 40 MG: 9 INJECTION INTRAMUSCULAR; INTRAVENOUS; SUBCUTANEOUS at 16:11

## 2023-07-17 RX ADMIN — SODIUM CHLORIDE: 900 INJECTION, SOLUTION INTRAVENOUS at 11:37

## 2023-07-17 RX ADMIN — METHADONE HYDROCHLORIDE 115 MG: 10 CONCENTRATE ORAL at 08:39

## 2023-07-17 ASSESSMENT — ENCOUNTER SYMPTOMS
NAUSEA: 1
SORE THROAT: 0
ABDOMINAL PAIN: 0
SHORTNESS OF BREATH: 0
VOMITING: 1
DIARRHEA: 0
CHEST TIGHTNESS: 0

## 2023-07-17 ASSESSMENT — PAIN SCALES - GENERAL
PAINLEVEL_OUTOF10: 8
PAINLEVEL_OUTOF10: 0

## 2023-07-17 ASSESSMENT — PAIN - FUNCTIONAL ASSESSMENT: PAIN_FUNCTIONAL_ASSESSMENT: NONE - DENIES PAIN

## 2023-07-17 NOTE — CONSULTS
[email or web address]  [phone]    GASTROENTEROLOGY CONSULT      Impression:   1. UGIB - c/o melena, coffee ground emesis PTA, stool heme negative in ED, no prior scopes    - seen by [address] [place] [address] for CRCS, never completed  2. Acute anemia - hgb 4.7 on arrival, currently being transfused 3 units, repeat hgb pending  3. HTN  4. ETOH/polysubstance abuse - currently on methadone      Plan:     1. Will plan for EGD once h/h improved and Tox screen is negative for cocaine. 2. Monitor h/h, transfuse to hgb > 7.0  3. Continue IV PPI  4. Hold anticoagulation  5. Diet per primary team  6. Medical management per primary team      Chief Complaint: Symptomatic anemia      HPI:  Clemente Richards is a 51 y.o. male who I am being asked to see in consultation for an opinion regarding the above. He presented to  ED complaining of fatigue and weakness for past 2 days. He reports melena and coffee ground emesis prior to admission. In the ED he was found to have h/h of 4.7/15.3 and heme negative stool. He has a hx of polysubstance abuse, currently on methadone, tox screen today is positive for this and cocaine. He denies prior episodes of anemia or melena. He denies abdominal pain, fever, chills, or unexplained weight loss. He was last seen by [facility] in 2020 for colonoscopy screening which was never completed.      PMH:   Past Medical History:   Diagnosis Date    ADD (attention deficit disorder)     Depression     Heroin abuse ([address])     Started age 14 years, on methadone clinic     History of erectile dysfunction     History of nocturia     History of tobacco use     Hypercholesteremia     Hypertension        PSH:   Past Surgical History:   Procedure Laterality Date    HERNIA REPAIR      surgery as infant       Social HX:   Social History     Socioeconomic History    Marital status: Single     Spouse name: Not on file    Number of children: Not on file    Years of education: GED    Highest education level: Not on file

## 2023-07-17 NOTE — ED TRIAGE NOTES
52 y/o male reports to ED w/cco generalized weakness and fatigue that has gotten worse over the last two days. PT states he feels weaker when he tries to exert himself in any way.

## 2023-07-17 NOTE — PROGRESS NOTES
[facility]  Progress Note    Patient: Theodoro Klinefelter Age: 51 y.o. : 1975 MR#: [id number] SSN: [id number]  Date/Time: 2023     Subjective:   24-Hr Events:  - Admitted overnight  - s/p 3 of 3 units pRBCs  - GI consulted by ER physician    Patient reports no new concerns. No BM since arrival, urinating w/out difficulty. No N/V, asking for water. Review of systems  General: No fevers or chills. Cardiovascular: No chest pain or pressure. No palpitations. Pulmonary: No shortness of breath, cough or wheeze. Gastrointestinal: No abdominal pain, nausea, vomiting or diarrhea. Genitourinary: No urinary frequency, urgency, hesitancy or dysuria. Musculoskeletal: No joint or muscle pain, no back pain, no recent trauma. Neurologic: No headache, numbness, tingling or weakness. Assessment/Plan:     Symptomatic Anemia with suspicion for Aspirin Overuse-Induced Gastric Ulcer  - s/p 3 units PRBCs, pending post-transfusion CBC  - Patient reports daily use of ~3.18 grams of Aspirin x months  - continuous telemetry  - NPO (except medications)  - IV Fluids ( mL/hr), consider discontinuing now clinically euvolemic  - IV Pantoprazole 40 mg BID  - H&H q8hrs, and Transfuse as necessary to Target Hemoglobin of >=7.0 g/dL. - Gastroenterological services were consulted, pending EGD once H/H improved and tox screen negative for cocaine, repeat UDS today pending at time of note creation  - Avoid NSAIDs     Severe QT Prolongation  - QTc Prolongation (646 ms) on initial EKG on 23. Repeat Qtc 509 ms, improved. Avoid QTc Prolonging agents. Hypertension  Continue home Amlodipine. Hyperlipidemia  No current home medications for this issue. Depression, ADD  No current home medications for this issue. Opioid Addiction with Methadone Use  Continue Methadone 115 mg qday.      Tobacco Abuse  - Consider transdermal nicotine patch while admitted (14mg daily)  -

## 2023-07-17 NOTE — ED NOTES
Rate changed to 150mL after first full hour of administration. No signs of blood transfusion reaction.      Gary Rowley RN  07/17/23 7216

## 2023-07-17 NOTE — PROGRESS NOTES
INTERIM UPDATE - [id number] EST on 7/17/2023    [facility] ER Physician calls requesting admission for a 50-year-old male who present with complaint of Generalized Weakness and Fatigue. [facility] ER Physician reports that Patient has endorses hematemesis and melena over the last week with three episodes perhaps every other day. Patient's Hemoglobin is 4.7 g/dL (baseline is 11.5-12.5 g/dL?). However, Fecal Occult is negative. [facility] ER Physician reports that sample was grossly brown and did not appear grossly positive. [facility] ER Physician states that he will call Gastroenterological services and order PRBC replacement. Plan:  If Gastroenterological services states that they wish to scope Patient in the inpatient setting, will admit Patient. Also ordered Serum Magnesium Add-On due to QTc Interval >600 ms. Ordered UDS due to history of significant drug use. Will await call back.

## 2023-07-17 NOTE — PROGRESS NOTES
Patient admitted to  for anemia. He is in room 202. Patient presented to floor with nausea and vomiting symptoms. Zofran IV was given. Nurse educated patient on SBAR, admission, labs, plan of care and room environment. Mother was at bedside to help with admission process. Patient states he has no questions or concerns at this time.

## 2023-07-17 NOTE — ED NOTES
No transfusion reaction suspected. Rate changed from 60mL/hr to 120mL/hr.      Eugenie Macedo RN  07/17/23 5313

## 2023-07-17 NOTE — ED NOTES
This RN attempted to call report and was told to wait 10minutes. This Rn waited 10 minutes without success to give report . Critical Pt incoming and unable to provide any linnette period.      Alaina Watson RN  07/17/23 [id number]

## 2023-07-17 NOTE — ED PROVIDER NOTES
EMERGENCY DEPARTMENT HISTORY AND PHYSICAL EXAM      Date: 7/17/2023  Patient Name: Sulma Patel    History of Presenting Illness     Chief Complaint   Patient presents with    Fatigue       Patient is a 41-year-old male with history of alcohol abuse, substance abuse, hypertension, hypercholesterolemia presenting to the emergency department via EMS for complaints of generalized weakness and fatigue for the past 2 days. Patient also reports having melanotic stool and having coffee-ground emesis. Denies having any fevers or chills, chest pain or shortness of breath. PCP: HOWARD Kuhn NP    Current Facility-Administered Medications   Medication Dose Route Frequency Provider Last Rate Last Admin    0.9 % sodium chloride infusion   IntraVENous PRN Celia Murray DO        pantoprazole (PROTONIX) 40 mg in sodium chloride (PF) 0.9 % 10 mL injection  40 mg IntraVENous Q12H Katja Olivier DO         Current Outpatient Medications   Medication Sig Dispense Refill    albuterol sulfate HFA (PROVENTIL;VENTOLIN;PROAIR) 108 (90 Base) MCG/ACT inhaler Inhale 2 puffs into the lungs every 4 hours as needed      amLODIPine (NORVASC) 5 MG tablet Take 5 mg by mouth daily      fluticasone (FLONASE) 50 MCG/ACT nasal spray 2 sprays by Nasal route daily      lidocaine (LIDODERM) 5 % Apply patch to the affected area for 12 hours a day and remove for 12 hours a day. methocarbamol (ROBAXIN) 500 MG tablet Take 500 mg by mouth 3 times daily      naloxone 4 MG/0.1ML LIQD nasal spray Use 1 spray intranasally, then discard. Repeat with new spray every 2 min as needed for opioid overdose symptoms, alternating nostrils.       tadalafil (CIALIS) 20 MG tablet Take 20 mg by mouth as needed         Past History     Past Medical History:  Past Medical History:   Diagnosis Date    ADD (attention deficit disorder)     Depression     Heroin abuse ([address])     Started age 14 years, on methadone clinic     History of erectile

## 2023-07-17 NOTE — PLAN OF CARE
Problem: Discharge Planning  Goal: Discharge to home or other facility with appropriate resources  Outcome: Progressing  Flowsheets  Taken 7/17/2023 1617  Discharge to home or other facility with appropriate resources: Identify discharge learning needs (meds, wound care, etc)  Taken 7/17/2023 1530  Discharge to home or other facility with appropriate resources: Identify discharge learning needs (meds, wound care, etc)     Problem: ABCDS Injury Assessment  Goal: Absence of physical injury  Outcome: Progressing

## 2023-07-18 PROBLEM — K92.2 GI BLEED: Status: ACTIVE | Noted: 2023-07-18

## 2023-07-18 LAB
ALBUMIN SERPL-MCNC: 3 G/DL (ref 3.4–5)
ALBUMIN/GLOB SERPL: 1 (ref 0.8–1.7)
ALP SERPL-CCNC: 76 U/L (ref 45–117)
ALT SERPL-CCNC: 10 U/L (ref 16–61)
AMPHET UR QL SCN: NEGATIVE
ANION GAP SERPL CALC-SCNC: 7 MMOL/L (ref 3–18)
AST SERPL-CCNC: 15 U/L (ref 10–38)
BARBITURATES UR QL SCN: NEGATIVE
BASOPHILS # BLD: 0 K/UL (ref 0–0.1)
BASOPHILS NFR BLD: 0 % (ref 0–2)
BENZODIAZ UR QL: NEGATIVE
BILIRUB SERPL-MCNC: 0.7 MG/DL (ref 0.2–1)
BUN SERPL-MCNC: 13 MG/DL (ref 7–18)
BUN/CREAT SERPL: 16 (ref 12–20)
CALCIUM SERPL-MCNC: 8.4 MG/DL (ref 8.5–10.1)
CANNABINOIDS UR QL SCN: NEGATIVE
CHLORIDE SERPL-SCNC: 103 MMOL/L (ref 100–111)
CO2 SERPL-SCNC: 28 MMOL/L (ref 21–32)
COCAINE UR QL SCN: POSITIVE
CREAT SERPL-MCNC: 0.79 MG/DL (ref 0.6–1.3)
DIFFERENTIAL METHOD BLD: ABNORMAL
EOSINOPHIL # BLD: 0 K/UL (ref 0–0.4)
EOSINOPHIL NFR BLD: 0 % (ref 0–5)
ERYTHROCYTE [DISTWIDTH] IN BLOOD BY AUTOMATED COUNT: 17.2 % (ref 11.6–14.5)
GLOBULIN SER CALC-MCNC: 3.1 G/DL (ref 2–4)
GLUCOSE SERPL-MCNC: 98 MG/DL (ref 74–99)
HCT VFR BLD AUTO: 22.5 % (ref 36–48)
HCT VFR BLD AUTO: 24.4 % (ref 36–48)
HCT VFR BLD AUTO: 27.4 % (ref 36–48)
HGB BLD-MCNC: 6.9 G/DL (ref 13–16)
HGB BLD-MCNC: 7.6 G/DL (ref 13–16)
HGB BLD-MCNC: 8.6 G/DL (ref 13–16)
HISTORY CHECK: NORMAL
IMM GRANULOCYTES # BLD AUTO: 0.1 K/UL (ref 0–0.04)
IMM GRANULOCYTES NFR BLD AUTO: 1 % (ref 0–0.5)
LYMPHOCYTES # BLD: 0.9 K/UL (ref 0.9–3.6)
LYMPHOCYTES NFR BLD: 7 % (ref 21–52)
Lab: ABNORMAL
MAGNESIUM SERPL-MCNC: 2.3 MG/DL (ref 1.6–2.6)
MCH RBC QN AUTO: 25.7 PG (ref 24–34)
MCHC RBC AUTO-ENTMCNC: 30.7 G/DL (ref 31–37)
MCV RBC AUTO: 84 FL (ref 78–100)
METHADONE UR QL: POSITIVE
MONOCYTES # BLD: 0.9 K/UL (ref 0.05–1.2)
MONOCYTES NFR BLD: 7 % (ref 3–10)
NEUTS SEG # BLD: 10.6 K/UL (ref 1.8–8)
NEUTS SEG NFR BLD: 85 % (ref 40–73)
NRBC # BLD: 0.1 K/UL (ref 0–0.01)
NRBC BLD-RTO: 0.8 PER 100 WBC
OPIATES UR QL: NEGATIVE
PCP UR QL: NEGATIVE
PHOSPHATE SERPL-MCNC: 3.6 MG/DL (ref 2.5–4.9)
PLATELET # BLD AUTO: 422 K/UL (ref 135–420)
PMV BLD AUTO: 9.2 FL (ref 9.2–11.8)
POTASSIUM SERPL-SCNC: 4 MMOL/L (ref 3.5–5.5)
PROT SERPL-MCNC: 6.1 G/DL (ref 6.4–8.2)
RBC # BLD AUTO: 2.68 M/UL (ref 4.35–5.65)
SODIUM SERPL-SCNC: 138 MMOL/L (ref 136–145)
WBC # BLD AUTO: 12.4 K/UL (ref 4.6–13.2)

## 2023-07-18 PROCEDURE — 2580000003 HC RX 258: Performed by: STUDENT IN AN ORGANIZED HEALTH CARE EDUCATION/TRAINING PROGRAM

## 2023-07-18 PROCEDURE — 85018 HEMOGLOBIN: CPT

## 2023-07-18 PROCEDURE — 6360000002 HC RX W HCPCS: Performed by: HOSPITALIST

## 2023-07-18 PROCEDURE — G0378 HOSPITAL OBSERVATION PER HR: HCPCS

## 2023-07-18 PROCEDURE — 6370000000 HC RX 637 (ALT 250 FOR IP): Performed by: HOSPITALIST

## 2023-07-18 PROCEDURE — 83735 ASSAY OF MAGNESIUM: CPT

## 2023-07-18 PROCEDURE — 6370000000 HC RX 637 (ALT 250 FOR IP): Performed by: STUDENT IN AN ORGANIZED HEALTH CARE EDUCATION/TRAINING PROGRAM

## 2023-07-18 PROCEDURE — 84100 ASSAY OF PHOSPHORUS: CPT

## 2023-07-18 PROCEDURE — 6360000002 HC RX W HCPCS: Performed by: STUDENT IN AN ORGANIZED HEALTH CARE EDUCATION/TRAINING PROGRAM

## 2023-07-18 PROCEDURE — A4216 STERILE WATER/SALINE, 10 ML: HCPCS | Performed by: STUDENT IN AN ORGANIZED HEALTH CARE EDUCATION/TRAINING PROGRAM

## 2023-07-18 PROCEDURE — 80053 COMPREHEN METABOLIC PANEL: CPT

## 2023-07-18 PROCEDURE — C9113 INJ PANTOPRAZOLE SODIUM, VIA: HCPCS | Performed by: STUDENT IN AN ORGANIZED HEALTH CARE EDUCATION/TRAINING PROGRAM

## 2023-07-18 PROCEDURE — 85025 COMPLETE CBC W/AUTO DIFF WBC: CPT

## 2023-07-18 PROCEDURE — 36430 TRANSFUSION BLD/BLD COMPNT: CPT

## 2023-07-18 PROCEDURE — P9016 RBC LEUKOCYTES REDUCED: HCPCS

## 2023-07-18 PROCEDURE — 99233 SBSQ HOSP IP/OBS HIGH 50: CPT | Performed by: NURSE PRACTITIONER

## 2023-07-18 PROCEDURE — 94761 N-INVAS EAR/PLS OXIMETRY MLT: CPT

## 2023-07-18 PROCEDURE — 36415 COLL VENOUS BLD VENIPUNCTURE: CPT

## 2023-07-18 PROCEDURE — 85014 HEMATOCRIT: CPT

## 2023-07-18 PROCEDURE — 1100000000 HC RM PRIVATE

## 2023-07-18 PROCEDURE — 2580000003 HC RX 258: Performed by: INTERNAL MEDICINE

## 2023-07-18 RX ORDER — HYDROXYZINE PAMOATE 25 MG/1
25 CAPSULE ORAL EVERY 8 HOURS PRN
Status: DISCONTINUED | OUTPATIENT
Start: 2023-07-18 | End: 2023-07-21 | Stop reason: HOSPADM

## 2023-07-18 RX ORDER — NICOTINE 21 MG/24HR
1 PATCH, TRANSDERMAL 24 HOURS TRANSDERMAL DAILY
Status: DISCONTINUED | OUTPATIENT
Start: 2023-07-18 | End: 2023-07-21 | Stop reason: HOSPADM

## 2023-07-18 RX ORDER — LORAZEPAM 2 MG/ML
1 INJECTION INTRAMUSCULAR ONCE
Status: COMPLETED | OUTPATIENT
Start: 2023-07-18 | End: 2023-07-18

## 2023-07-18 RX ORDER — SODIUM CHLORIDE 9 MG/ML
INJECTION, SOLUTION INTRAVENOUS PRN
Status: DISCONTINUED | OUTPATIENT
Start: 2023-07-18 | End: 2023-07-21 | Stop reason: HOSPADM

## 2023-07-18 RX ADMIN — HYDROXYZINE PAMOATE 25 MG: 25 CAPSULE ORAL at 18:57

## 2023-07-18 RX ADMIN — ONDANSETRON 4 MG: 2 INJECTION INTRAMUSCULAR; INTRAVENOUS at 17:51

## 2023-07-18 RX ADMIN — SODIUM CHLORIDE 40 MG: 9 INJECTION INTRAMUSCULAR; INTRAVENOUS; SUBCUTANEOUS at 06:13

## 2023-07-18 RX ADMIN — ONDANSETRON 4 MG: 2 INJECTION INTRAMUSCULAR; INTRAVENOUS at 03:10

## 2023-07-18 RX ADMIN — SODIUM CHLORIDE, PRESERVATIVE FREE 10 ML: 5 INJECTION INTRAVENOUS at 10:10

## 2023-07-18 RX ADMIN — SODIUM CHLORIDE 40 MG: 9 INJECTION INTRAMUSCULAR; INTRAVENOUS; SUBCUTANEOUS at 17:21

## 2023-07-18 RX ADMIN — SODIUM CHLORIDE, PRESERVATIVE FREE 10 ML: 5 INJECTION INTRAVENOUS at 20:52

## 2023-07-18 RX ADMIN — METHADONE HYDROCHLORIDE 115 MG: 10 CONCENTRATE ORAL at 10:10

## 2023-07-18 RX ADMIN — SODIUM CHLORIDE, PRESERVATIVE FREE 10 ML: 5 INJECTION INTRAVENOUS at 00:19

## 2023-07-18 RX ADMIN — LORAZEPAM 1 MG: 2 INJECTION INTRAMUSCULAR; INTRAVENOUS at 03:08

## 2023-07-18 ASSESSMENT — PAIN SCALES - GENERAL
PAINLEVEL_OUTOF10: 0
PAINLEVEL_OUTOF10: 0

## 2023-07-18 NOTE — CARE COORDINATION
Case Management Assessment  Initial Evaluation    Date/Time of Evaluation: 7/18/2023 3:53 PM  Assessment Completed by: Vivian Koch RN    If patient is discharged prior to next notation, then this note serves as note for discharge by case management. Patient Name: Manju Garcia                   YOB: 1975  Diagnosis: Symptomatic anemia [D64.9]  Gastrointestinal hemorrhage, unspecified gastrointestinal hemorrhage type [K92.2]  GI bleed [K92.2]                   Date / Time: 7/17/2023  2:57 AM    Patient Admission Status: Inpatient   Readmission Risk (Low < 19, Mod (19-27), High > 27): Readmission Risk Score: 9.9    Current PCP: HOWARD Ratliff NP  PCP verified by CM? (P) Yes    Chart Reviewed: Yes      History Provided by: (P) Patient, Child/Family  Patient Orientation: (P) Alert and Oriented    Patient Cognition: (P) Alert    Hospitalization in the last 30 days (Readmission):  No    If yes, Readmission Assessment in CM Navigator will be completed.     Advance Directives:      Code Status: Full Code   Patient's Primary Decision Maker is: (P) Legal Next of Kin      Discharge Planning:    Patient lives with: (P) Parent Type of Home: (P) House  Primary Care Giver: (P) Self  Patient Support Systems include: (P) Children, Parent, Family Members   Current Financial resources: (P) Medicaid  Current community resources: (P) None  Current services prior to admission: (P) Other (Comment) ([address] in [place])            Current DME:              Type of Home Care services:  (P) None    ADLS  Prior functional level: (P) Independent in ADLs/IADLs  Current functional level: (P) Independent in ADLs/IADLs    PT AM-PAC:   /24  OT AM-PAC:   /24    Family can provide assistance at DC: (P) Yes  Would you like Case Management to discuss the discharge plan with any other family members/significant others, and if so, who? (P) Yes (Chase Davis, mother)  Plans to Return to Present Housing: (P)

## 2023-07-18 NOTE — PROGRESS NOTES
I got a call from primary nurse that repeat hemoglobin is 6.7 after 3 units of PRBC transfusion will transfuse 1 more unit of PRBC and repeat posttransfusion H&H.

## 2023-07-18 NOTE — PROGRESS NOTES
2:47 AM    Patient up and walking around the bed. Seem to be anxious and pacing. Call MD for an order for atvain. 2:51 AM    Patient Hemoglobin is at 6.7              Hematocrit is at 21.1    Patient is alert pacing around the room.

## 2023-07-19 LAB
ABO + RH BLD: NORMAL
AMPHET UR QL SCN: NEGATIVE
AMPHET UR QL SCN: NEGATIVE
ANION GAP SERPL CALC-SCNC: 1 MMOL/L (ref 3–18)
BARBITURATES UR QL SCN: NEGATIVE
BARBITURATES UR QL SCN: NEGATIVE
BASOPHILS # BLD: 0 K/UL (ref 0–0.1)
BASOPHILS NFR BLD: 0 % (ref 0–2)
BENZODIAZ UR QL: NEGATIVE
BENZODIAZ UR QL: NEGATIVE
BLD PROD TYP BPU: NORMAL
BLOOD BANK DISPENSE STATUS: NORMAL
BLOOD GROUP ANTIBODIES SERPL: NORMAL
BPU ID: NORMAL
BUN SERPL-MCNC: 11 MG/DL (ref 7–18)
BUN/CREAT SERPL: 14 (ref 12–20)
CALCIUM SERPL-MCNC: 8.5 MG/DL (ref 8.5–10.1)
CALLED TO: NORMAL
CALLED TO:: NORMAL
CANNABINOIDS UR QL SCN: NEGATIVE
CANNABINOIDS UR QL SCN: NEGATIVE
CHLORIDE SERPL-SCNC: 102 MMOL/L (ref 100–111)
CO2 SERPL-SCNC: 32 MMOL/L (ref 21–32)
COCAINE UR QL SCN: POSITIVE
COCAINE UR QL SCN: POSITIVE
CREAT SERPL-MCNC: 0.77 MG/DL (ref 0.6–1.3)
CROSSMATCH RESULT: NORMAL
DIFFERENTIAL METHOD BLD: ABNORMAL
EOSINOPHIL # BLD: 0.1 K/UL (ref 0–0.4)
EOSINOPHIL NFR BLD: 1 % (ref 0–5)
ERYTHROCYTE [DISTWIDTH] IN BLOOD BY AUTOMATED COUNT: 16.2 % (ref 11.6–14.5)
FERRITIN SERPL-MCNC: 8 NG/ML (ref 8–388)
GLUCOSE SERPL-MCNC: 83 MG/DL (ref 74–99)
HCT VFR BLD AUTO: 23.2 % (ref 36–48)
HCT VFR BLD AUTO: 24.8 % (ref 36–48)
HCT VFR BLD AUTO: 25.4 % (ref 36–48)
HGB BLD-MCNC: 7 G/DL (ref 13–16)
HGB BLD-MCNC: 7.7 G/DL (ref 13–16)
HGB BLD-MCNC: 7.9 G/DL (ref 13–16)
IMM GRANULOCYTES # BLD AUTO: 0.1 K/UL (ref 0–0.04)
IMM GRANULOCYTES NFR BLD AUTO: 1 % (ref 0–0.5)
IRON SATN MFR SERPL: 4 % (ref 20–50)
IRON SERPL-MCNC: 19 UG/DL (ref 50–175)
LYMPHOCYTES # BLD: 1.6 K/UL (ref 0.9–3.6)
LYMPHOCYTES NFR BLD: 12 % (ref 21–52)
Lab: ABNORMAL
Lab: ABNORMAL
MCH RBC QN AUTO: 26.8 PG (ref 24–34)
MCHC RBC AUTO-ENTMCNC: 31.1 G/DL (ref 31–37)
MCV RBC AUTO: 86.1 FL (ref 78–100)
METHADONE UR QL: POSITIVE
METHADONE UR QL: POSITIVE
MONOCYTES # BLD: 1.1 K/UL (ref 0.05–1.2)
MONOCYTES NFR BLD: 9 % (ref 3–10)
NEUTS SEG # BLD: 9.8 K/UL (ref 1.8–8)
NEUTS SEG NFR BLD: 78 % (ref 40–73)
NRBC # BLD: 0.07 K/UL (ref 0–0.01)
NRBC BLD-RTO: 0.6 PER 100 WBC
OPIATES UR QL: NEGATIVE
OPIATES UR QL: NEGATIVE
PCP UR QL: NEGATIVE
PCP UR QL: NEGATIVE
PLATELET # BLD AUTO: 429 K/UL (ref 135–420)
PMV BLD AUTO: 9.2 FL (ref 9.2–11.8)
POTASSIUM SERPL-SCNC: 3.8 MMOL/L (ref 3.5–5.5)
RBC # BLD AUTO: 2.95 M/UL (ref 4.35–5.65)
SODIUM SERPL-SCNC: 135 MMOL/L (ref 136–145)
SPECIMEN EXP DATE BLD: NORMAL
TIBC SERPL-MCNC: 426 UG/DL (ref 250–450)
UNIT DIVISION: 0
WBC # BLD AUTO: 12.7 K/UL (ref 4.6–13.2)

## 2023-07-19 PROCEDURE — 82728 ASSAY OF FERRITIN: CPT

## 2023-07-19 PROCEDURE — 80307 DRUG TEST PRSMV CHEM ANLYZR: CPT

## 2023-07-19 PROCEDURE — 85014 HEMATOCRIT: CPT

## 2023-07-19 PROCEDURE — 83540 ASSAY OF IRON: CPT

## 2023-07-19 PROCEDURE — 85025 COMPLETE CBC W/AUTO DIFF WBC: CPT

## 2023-07-19 PROCEDURE — 36415 COLL VENOUS BLD VENIPUNCTURE: CPT

## 2023-07-19 PROCEDURE — 85018 HEMOGLOBIN: CPT

## 2023-07-19 PROCEDURE — A4216 STERILE WATER/SALINE, 10 ML: HCPCS | Performed by: STUDENT IN AN ORGANIZED HEALTH CARE EDUCATION/TRAINING PROGRAM

## 2023-07-19 PROCEDURE — 83550 IRON BINDING TEST: CPT

## 2023-07-19 PROCEDURE — 6370000000 HC RX 637 (ALT 250 FOR IP): Performed by: HOSPITALIST

## 2023-07-19 PROCEDURE — 94761 N-INVAS EAR/PLS OXIMETRY MLT: CPT

## 2023-07-19 PROCEDURE — 6370000000 HC RX 637 (ALT 250 FOR IP): Performed by: INTERNAL MEDICINE

## 2023-07-19 PROCEDURE — 1100000000 HC RM PRIVATE

## 2023-07-19 PROCEDURE — 80048 BASIC METABOLIC PNL TOTAL CA: CPT

## 2023-07-19 PROCEDURE — C9113 INJ PANTOPRAZOLE SODIUM, VIA: HCPCS | Performed by: STUDENT IN AN ORGANIZED HEALTH CARE EDUCATION/TRAINING PROGRAM

## 2023-07-19 PROCEDURE — 2580000003 HC RX 258: Performed by: STUDENT IN AN ORGANIZED HEALTH CARE EDUCATION/TRAINING PROGRAM

## 2023-07-19 PROCEDURE — 6370000000 HC RX 637 (ALT 250 FOR IP): Performed by: STUDENT IN AN ORGANIZED HEALTH CARE EDUCATION/TRAINING PROGRAM

## 2023-07-19 PROCEDURE — 6360000002 HC RX W HCPCS: Performed by: STUDENT IN AN ORGANIZED HEALTH CARE EDUCATION/TRAINING PROGRAM

## 2023-07-19 PROCEDURE — 99233 SBSQ HOSP IP/OBS HIGH 50: CPT | Performed by: NURSE PRACTITIONER

## 2023-07-19 PROCEDURE — 2580000003 HC RX 258: Performed by: INTERNAL MEDICINE

## 2023-07-19 RX ORDER — AMLODIPINE BESYLATE 5 MG/1
5 TABLET ORAL DAILY
Status: DISCONTINUED | OUTPATIENT
Start: 2023-07-19 | End: 2023-07-21 | Stop reason: HOSPADM

## 2023-07-19 RX ORDER — CLONIDINE HYDROCHLORIDE 0.1 MG/1
0.1 TABLET ORAL 3 TIMES DAILY
Status: DISCONTINUED | OUTPATIENT
Start: 2023-07-19 | End: 2023-07-21 | Stop reason: HOSPADM

## 2023-07-19 RX ORDER — CLONIDINE HYDROCHLORIDE 0.1 MG/1
0.1 TABLET ORAL ONCE
Status: COMPLETED | OUTPATIENT
Start: 2023-07-19 | End: 2023-07-19

## 2023-07-19 RX ADMIN — SODIUM CHLORIDE: 900 INJECTION, SOLUTION INTRAVENOUS at 12:30

## 2023-07-19 RX ADMIN — SODIUM CHLORIDE, PRESERVATIVE FREE 10 ML: 5 INJECTION INTRAVENOUS at 22:31

## 2023-07-19 RX ADMIN — CLONIDINE HYDROCHLORIDE 0.1 MG: 0.1 TABLET ORAL at 17:00

## 2023-07-19 RX ADMIN — SODIUM CHLORIDE: 900 INJECTION, SOLUTION INTRAVENOUS at 22:36

## 2023-07-19 RX ADMIN — CLONIDINE HYDROCHLORIDE 0.1 MG: 0.1 TABLET ORAL at 15:19

## 2023-07-19 RX ADMIN — HYDROXYZINE PAMOATE 25 MG: 25 CAPSULE ORAL at 02:06

## 2023-07-19 RX ADMIN — SODIUM CHLORIDE, PRESERVATIVE FREE 10 ML: 5 INJECTION INTRAVENOUS at 08:58

## 2023-07-19 RX ADMIN — AMLODIPINE BESYLATE 5 MG: 5 TABLET ORAL at 08:55

## 2023-07-19 RX ADMIN — METHADONE HYDROCHLORIDE 115 MG: 10 CONCENTRATE ORAL at 08:55

## 2023-07-19 RX ADMIN — SODIUM CHLORIDE 40 MG: 9 INJECTION INTRAMUSCULAR; INTRAVENOUS; SUBCUTANEOUS at 05:03

## 2023-07-19 RX ADMIN — SODIUM CHLORIDE 40 MG: 9 INJECTION INTRAMUSCULAR; INTRAVENOUS; SUBCUTANEOUS at 17:00

## 2023-07-19 NOTE — PROGRESS NOTES
[facility] [facility] Hospitalist Group  Progress Note    Patient: Wiliam Lee Age: 51 y.o. : 1975 MR#: [id number]: [id number]  Date: 2023         Assessment/Plan:   Symptomatic Anemia possibly due to Aspirin Overuse - Induced Gastric Ulcer    - Transfused a total of 4 units this hospitalization, stable today    - Monitor HH every 8 hours     - Transfuse if Hgb < 7    - Holding anticoagulation     - Repeat tox screen, can have EGD once negative for cocaine.     - NPO after midnight tonight     Severe QT prolongation    - Continue to monitor     - Avoid QT prolonging agents    Hypertension    - Continue home antihypertensives     - Monitor BP    Hyperlipidemia    - Not on any home medications       Depression/ADD    - Not on any home medications     Opioid Addiction with Methadone Use    - Continue methadone     - UDS positive for cocaine     Tobacco Abuse    - Smoking Cessation advised     - Nicotine patch added     Anxiety    - Started on hydroxyzine (vistaril) PRN     - Avoid Benzos   Hospital Problems             Last Modified POA    * (Principal) Symptomatic anemia 2023 Yes    QT prolongation 2023 Yes    Overview Signed 2023  6:23 AM by Sampson Lipscomb DO     QTc Prolongation (646 ms) by EKG performed on 2023. Tobacco abuse (Chronic) 2023 Yes    Hypertension (Chronic) 2023 Yes    Hyperlipidemia (Chronic) 2023 Yes    Depression (Chronic) 2023 Yes    ADD (attention deficit disorder) (Chronic) 2023 Yes    Methadone use (Chronic) 2023 Yes    Overview Signed 2023  6:26 AM by Sampson Lipscomb DO     For Opioid Addiction (H/O Heroin Abuse). NSAID induced gastritis 2023 Yes    GI bleed 2023 Yes         DVT Prophylaxis:  []Lovenox  []Hep SQ  []SCDs  []Coumadin   []On Heparin gtt []PO anticoagulant Hold anticoagulation due to GIB    Anticipated discharge: > 2 days    Subjective:     Pt s/e @ bedside.  No major

## 2023-07-19 NOTE — PROGRESS NOTES
Patient became very anixious and pacing in early gave vistral to help him calm down he called his mother

## 2023-07-19 NOTE — PROGRESS NOTES
Patient did chlorhexidine bath for procedure understands he will be NPO for his procedure in am also understands he has to urine test before surgery

## 2023-07-19 NOTE — PROGRESS NOTES
Patient is alert and oriented itmes three with no sing sor ssymptoms of distress. No nusea or vomiting this shift. Patient is up and walking in early waiting  for brother to come.  Gave patient chlorhexadine wipes and educated him on use for his procedure in am

## 2023-07-20 LAB
AMPHET UR QL SCN: NEGATIVE
ANION GAP SERPL CALC-SCNC: 3 MMOL/L (ref 3–18)
BARBITURATES UR QL SCN: NEGATIVE
BASOPHILS # BLD: 0 K/UL (ref 0–0.1)
BASOPHILS NFR BLD: 0 % (ref 0–2)
BENZODIAZ UR QL: NEGATIVE
BUN SERPL-MCNC: 11 MG/DL (ref 7–18)
BUN/CREAT SERPL: 15 (ref 12–20)
CALCIUM SERPL-MCNC: 8.3 MG/DL (ref 8.5–10.1)
CANNABINOIDS UR QL SCN: NEGATIVE
CHLORIDE SERPL-SCNC: 105 MMOL/L (ref 100–111)
CO2 SERPL-SCNC: 31 MMOL/L (ref 21–32)
COCAINE UR QL SCN: POSITIVE
CREAT SERPL-MCNC: 0.74 MG/DL (ref 0.6–1.3)
DIFFERENTIAL METHOD BLD: ABNORMAL
EOSINOPHIL # BLD: 0.5 K/UL (ref 0–0.4)
EOSINOPHIL NFR BLD: 5 % (ref 0–5)
ERYTHROCYTE [DISTWIDTH] IN BLOOD BY AUTOMATED COUNT: 15.9 % (ref 11.6–14.5)
GLUCOSE SERPL-MCNC: 84 MG/DL (ref 74–99)
HCT VFR BLD AUTO: 24.5 % (ref 36–48)
HGB BLD-MCNC: 7.4 G/DL (ref 13–16)
IMM GRANULOCYTES # BLD AUTO: 0.2 K/UL (ref 0–0.04)
IMM GRANULOCYTES NFR BLD AUTO: 2 % (ref 0–0.5)
LYMPHOCYTES # BLD: 1.2 K/UL (ref 0.9–3.6)
LYMPHOCYTES NFR BLD: 12 % (ref 21–52)
Lab: ABNORMAL
MCH RBC QN AUTO: 26.4 PG (ref 24–34)
MCHC RBC AUTO-ENTMCNC: 30.2 G/DL (ref 31–37)
MCV RBC AUTO: 87.5 FL (ref 78–100)
METHADONE UR QL: POSITIVE
MONOCYTES # BLD: 1 K/UL (ref 0.05–1.2)
MONOCYTES NFR BLD: 11 % (ref 3–10)
NEUTS SEG # BLD: 6.8 K/UL (ref 1.8–8)
NEUTS SEG NFR BLD: 70 % (ref 40–73)
NRBC # BLD: 0.05 K/UL (ref 0–0.01)
NRBC BLD-RTO: 0.5 PER 100 WBC
OPIATES UR QL: NEGATIVE
PCP UR QL: NEGATIVE
PLATELET # BLD AUTO: 449 K/UL (ref 135–420)
PMV BLD AUTO: 9.1 FL (ref 9.2–11.8)
POTASSIUM SERPL-SCNC: 4 MMOL/L (ref 3.5–5.5)
RBC # BLD AUTO: 2.8 M/UL (ref 4.35–5.65)
SODIUM SERPL-SCNC: 139 MMOL/L (ref 136–145)
WBC # BLD AUTO: 9.7 K/UL (ref 4.6–13.2)

## 2023-07-20 PROCEDURE — 2580000003 HC RX 258: Performed by: INTERNAL MEDICINE

## 2023-07-20 PROCEDURE — 6370000000 HC RX 637 (ALT 250 FOR IP): Performed by: STUDENT IN AN ORGANIZED HEALTH CARE EDUCATION/TRAINING PROGRAM

## 2023-07-20 PROCEDURE — 99232 SBSQ HOSP IP/OBS MODERATE 35: CPT | Performed by: NURSE PRACTITIONER

## 2023-07-20 PROCEDURE — 80048 BASIC METABOLIC PNL TOTAL CA: CPT

## 2023-07-20 PROCEDURE — 6370000000 HC RX 637 (ALT 250 FOR IP): Performed by: INTERNAL MEDICINE

## 2023-07-20 PROCEDURE — 94761 N-INVAS EAR/PLS OXIMETRY MLT: CPT

## 2023-07-20 PROCEDURE — 6370000000 HC RX 637 (ALT 250 FOR IP): Performed by: HOSPITALIST

## 2023-07-20 PROCEDURE — 85025 COMPLETE CBC W/AUTO DIFF WBC: CPT

## 2023-07-20 PROCEDURE — C9113 INJ PANTOPRAZOLE SODIUM, VIA: HCPCS | Performed by: STUDENT IN AN ORGANIZED HEALTH CARE EDUCATION/TRAINING PROGRAM

## 2023-07-20 PROCEDURE — 6360000002 HC RX W HCPCS: Performed by: STUDENT IN AN ORGANIZED HEALTH CARE EDUCATION/TRAINING PROGRAM

## 2023-07-20 PROCEDURE — 2580000003 HC RX 258: Performed by: STUDENT IN AN ORGANIZED HEALTH CARE EDUCATION/TRAINING PROGRAM

## 2023-07-20 PROCEDURE — 36415 COLL VENOUS BLD VENIPUNCTURE: CPT

## 2023-07-20 PROCEDURE — 1100000000 HC RM PRIVATE

## 2023-07-20 PROCEDURE — A4216 STERILE WATER/SALINE, 10 ML: HCPCS | Performed by: STUDENT IN AN ORGANIZED HEALTH CARE EDUCATION/TRAINING PROGRAM

## 2023-07-20 PROCEDURE — 80307 DRUG TEST PRSMV CHEM ANLYZR: CPT

## 2023-07-20 RX ADMIN — SODIUM CHLORIDE 40 MG: 9 INJECTION INTRAMUSCULAR; INTRAVENOUS; SUBCUTANEOUS at 17:21

## 2023-07-20 RX ADMIN — CLONIDINE HYDROCHLORIDE 0.1 MG: 0.1 TABLET ORAL at 08:33

## 2023-07-20 RX ADMIN — CLONIDINE HYDROCHLORIDE 0.1 MG: 0.1 TABLET ORAL at 21:55

## 2023-07-20 RX ADMIN — AMLODIPINE BESYLATE 5 MG: 5 TABLET ORAL at 08:34

## 2023-07-20 RX ADMIN — METHADONE HYDROCHLORIDE 115 MG: 10 CONCENTRATE ORAL at 09:13

## 2023-07-20 RX ADMIN — CLONIDINE HYDROCHLORIDE 0.1 MG: 0.1 TABLET ORAL at 14:34

## 2023-07-20 RX ADMIN — SODIUM CHLORIDE: 900 INJECTION, SOLUTION INTRAVENOUS at 09:13

## 2023-07-20 RX ADMIN — SODIUM CHLORIDE: 900 INJECTION, SOLUTION INTRAVENOUS at 20:45

## 2023-07-20 RX ADMIN — SODIUM CHLORIDE, PRESERVATIVE FREE 10 ML: 5 INJECTION INTRAVENOUS at 21:06

## 2023-07-20 RX ADMIN — SODIUM CHLORIDE, PRESERVATIVE FREE 10 ML: 5 INJECTION INTRAVENOUS at 08:34

## 2023-07-20 RX ADMIN — SODIUM CHLORIDE 40 MG: 9 INJECTION INTRAMUSCULAR; INTRAVENOUS; SUBCUTANEOUS at 04:39

## 2023-07-20 ASSESSMENT — PAIN SCALES - GENERAL: PAINLEVEL_OUTOF10: 0

## 2023-07-20 NOTE — PROGRESS NOTES
[facility] Hospitalist Group  Progress Note    Patient: Manju Garcia Age: 51 y.o. : 1975 MR#: [id number]: [id number]  Date: 2023         Assessment/Plan:   Symptomatic Anemia possibly due to Aspirin Overuse - Induced Gastric Ulcer    - Transfused a total of 4 units this hospitalization, stable today    - Monitor HH every 8 hours     - Transfuse if Hgb < 7    - Holding anticoagulation     - Repeat tox screen, can have EGD once negative for cocaine. Last used cocaine and fentanyl 2023    - NPO after midnight tonight     Severe QT prolongation    - Continue to monitor     - Avoid QT prolonging agents    Hypertension    - Continue home antihypertensives     - Monitor BP    Hyperlipidemia    - Not on any home medications       Depression/ADD    - Not on any home medications     Opioid Addiction with Methadone Use    - Continue methadone     - UDS positive for cocaine     Tobacco Abuse    - Smoking Cessation advised     - Nicotine patch added     Anxiety    - Started on hydroxyzine (vistaril) PRN     - Avoid Benzos   Hospital Problems             Last Modified POA    * (Principal) Symptomatic anemia 2023 Yes    QT prolongation 2023 Yes    Overview Signed 2023  6:23 AM by Bud Doss DO     QTc Prolongation (646 ms) by EKG performed on 2023. Tobacco abuse (Chronic) 2023 Yes    Hypertension (Chronic) 2023 Yes    Hyperlipidemia (Chronic) 2023 Yes    Depression (Chronic) 2023 Yes    ADD (attention deficit disorder) (Chronic) 2023 Yes    Methadone use (Chronic) 2023 Yes    Overview Signed 2023  6:26 AM by Bud Doss DO     For Opioid Addiction (H/O Heroin Abuse).          NSAID induced gastritis 2023 Yes    GI bleed 2023 Yes       DVT Prophylaxis:  []Lovenox  []Hep SQ  []SCDs  []Coumadin   []On Heparin gtt []PO anticoagulant Hold anticoagulation due to GIB    Anticipated discharge: > 2 hour(s))   Hemoglobin and Hematocrit    Collection Time: 07/19/23  2:50 PM   Result Value Ref Range    Hemoglobin 7.7 (L) 13.0 - 16.0 g/dL    Hematocrit 24.8 (L) 36.0 - 48.0 %   Urine Drug Screen    Collection Time: 07/19/23  6:30 PM   Result Value Ref Range    Benzodiazepines, Urine Negative NEG      Barbiturates, Urine Negative NEG      THC, TH-Cannabinol, Urine Negative NEG      Opiates, Urine Negative NEG      PCP, Urine Negative NEG      Cocaine, Urine Positive (A) NEG      Amphetamine, Urine Negative NEG      Methadone, Urine Positive (A) NEG      Comments: (NOTE)    Hemoglobin and Hematocrit    Collection Time: 07/19/23  9:41 PM   Result Value Ref Range    Hemoglobin 7.0 (L) 13.0 - 16.0 g/dL    Hematocrit 23.2 (L) 36.0 - 48.0 %   CBC with Auto Differential    Collection Time: 07/20/23  3:31 AM   Result Value Ref Range    WBC 9.7 4.6 - 13.2 K/uL    RBC 2.80 (L) 4.35 - 5.65 M/uL    Hemoglobin 7.4 (L) 13.0 - 16.0 g/dL    Hematocrit 24.5 (L) 36.0 - 48.0 %    MCV 87.5 78.0 - 100.0 FL    MCH 26.4 24.0 - 34.0 PG    MCHC 30.2 (L) 31.0 - 37.0 g/dL    RDW 15.9 (H) 11.6 - 14.5 %    Platelets 911 (H) 178 - 420 K/uL    MPV 9.1 (L) 9.2 - 11.8 FL    Nucleated RBCs 0.5 (H) 0  WBC    nRBC 0.05 (H) 0.00 - 0.01 K/uL    Neutrophils % 70 40 - 73 %    Lymphocytes % 12 (L) 21 - 52 %    Monocytes % 11 (H) 3 - 10 %    Eosinophils % 5 0 - 5 %    Basophils % 0 0 - 2 %    Immature Granulocytes 2 (H) 0.0 - 0.5 %    Neutrophils Absolute 6.8 1.8 - 8.0 K/UL    Lymphocytes Absolute 1.2 0.9 - 3.6 K/UL    Monocytes Absolute 1.0 0.05 - 1.2 K/UL    Eosinophils Absolute 0.5 (H) 0.0 - 0.4 K/UL    Basophils Absolute 0.0 0.0 - 0.1 K/UL    Absolute Immature Granulocyte 0.2 (H) 0.00 - 0.04 K/UL    Differential Type AUTOMATED     Basic Metabolic Panel w/ Reflex to MG    Collection Time: 07/20/23  3:31 AM   Result Value Ref Range    Sodium 139 136 - 145 mmol/L    Potassium 4.0 3.5 - 5.5 mmol/L    Chloride 105 100 - 111 mmol/L    CO2 31 21 - 32

## 2023-07-20 NOTE — PROGRESS NOTES
EGD cancelled by anesthesia today due to + cocaine on UDS. Patient reports last use of cocaine and Fentanyl was 7/17/2023. Will attempt EGD tomorrow. OK for Diet as tolerated today then NPO after midnight. Continue PPI  Hold anticoagulation  Monitor h/h, transfuse per protocol.

## 2023-07-21 ENCOUNTER — ANESTHESIA EVENT (OUTPATIENT)
Facility: HOSPITAL | Age: 48
End: 2023-07-21
Payer: MEDICAID

## 2023-07-21 ENCOUNTER — ANESTHESIA (OUTPATIENT)
Facility: HOSPITAL | Age: 48
End: 2023-07-21
Payer: MEDICAID

## 2023-07-21 VITALS
SYSTOLIC BLOOD PRESSURE: 123 MMHG | OXYGEN SATURATION: 98 % | WEIGHT: 210 LBS | BODY MASS INDEX: 27.83 KG/M2 | TEMPERATURE: 99.1 F | HEART RATE: 79 BPM | DIASTOLIC BLOOD PRESSURE: 74 MMHG | RESPIRATION RATE: 17 BRPM | HEIGHT: 73 IN

## 2023-07-21 LAB
AMPHET UR QL SCN: NEGATIVE
ANION GAP SERPL CALC-SCNC: 3 MMOL/L (ref 3–18)
BARBITURATES UR QL SCN: NEGATIVE
BASOPHILS # BLD: 0 K/UL (ref 0–0.1)
BASOPHILS NFR BLD: 0 % (ref 0–2)
BENZODIAZ UR QL: NEGATIVE
BUN SERPL-MCNC: 9 MG/DL (ref 7–18)
BUN/CREAT SERPL: 12 (ref 12–20)
CALCIUM SERPL-MCNC: 8.2 MG/DL (ref 8.5–10.1)
CANNABINOIDS UR QL SCN: NEGATIVE
CHLORIDE SERPL-SCNC: 106 MMOL/L (ref 100–111)
CO2 SERPL-SCNC: 32 MMOL/L (ref 21–32)
COCAINE UR QL SCN: POSITIVE
CREAT SERPL-MCNC: 0.78 MG/DL (ref 0.6–1.3)
DIFFERENTIAL METHOD BLD: ABNORMAL
EOSINOPHIL # BLD: 0.6 K/UL (ref 0–0.4)
EOSINOPHIL NFR BLD: 7 % (ref 0–5)
ERYTHROCYTE [DISTWIDTH] IN BLOOD BY AUTOMATED COUNT: 15.8 % (ref 11.6–14.5)
GLUCOSE SERPL-MCNC: 77 MG/DL (ref 74–99)
HCT VFR BLD AUTO: 23.2 % (ref 36–48)
HGB BLD-MCNC: 7 G/DL (ref 13–16)
IMM GRANULOCYTES # BLD AUTO: 0 K/UL (ref 0–0.04)
IMM GRANULOCYTES NFR BLD AUTO: 0 % (ref 0–0.5)
LYMPHOCYTES # BLD: 1.4 K/UL (ref 0.9–3.6)
LYMPHOCYTES NFR BLD: 15 % (ref 21–52)
Lab: ABNORMAL
MCH RBC QN AUTO: 26.2 PG (ref 24–34)
MCHC RBC AUTO-ENTMCNC: 30.2 G/DL (ref 31–37)
MCV RBC AUTO: 86.9 FL (ref 78–100)
METHADONE UR QL: POSITIVE
MONOCYTES # BLD: 0.9 K/UL (ref 0.05–1.2)
MONOCYTES NFR BLD: 10 % (ref 3–10)
NEUTS SEG # BLD: 6.3 K/UL (ref 1.8–8)
NEUTS SEG NFR BLD: 68 % (ref 40–73)
NRBC # BLD: 0.02 K/UL (ref 0–0.01)
NRBC BLD-RTO: 0.2 PER 100 WBC
OPIATES UR QL: NEGATIVE
PCP UR QL: NEGATIVE
PLATELET # BLD AUTO: 467 K/UL (ref 135–420)
PMV BLD AUTO: 9.3 FL (ref 9.2–11.8)
POTASSIUM SERPL-SCNC: 4.3 MMOL/L (ref 3.5–5.5)
RBC # BLD AUTO: 2.67 M/UL (ref 4.35–5.65)
SODIUM SERPL-SCNC: 141 MMOL/L (ref 136–145)
WBC # BLD AUTO: 9.3 K/UL (ref 4.6–13.2)

## 2023-07-21 PROCEDURE — 36415 COLL VENOUS BLD VENIPUNCTURE: CPT

## 2023-07-21 PROCEDURE — 94761 N-INVAS EAR/PLS OXIMETRY MLT: CPT

## 2023-07-21 PROCEDURE — 85025 COMPLETE CBC W/AUTO DIFF WBC: CPT

## 2023-07-21 PROCEDURE — 6370000000 HC RX 637 (ALT 250 FOR IP): Performed by: INTERNAL MEDICINE

## 2023-07-21 PROCEDURE — 80307 DRUG TEST PRSMV CHEM ANLYZR: CPT

## 2023-07-21 PROCEDURE — 6370000000 HC RX 637 (ALT 250 FOR IP): Performed by: STUDENT IN AN ORGANIZED HEALTH CARE EDUCATION/TRAINING PROGRAM

## 2023-07-21 PROCEDURE — 2580000003 HC RX 258: Performed by: INTERNAL MEDICINE

## 2023-07-21 PROCEDURE — 99239 HOSP IP/OBS DSCHRG MGMT >30: CPT | Performed by: NURSE PRACTITIONER

## 2023-07-21 PROCEDURE — 80048 BASIC METABOLIC PNL TOTAL CA: CPT

## 2023-07-21 PROCEDURE — 6370000000 HC RX 637 (ALT 250 FOR IP): Performed by: HOSPITALIST

## 2023-07-21 RX ORDER — LANOLIN ALCOHOL/MO/W.PET/CERES
325 CREAM (GRAM) TOPICAL 2 TIMES DAILY
Qty: 60 TABLET | Refills: 0 | Status: SHIPPED | OUTPATIENT
Start: 2023-07-21

## 2023-07-21 RX ORDER — CLONIDINE HYDROCHLORIDE 0.1 MG/1
0.1 TABLET ORAL 3 TIMES DAILY
Qty: 60 TABLET | Refills: 0 | Status: SHIPPED | OUTPATIENT
Start: 2023-07-21

## 2023-07-21 RX ADMIN — AMLODIPINE BESYLATE 5 MG: 5 TABLET ORAL at 08:18

## 2023-07-21 RX ADMIN — METHADONE HYDROCHLORIDE 115 MG: 10 CONCENTRATE ORAL at 08:19

## 2023-07-21 RX ADMIN — HYDROXYZINE PAMOATE 25 MG: 25 CAPSULE ORAL at 08:17

## 2023-07-21 RX ADMIN — CLONIDINE HYDROCHLORIDE 0.1 MG: 0.1 TABLET ORAL at 08:18

## 2023-07-21 RX ADMIN — SODIUM CHLORIDE: 900 INJECTION, SOLUTION INTRAVENOUS at 05:22

## 2023-07-21 ASSESSMENT — PAIN SCALES - GENERAL
PAINLEVEL_OUTOF10: 0
PAINLEVEL_OUTOF10: 0

## 2023-07-21 NOTE — PROGRESS NOTES
and S2, no murmurs, rubs, clicks or gallops  Abdomen: soft, non-tender, non-distended, normal bowel sounds, no masses or organomegaly        Intake/Output Summary (Last 24 hours) at 7/21/2023 0844  Last data filed at 7/20/2023 1238  Gross per 24 hour   Intake 480 ml   Output --   Net 480 ml       CBC w/Diff    Lab Results   Component Value Date/Time    WBC 9.3 07/21/2023 05:44 AM    RBC 2.67 (L) 07/21/2023 05:44 AM    HGB 7.0 (L) 07/21/2023 05:44 AM    HCT 23.2 (L) 07/21/2023 05:44 AM    MCV 86.9 07/21/2023 05:44 AM    MCH 26.2 07/21/2023 05:44 AM    MCHC 30.2 (L) 07/21/2023 05:44 AM    RDW 15.8 (H) 07/21/2023 05:44 AM     (H) 07/21/2023 05:44 AM    MPV 9.3 07/21/2023 05:44 AM    No results found for: MONO, BASO, BANDS, METAS, PRO, BLAST   Basic Metabolic Profile   Recent Labs     07/21/23  0544      K 4.3      CO2 32   BUN 9   GLUCOSE 77        Hepatic Function    No results found for: ALB, TP No components found for: SGOT, GPT, DBILI, TBIL       Coags   No results for input(s): INR, APTT in the last 72 hours. Invalid input(s): JB Robin, [address] [facility]  Phone: [phone]

## 2023-07-21 NOTE — PERIOP NOTE
TRANSFER - IN REPORT:    Verbal report received from [facility] on Syed Dadds  being received from 202 for ordered procedure      Report consisted of patient's Situation, Background, Assessment and   Recommendations(SBAR). Information from the following report(s) Nurse Handoff Report was reviewed with the receiving nurse. Opportunity for questions and clarification was provided. Assessment completed upon patient's arrival to unit and care assumed. Patient positive for cocaine.  GI MD and anesthesia needs to be aware before putting in for transport

## 2023-07-21 NOTE — DISCHARGE INSTRUCTIONS
DISCHARGE SUMMARY from Nurse    PATIENT INSTRUCTIONS:    After general anesthesia or intravenous sedation, for 24 hours or while taking prescription Narcotics:  Limit your activities  Do not drive and operate hazardous machinery  Do not make important personal or business decisions  Do  not drink alcoholic beverages  If you have not urinated within 8 hours after discharge, please contact your surgeon on call. Report the following to your surgeon:  Excessive pain, swelling, redness or odor of or around the surgical area  Temperature over 100.5  Nausea and vomiting lasting longer than 4 hours or if unable to take medications  Any signs of decreased circulation or nerve impairment to extremity: change in color, persistent  numbness, tingling, coldness or increase pain  Any questions    What to do at Home:  Recommended activity: activity as tolerated, rest as needed     If you experience any of the following symptoms chest pain, shortness of breath, dizziness/fainting , bloody or black stool, vomit that looks coffee grounds please follow up with Primary Care Provider or go to the nearest Emergency Room. *  Please give a list of your current medications to your Primary Care Provider. *  Please update this list whenever your medications are discontinued, doses are      changed, or new medications (including over-the-counter products) are added. *  Please carry medication information at all times in case of emergency situations. These are general instructions for a healthy lifestyle:    No smoking/ No tobacco products/ Avoid exposure to second hand smoke  Surgeon General's Warning:  Quitting smoking now greatly reduces serious risk to your health.     Obesity, smoking, and sedentary lifestyle greatly increases your risk for illness    A healthy diet, regular physical exercise & weight monitoring are important for maintaining a healthy lifestyle    You may be retaining fluid if you have a history of heart

## 2023-07-21 NOTE — DISCHARGE SUMMARY
[facility] [facility] Hospitalist Group    Discharge Summary    Patient: Sulma Patel MRN: [id number]  CSN: [id number]    YOB: 1975  Age: 51 y.o. Sex: male    DOA: 7/17/2023 LOS:  LOS: 4 days   Discharge Date:      Admission Diagnoses: Symptomatic anemia [D64.9]  Gastrointestinal hemorrhage, unspecified gastrointestinal hemorrhage type [K92.2]  GI bleed [K92.2]    Discharge Diagnoses:    Hospital Problems             Last Modified POA    * (Principal) Symptomatic anemia 7/17/2023 Yes    QT prolongation 7/17/2023 Yes    Overview Signed 7/17/2023  6:23 AM by Spenser De La Garza DO     QTc Prolongation (646 ms) by EKG performed on 7/17/2023. Tobacco abuse (Chronic) 7/17/2023 Yes    Hypertension (Chronic) 7/17/2023 Yes    Hyperlipidemia (Chronic) 7/17/2023 Yes    Depression (Chronic) 7/17/2023 Yes    ADD (attention deficit disorder) (Chronic) 7/17/2023 Yes    Methadone use (Chronic) 7/17/2023 Yes    Overview Signed 7/17/2023  6:26 AM by Spenser De La Garza DO     For Opioid Addiction (H/O Heroin Abuse). NSAID induced gastritis 7/17/2023 Yes    GI bleed 7/18/2023 Yes     Symptomatic Anemia possibly due to Aspirin Overuse - Induced Gastric Ulcer    - Transfused a total of 4 units this hospitalization    - Holding anticoagulation     - Repeat tox screen, can have EGD once negative for cocaine.  Last used cocaine and fentanyl 7/17/2023     Severe QT prolongation    - Avoid QT prolonging agents     Hypertension    - Continue home antihypertensives     - Monitor BP     Hyperlipidemia    - Not on any home medications       Depression/ADD    - Not on any home medications      Opioid Addiction with Methadone Use    - Continue methadone     - UDS positive for cocaine      Tobacco Abuse    - Smoking Cessation advised     - Nicotine patch added      Anxiety    - Started on hydroxyzine (vistaril) PRN while hospitalized     - Avoid Benzos     Discharge Condition: Stable    Discharge To: NP Nurse Practitioner Follow up in 1 week(s)  [facility] [address] MD Gastroenterology Follow up in 1 day(s)  [address],[place] [id number]               Discharge time: >30 minutes spent coordinating this discharge    Katty West, MSN, FNP-BC, AGACNP-BC  [facility]   7/21/2023, 9:19 AM

## 2024-08-11 ENCOUNTER — HOSPITAL ENCOUNTER (EMERGENCY)
Facility: HOSPITAL | Age: 49
Discharge: HOME | End: 2024-08-11
Attending: STUDENT IN AN ORGANIZED HEALTH CARE EDUCATION/TRAINING PROGRAM
Payer: MEDICAID

## 2024-08-11 ENCOUNTER — APPOINTMENT (OUTPATIENT)
Facility: HOSPITAL | Age: 49
End: 2024-08-11
Payer: MEDICAID

## 2024-08-11 VITALS
SYSTOLIC BLOOD PRESSURE: 112 MMHG | HEART RATE: 81 BPM | WEIGHT: 240 LBS | OXYGEN SATURATION: 99 % | TEMPERATURE: 98.8 F | RESPIRATION RATE: 18 BRPM | BODY MASS INDEX: 31.81 KG/M2 | DIASTOLIC BLOOD PRESSURE: 66 MMHG | HEIGHT: 73 IN

## 2024-08-11 DIAGNOSIS — R10.84 GENERALIZED ABDOMINAL PAIN: ICD-10-CM

## 2024-08-11 DIAGNOSIS — R19.7 NAUSEA VOMITING AND DIARRHEA: Primary | ICD-10-CM

## 2024-08-11 DIAGNOSIS — K52.9 GASTROENTERITIS: ICD-10-CM

## 2024-08-11 DIAGNOSIS — R11.2 NAUSEA VOMITING AND DIARRHEA: Primary | ICD-10-CM

## 2024-08-11 LAB
ALBUMIN SERPL-MCNC: 4 G/DL (ref 3.4–5)
ALBUMIN/GLOB SERPL: 1 (ref 0.8–1.7)
ALP SERPL-CCNC: 190 U/L (ref 45–117)
ALT SERPL-CCNC: 20 U/L (ref 16–61)
ANION GAP SERPL CALC-SCNC: 8 MMOL/L (ref 3–18)
AST SERPL-CCNC: 10 U/L (ref 10–38)
BASOPHILS # BLD: 0 K/UL (ref 0–0.1)
BASOPHILS NFR BLD: 0 % (ref 0–2)
BILIRUB SERPL-MCNC: 0.2 MG/DL (ref 0.2–1)
BUN SERPL-MCNC: 11 MG/DL (ref 7–18)
BUN/CREAT SERPL: 11 (ref 12–20)
CALCIUM SERPL-MCNC: 9.5 MG/DL (ref 8.5–10.1)
CHLORIDE SERPL-SCNC: 101 MMOL/L (ref 100–111)
CO2 SERPL-SCNC: 26 MMOL/L (ref 21–32)
CREAT SERPL-MCNC: 1.03 MG/DL (ref 0.6–1.3)
DIFFERENTIAL METHOD BLD: ABNORMAL
EOSINOPHIL # BLD: 0.2 K/UL (ref 0–0.4)
EOSINOPHIL NFR BLD: 2 % (ref 0–5)
ERYTHROCYTE [DISTWIDTH] IN BLOOD BY AUTOMATED COUNT: 14.1 % (ref 11.6–14.5)
GLOBULIN SER CALC-MCNC: 3.9 G/DL (ref 2–4)
GLUCOSE SERPL-MCNC: 128 MG/DL (ref 74–99)
HCT VFR BLD AUTO: 43.4 % (ref 36–48)
HGB BLD-MCNC: 14 G/DL (ref 13–16)
IMM GRANULOCYTES # BLD AUTO: 0.1 K/UL (ref 0–0.04)
IMM GRANULOCYTES NFR BLD AUTO: 1 % (ref 0–0.5)
LIPASE SERPL-CCNC: 37 U/L (ref 13–75)
LYMPHOCYTES # BLD: 2 K/UL (ref 0.9–3.6)
LYMPHOCYTES NFR BLD: 16 % (ref 21–52)
MCH RBC QN AUTO: 27 PG (ref 24–34)
MCHC RBC AUTO-ENTMCNC: 32.3 G/DL (ref 31–37)
MCV RBC AUTO: 83.6 FL (ref 78–100)
MONOCYTES # BLD: 0.9 K/UL (ref 0.05–1.2)
MONOCYTES NFR BLD: 7 % (ref 3–10)
NEUTS SEG # BLD: 9.5 K/UL (ref 1.8–8)
NEUTS SEG NFR BLD: 74 % (ref 40–73)
NRBC # BLD: 0 K/UL (ref 0–0.01)
NRBC BLD-RTO: 0 PER 100 WBC
PLATELET # BLD AUTO: 440 K/UL (ref 135–420)
PMV BLD AUTO: 9.2 FL (ref 9.2–11.8)
POTASSIUM SERPL-SCNC: 3.8 MMOL/L (ref 3.5–5.5)
PROT SERPL-MCNC: 7.9 G/DL (ref 6.4–8.2)
RBC # BLD AUTO: 5.19 M/UL (ref 4.35–5.65)
SODIUM SERPL-SCNC: 135 MMOL/L (ref 136–145)
TROPONIN I SERPL HS-MCNC: 5 NG/L (ref 0–78)
WBC # BLD AUTO: 12.8 K/UL (ref 4.6–13.2)

## 2024-08-11 PROCEDURE — 96361 HYDRATE IV INFUSION ADD-ON: CPT

## 2024-08-11 PROCEDURE — 83690 ASSAY OF LIPASE: CPT

## 2024-08-11 PROCEDURE — 99285 EMERGENCY DEPT VISIT HI MDM: CPT

## 2024-08-11 PROCEDURE — 6360000004 HC RX CONTRAST MEDICATION

## 2024-08-11 PROCEDURE — 2580000003 HC RX 258

## 2024-08-11 PROCEDURE — 80053 COMPREHEN METABOLIC PANEL: CPT

## 2024-08-11 PROCEDURE — 93005 ELECTROCARDIOGRAM TRACING: CPT | Performed by: STUDENT IN AN ORGANIZED HEALTH CARE EDUCATION/TRAINING PROGRAM

## 2024-08-11 PROCEDURE — 6370000000 HC RX 637 (ALT 250 FOR IP)

## 2024-08-11 PROCEDURE — 84484 ASSAY OF TROPONIN QUANT: CPT

## 2024-08-11 PROCEDURE — 85025 COMPLETE CBC W/AUTO DIFF WBC: CPT

## 2024-08-11 PROCEDURE — 6360000002 HC RX W HCPCS

## 2024-08-11 PROCEDURE — 74177 CT ABD & PELVIS W/CONTRAST: CPT

## 2024-08-11 PROCEDURE — 96374 THER/PROPH/DIAG INJ IV PUSH: CPT

## 2024-08-11 RX ORDER — QUETIAPINE FUMARATE 50 MG/1
50 TABLET, FILM COATED ORAL DAILY
Qty: 30 TABLET | Refills: 0 | Status: SHIPPED | OUTPATIENT
Start: 2024-08-11 | End: 2024-09-10

## 2024-08-11 RX ORDER — QUETIAPINE FUMARATE 25 MG/1
50 TABLET, FILM COATED ORAL
Status: COMPLETED | OUTPATIENT
Start: 2024-08-11 | End: 2024-08-11

## 2024-08-11 RX ORDER — KETOROLAC TROMETHAMINE 10 MG/1
10 TABLET, FILM COATED ORAL EVERY 6 HOURS PRN
Qty: 15 TABLET | Refills: 0 | Status: SHIPPED | OUTPATIENT
Start: 2024-08-11

## 2024-08-11 RX ORDER — ONDANSETRON 4 MG/1
4 TABLET, ORALLY DISINTEGRATING ORAL 3 TIMES DAILY PRN
Qty: 21 TABLET | Refills: 0 | Status: SHIPPED | OUTPATIENT
Start: 2024-08-11

## 2024-08-11 RX ORDER — ONDANSETRON 2 MG/ML
4 INJECTION INTRAMUSCULAR; INTRAVENOUS
Status: COMPLETED | OUTPATIENT
Start: 2024-08-11 | End: 2024-08-11

## 2024-08-11 RX ORDER — DICYCLOMINE HYDROCHLORIDE 10 MG/1
20 CAPSULE ORAL 4 TIMES DAILY
Qty: 21 CAPSULE | Refills: 0 | Status: SHIPPED | OUTPATIENT
Start: 2024-08-11

## 2024-08-11 RX ORDER — FAMOTIDINE 20 MG/1
20 TABLET, FILM COATED ORAL 2 TIMES DAILY
Qty: 24 TABLET | Refills: 1 | Status: SHIPPED | OUTPATIENT
Start: 2024-08-11

## 2024-08-11 RX ORDER — 0.9 % SODIUM CHLORIDE 0.9 %
500 INTRAVENOUS SOLUTION INTRAVENOUS ONCE
Status: COMPLETED | OUTPATIENT
Start: 2024-08-11 | End: 2024-08-11

## 2024-08-11 RX ADMIN — SODIUM CHLORIDE 500 ML: 9 INJECTION, SOLUTION INTRAVENOUS at 02:33

## 2024-08-11 RX ADMIN — ONDANSETRON 4 MG: 2 INJECTION INTRAMUSCULAR; INTRAVENOUS at 02:33

## 2024-08-11 RX ADMIN — QUETIAPINE FUMARATE 50 MG: 25 TABLET ORAL at 02:33

## 2024-08-11 RX ADMIN — IOPAMIDOL 100 ML: 612 INJECTION, SOLUTION INTRAVENOUS at 02:55

## 2024-08-11 NOTE — ED PROVIDER NOTES
[facility] EMERGENCY DEPT  EMERGENCY DEPARTMENT ENCOUNTER      Pt Name: Jocelyn Rajan  MRN: [id number]  Birthdate 1975  Date of evaluation: 8/11/2024  Provider: KIP Celaya  2:56 AM    CHIEF COMPLAINT       Chief Complaint   Patient presents with    Emesis    Diarrhea         HISTORY OF PRESENT ILLNESS    Jocelyn Rajan is a 49 y.o. male who presents to the emergency department abdominal pain, N/V/D x 3 days.     49-year-old male presents to ED with abdominal pain, nausea, vomiting, diarrhea x 3 days.  Patient provides that he ran out of a few of his medications including Zofran, magnesium, Seroquel 2 weeks ago.  He was able to get his medications filled yesterday but symptoms are not improving.  Patient is currently on methadone and provides he has been clean for 3 months.  He denies any known fever or chills.  He denies any cough or shortness of breath or chest pain.  He has not take anything for symptoms.  He reports history of similar abdominal pain 2 years ago and was told he had a hole in his stomach that needed surgery.  He never got surgery and the problem resolved on its own.  He denies any constipation prior to onset of nausea vomiting diarrhea. Pt denies any SI/HI or a/v hallucinations.     The history is provided by the patient.       Nursing Notes were reviewed.    REVIEW OF SYSTEMS       Review of Systems   Constitutional:  Negative for activity change, chills, fatigue and fever.   HENT:  Negative for congestion, ear pain, rhinorrhea, sore throat, trouble swallowing and voice change.    Eyes:  Negative for visual disturbance.   Respiratory:  Negative for cough, chest tightness and shortness of breath.    Cardiovascular:  Negative for chest pain, palpitations and leg swelling.   Gastrointestinal:  Positive for abdominal pain, diarrhea, nausea and vomiting. Negative for blood in stool, constipation and rectal pain.   Endocrine: Negative.    Genitourinary:  Negative for difficulty urinating, dysuria,

## 2024-08-11 NOTE — ED TRIAGE NOTES
Patient comes in stating that he has been having diarrhea and vomiting for 3 days straight and states that his heart has been beating funny.

## 2024-08-12 LAB
EKG ATRIAL RATE: 93 BPM
EKG DIAGNOSIS: NORMAL
EKG P AXIS: 10 DEGREES
EKG P-R INTERVAL: 162 MS
EKG Q-T INTERVAL: 374 MS
EKG QRS DURATION: 96 MS
EKG QTC CALCULATION (BAZETT): 465 MS
EKG R AXIS: -16 DEGREES
EKG T AXIS: 117 DEGREES
EKG VENTRICULAR RATE: 93 BPM

## 2024-08-12 PROCEDURE — 93010 ELECTROCARDIOGRAM REPORT: CPT | Performed by: INTERNAL MEDICINE

## 2024-11-05 ENCOUNTER — APPOINTMENT (OUTPATIENT)
Facility: HOSPITAL | Age: 49
End: 2024-11-05
Payer: MEDICAID

## 2024-11-05 ENCOUNTER — HOSPITAL ENCOUNTER (EMERGENCY)
Facility: HOSPITAL | Age: 49
Discharge: HOME | End: 2024-11-05
Payer: MEDICAID

## 2024-11-05 VITALS
HEART RATE: 89 BPM | TEMPERATURE: 99.1 F | RESPIRATION RATE: 16 BRPM | HEIGHT: 73 IN | WEIGHT: 242.3 LBS | OXYGEN SATURATION: 96 % | DIASTOLIC BLOOD PRESSURE: 76 MMHG | BODY MASS INDEX: 32.11 KG/M2 | SYSTOLIC BLOOD PRESSURE: 129 MMHG

## 2024-11-05 DIAGNOSIS — R19.7 NAUSEA VOMITING AND DIARRHEA: ICD-10-CM

## 2024-11-05 DIAGNOSIS — R10.84 GENERALIZED ABDOMINAL PAIN: Primary | ICD-10-CM

## 2024-11-05 DIAGNOSIS — I51.7 ATRIAL ENLARGEMENT, LEFT: ICD-10-CM

## 2024-11-05 DIAGNOSIS — I51.7 CARDIOMEGALY: ICD-10-CM

## 2024-11-05 DIAGNOSIS — I51.7 LEFT VENTRICULAR HYPERTROPHY: ICD-10-CM

## 2024-11-05 DIAGNOSIS — R11.2 NAUSEA VOMITING AND DIARRHEA: ICD-10-CM

## 2024-11-05 LAB
ALBUMIN SERPL-MCNC: 3.4 G/DL (ref 3.4–5)
ALBUMIN/GLOB SERPL: 0.7 (ref 0.8–1.7)
ALP SERPL-CCNC: 165 U/L (ref 45–117)
ALT SERPL-CCNC: 23 U/L (ref 16–61)
ANION GAP SERPL CALC-SCNC: 6 MMOL/L (ref 3–18)
APPEARANCE UR: CLEAR
AST SERPL-CCNC: 26 U/L (ref 10–38)
BASOPHILS # BLD: 0 K/UL (ref 0–0.1)
BASOPHILS NFR BLD: 0 % (ref 0–2)
BILIRUB SERPL-MCNC: 0.4 MG/DL (ref 0.2–1)
BILIRUB UR QL: NEGATIVE
BUN SERPL-MCNC: 8 MG/DL (ref 7–18)
BUN/CREAT SERPL: 8 (ref 12–20)
CALCIUM SERPL-MCNC: 9.7 MG/DL (ref 8.5–10.1)
CHLORIDE SERPL-SCNC: 98 MMOL/L (ref 100–111)
CO2 SERPL-SCNC: 30 MMOL/L (ref 21–32)
COLOR UR: YELLOW
CREAT SERPL-MCNC: 1.06 MG/DL (ref 0.6–1.3)
DIFFERENTIAL METHOD BLD: ABNORMAL
EOSINOPHIL # BLD: 0.2 K/UL (ref 0–0.4)
EOSINOPHIL NFR BLD: 2 % (ref 0–5)
ERYTHROCYTE [DISTWIDTH] IN BLOOD BY AUTOMATED COUNT: 15.5 % (ref 11.6–14.5)
GLOBULIN SER CALC-MCNC: 4.6 G/DL (ref 2–4)
GLUCOSE SERPL-MCNC: 108 MG/DL (ref 74–99)
GLUCOSE UR STRIP.AUTO-MCNC: NEGATIVE MG/DL
HCT VFR BLD AUTO: 37.7 % (ref 36–48)
HGB BLD-MCNC: 12.1 G/DL (ref 13–16)
HGB UR QL STRIP: NEGATIVE
IMM GRANULOCYTES # BLD AUTO: 0.1 K/UL (ref 0–0.04)
IMM GRANULOCYTES NFR BLD AUTO: 0 % (ref 0–0.5)
KETONES UR QL STRIP.AUTO: NEGATIVE MG/DL
LACTATE BLD-SCNC: 1.95 MMOL/L (ref 0.4–2)
LEUKOCYTE ESTERASE UR QL STRIP.AUTO: NEGATIVE
LIPASE SERPL-CCNC: 11 U/L (ref 13–75)
LYMPHOCYTES # BLD: 1.5 K/UL (ref 0.9–3.6)
LYMPHOCYTES NFR BLD: 10 % (ref 21–52)
MCH RBC QN AUTO: 27.8 PG (ref 24–34)
MCHC RBC AUTO-ENTMCNC: 32.1 G/DL (ref 31–37)
MCV RBC AUTO: 86.7 FL (ref 78–100)
MONOCYTES # BLD: 1.1 K/UL (ref 0.05–1.2)
MONOCYTES NFR BLD: 8 % (ref 3–10)
NEUTS SEG # BLD: 11.6 K/UL (ref 1.8–8)
NEUTS SEG NFR BLD: 80 % (ref 40–73)
NITRITE UR QL STRIP.AUTO: NEGATIVE
NRBC # BLD: 0 K/UL (ref 0–0.01)
NRBC BLD-RTO: 0 PER 100 WBC
PH UR STRIP: 6.5 (ref 5–8)
PLATELET # BLD AUTO: 365 K/UL (ref 135–420)
PMV BLD AUTO: 9.2 FL (ref 9.2–11.8)
POTASSIUM SERPL-SCNC: 3.8 MMOL/L (ref 3.5–5.5)
PROCALCITONIN SERPL-MCNC: 0.12 NG/ML
PROT SERPL-MCNC: 8 G/DL (ref 6.4–8.2)
PROT UR STRIP-MCNC: NEGATIVE MG/DL
RBC # BLD AUTO: 4.35 M/UL (ref 4.35–5.65)
SODIUM SERPL-SCNC: 134 MMOL/L (ref 136–145)
SP GR UR REFRACTOMETRY: 1.02 (ref 1–1.03)
UROBILINOGEN UR QL STRIP.AUTO: 1 EU/DL (ref 0.2–1)
WBC # BLD AUTO: 14.5 K/UL (ref 4.6–13.2)

## 2024-11-05 PROCEDURE — 2580000003 HC RX 258

## 2024-11-05 PROCEDURE — 87040 BLOOD CULTURE FOR BACTERIA: CPT

## 2024-11-05 PROCEDURE — 6360000004 HC RX CONTRAST MEDICATION

## 2024-11-05 PROCEDURE — 74177 CT ABD & PELVIS W/CONTRAST: CPT

## 2024-11-05 PROCEDURE — 80053 COMPREHEN METABOLIC PANEL: CPT

## 2024-11-05 PROCEDURE — 83690 ASSAY OF LIPASE: CPT

## 2024-11-05 PROCEDURE — 84145 PROCALCITONIN (PCT): CPT

## 2024-11-05 PROCEDURE — 96375 TX/PRO/DX INJ NEW DRUG ADDON: CPT

## 2024-11-05 PROCEDURE — 87086 URINE CULTURE/COLONY COUNT: CPT

## 2024-11-05 PROCEDURE — 99285 EMERGENCY DEPT VISIT HI MDM: CPT

## 2024-11-05 PROCEDURE — 81003 URINALYSIS AUTO W/O SCOPE: CPT

## 2024-11-05 PROCEDURE — 83605 ASSAY OF LACTIC ACID: CPT

## 2024-11-05 PROCEDURE — 6360000002 HC RX W HCPCS

## 2024-11-05 PROCEDURE — 85025 COMPLETE CBC W/AUTO DIFF WBC: CPT

## 2024-11-05 PROCEDURE — 96365 THER/PROPH/DIAG IV INF INIT: CPT

## 2024-11-05 RX ORDER — IOPAMIDOL 612 MG/ML
100 INJECTION, SOLUTION INTRAVASCULAR
Status: COMPLETED | OUTPATIENT
Start: 2024-11-05 | End: 2024-11-05

## 2024-11-05 RX ORDER — ONDANSETRON 4 MG/1
4 TABLET, ORALLY DISINTEGRATING ORAL 3 TIMES DAILY PRN
Qty: 21 TABLET | Refills: 0 | Status: SHIPPED | OUTPATIENT
Start: 2024-11-05

## 2024-11-05 RX ORDER — ONDANSETRON 2 MG/ML
4 INJECTION INTRAMUSCULAR; INTRAVENOUS
Status: COMPLETED | OUTPATIENT
Start: 2024-11-05 | End: 2024-11-05

## 2024-11-05 RX ADMIN — PIPERACILLIN AND TAZOBACTAM 4500 MG: 4; .5 INJECTION, POWDER, FOR SOLUTION INTRAVENOUS at 18:36

## 2024-11-05 RX ADMIN — ONDANSETRON 4 MG: 2 INJECTION INTRAMUSCULAR; INTRAVENOUS at 17:06

## 2024-11-05 RX ADMIN — IOPAMIDOL 100 ML: 612 INJECTION, SOLUTION INTRAVENOUS at 18:56

## 2024-11-05 ASSESSMENT — PAIN DESCRIPTION - LOCATION: LOCATION: GENERALIZED

## 2024-11-05 ASSESSMENT — PAIN - FUNCTIONAL ASSESSMENT: PAIN_FUNCTIONAL_ASSESSMENT: 0-10

## 2024-11-05 ASSESSMENT — PAIN DESCRIPTION - PAIN TYPE: TYPE: ACUTE PAIN

## 2024-11-05 ASSESSMENT — PAIN SCALES - GENERAL: PAINLEVEL_OUTOF10: 9

## 2024-11-05 ASSESSMENT — PAIN DESCRIPTION - DESCRIPTORS: DESCRIPTORS: CRAMPING

## 2024-11-05 NOTE — ED PROVIDER NOTES
Protein, UA Negative NEG mg/dL    Glucose, Ur Negative NEG mg/dL    Ketones, Urine Negative NEG mg/dL    Bilirubin, Urine Negative NEG      Blood, Urine Negative NEG      Urobilinogen, Urine 1.0 0.2 - 1.0 EU/dL    Nitrite, Urine Negative NEG      Leukocyte Esterase, Urine Negative NEG         Radiologic [place]  CT ABDOMEN PELVIS W IV CONTRAST Additional Contrast? Radiologist Recommendation    (Results Pending)         Procedures     Procedures    ED Course     4:26 PM SILVANA CACERES (Kerry Duarte PA-C) am the first provider for this patient. Initial assessment performed. I reviewed the vital signs, available nursing notes, past medical history, past surgical history, family history and social history. The patients presenting problems have been discussed, and they are in agreement with the care plan formulated and outlined with them.  I have encouraged them to ask questions as they arise throughout their visit.    Records Reviewed: Nursing Notes, Old Medical Records, and GI    After review of chart it appears that patient has been admitted in the past for a GI bleed and had endoscopy done.  Patient does have a significant history of recurrent abdominal pain associate with nausea and vomiting and cocaine abuse    Is this patient to be included in the SEP-1 core measure? Is this patient to be included in the SEP-1 core measure? Yes SEP-1 CORE MEASURE DATA    MEDICATIONS ADMINISTERED IN THE ED:  Medications   piperacillin-tazobactam (ZOSYN) 4,500 mg in sodium chloride 0.9 % 100 mL IVPB (mini-bag) (has no administration in time range)   ondansetron (ZOFRAN) injection 4 mg (4 mg IntraVENous Given 11/5/24 1706)            Medical Decision Making      Differential diagnosis: Intra-abdominal infection intra-abdominal obstruction, urinary tract infection, dyspepsia    49-year-old male presented ambulatory to the ED for generalized abdominal pain associated with vomiting nausea and diarrhea.  On physical exam he is well-appearing

## 2024-11-06 NOTE — DISCHARGE INSTRUCTIONS
Call PCP for follow-up in office this week for abdominal pain and cardiomegaly.   Take home medications as prescribed.   Return to ED for new or worsening, concerning symptoms.

## 2024-11-07 LAB
BACTERIA SPEC CULT: NORMAL
SERVICE CMNT-IMP: NORMAL

## 2024-12-16 ENCOUNTER — HOSPITAL ENCOUNTER (OUTPATIENT)
Facility: HOSPITAL | Age: 49
Discharge: HOME | End: 2024-12-19
Payer: MEDICAID

## 2024-12-16 ENCOUNTER — HOSPITAL ENCOUNTER (OUTPATIENT)
Facility: HOSPITAL | Age: 49
Setting detail: SPECIMEN
Discharge: HOME | End: 2024-12-19
Payer: MEDICAID

## 2024-12-16 ENCOUNTER — TRANSCRIBE ORDERS (OUTPATIENT)
Facility: HOSPITAL | Age: 49
End: 2024-12-16

## 2024-12-16 DIAGNOSIS — G89.29 CHRONIC BILATERAL LOW BACK PAIN WITHOUT SCIATICA: Primary | ICD-10-CM

## 2024-12-16 DIAGNOSIS — G89.29 CHRONIC BILATERAL LOW BACK PAIN WITHOUT SCIATICA: ICD-10-CM

## 2024-12-16 DIAGNOSIS — M54.50 CHRONIC BILATERAL LOW BACK PAIN WITHOUT SCIATICA: Primary | ICD-10-CM

## 2024-12-16 DIAGNOSIS — R05.9 COUGH, UNSPECIFIED TYPE: ICD-10-CM

## 2024-12-16 DIAGNOSIS — M54.50 CHRONIC BILATERAL LOW BACK PAIN WITHOUT SCIATICA: ICD-10-CM

## 2024-12-16 LAB — SENTARA SPECIMEN COLLECTION: NORMAL

## 2024-12-16 PROCEDURE — 72100 X-RAY EXAM L-S SPINE 2/3 VWS: CPT

## 2024-12-16 PROCEDURE — 99001 SPECIMEN HANDLING PT-LAB: CPT

## 2024-12-16 PROCEDURE — 71046 X-RAY EXAM CHEST 2 VIEWS: CPT

## 2024-12-20 ENCOUNTER — OFFICE VISIT (OUTPATIENT)
Age: 49
End: 2024-12-20
Payer: MEDICAID

## 2024-12-20 VITALS
HEIGHT: 73 IN | BODY MASS INDEX: 31.81 KG/M2 | SYSTOLIC BLOOD PRESSURE: 110 MMHG | OXYGEN SATURATION: 93 % | DIASTOLIC BLOOD PRESSURE: 60 MMHG | WEIGHT: 240 LBS | HEART RATE: 76 BPM

## 2024-12-20 DIAGNOSIS — R06.00 DYSPNEA, UNSPECIFIED TYPE: Primary | ICD-10-CM

## 2024-12-20 DIAGNOSIS — E78.5 DYSLIPIDEMIA: ICD-10-CM

## 2024-12-20 DIAGNOSIS — R07.9 CHEST PAIN, UNSPECIFIED TYPE: ICD-10-CM

## 2024-12-20 DIAGNOSIS — I10 PRIMARY HYPERTENSION: ICD-10-CM

## 2024-12-20 DIAGNOSIS — R00.2 PALPITATIONS: ICD-10-CM

## 2024-12-20 DIAGNOSIS — R06.02 SHORTNESS OF BREATH: ICD-10-CM

## 2024-12-20 PROCEDURE — 3078F DIAST BP <80 MM HG: CPT | Performed by: INTERNAL MEDICINE

## 2024-12-20 PROCEDURE — 3074F SYST BP LT 130 MM HG: CPT | Performed by: INTERNAL MEDICINE

## 2024-12-20 PROCEDURE — 99204 OFFICE O/P NEW MOD 45 MIN: CPT | Performed by: INTERNAL MEDICINE

## 2024-12-20 ASSESSMENT — PATIENT HEALTH QUESTIONNAIRE - PHQ9
SUM OF ALL RESPONSES TO PHQ QUESTIONS 1-9: 0
SUM OF ALL RESPONSES TO PHQ9 QUESTIONS 1 & 2: 0
SUM OF ALL RESPONSES TO PHQ QUESTIONS 1-9: 0
1. LITTLE INTEREST OR PLEASURE IN DOING THINGS: NOT AT ALL
SUM OF ALL RESPONSES TO PHQ QUESTIONS 1-9: 0
2. FEELING DOWN, DEPRESSED OR HOPELESS: NOT AT ALL
SUM OF ALL RESPONSES TO PHQ QUESTIONS 1-9: 0

## 2024-12-20 NOTE — PROGRESS NOTES
Dictation on: 12/20/2024 10:22 AM by: SWETHA GORE [[id number]]       Wt Readings from Last 3 Encounters:   12/20/24 108.9 kg (240 lb)   11/05/24 109.9 kg (242 lb 4.8 oz)   08/11/24 108.9 kg (240 lb)     Past Medical History:   Diagnosis Date    ADD (attention deficit disorder)     Depression     Heroin abuse (HCC)     Started age 13 years, on methadone clinic     History of erectile dysfunction     History of nocturia     History of tobacco use     Hypercholesteremia     Hypertension        Current Outpatient Medications   Medication Sig Dispense Refill    ondansetron (ZOFRAN-ODT) 4 MG disintegrating tablet Take 1 tablet by mouth 3 times daily as needed for Nausea or Vomiting 21 tablet 0    ondansetron (ZOFRAN-ODT) 4 MG disintegrating tablet Take 1 tablet by mouth 3 times daily as needed for Nausea or Vomiting 21 tablet 0    famotidine (PEPCID) 20 MG tablet Take 1 tablet by mouth 2 times daily 24 tablet 1    dicyclomine (BENTYL) 10 MG capsule Take 2 capsules by mouth 4 times daily 21 capsule 0    QUEtiapine (SEROQUEL) 50 MG tablet Take 1 tablet by mouth daily 30 tablet 0    cloNIDine (CATAPRES) 0.1 MG tablet Take 1 tablet by mouth 3 times daily 60 tablet 0    ferrous sulfate (FE TABS) 325 (65 Fe) MG EC tablet Take 1 tablet by mouth 2 times daily 60 tablet 0    methadone (DOLOPHINE) 10 MG/ML solution Take 11.5 mLs by mouth daily.      albuterol sulfate HFA (PROVENTIL;VENTOLIN;PROAIR) 108 (90 Base) MCG/ACT inhaler Inhale 2 puffs into the lungs every 4 hours as needed      amLODIPine (NORVASC) 5 MG tablet Take 1 tablet by mouth daily      naloxone 4 MG/0.1ML LIQD nasal spray Use 1 spray intranasally, then discard. Repeat with new spray every 2 min as needed for opioid overdose symptoms, alternating nostrils.      ketorolac (TORADOL) 10 MG tablet Take 1 tablet by mouth every 6 hours as needed for Pain (Patient not taking: Reported on 12/20/2024) 15 tablet 0     No current facility-administered medications for this

## (undated) DEVICE — MEDI-VAC NON-CONDUCTIVE SUCTION TUBING: Brand: CARDINAL HEALTH

## (undated) DEVICE — FORCEPS BX L240CM JAW DIA2.4MM ORNG L CAP W/ NDL DISP RAD

## (undated) DEVICE — SYRINGE MED 50ML LUERSLIP TIP

## (undated) DEVICE — FORCEPS BX L240CM JAW DIA2.8MM L CAP W/ NDL MIC MESH TOOTH

## (undated) DEVICE — UNDERPAD INCONT W23XL36IN STD BLU POLYPR BK FLUF SFT

## (undated) DEVICE — SNARE POLYP M W27MMXL240CM OVL STIFF DISP CAPTIVATOR

## (undated) DEVICE — GOWN ISOL IMPERV UNIV, DISP, OPEN BACK, BLUE --

## (undated) DEVICE — STERILE POLYISOPRENE POWDER-FREE SURGICAL GLOVES: Brand: PROTEXIS

## (undated) DEVICE — GAUZE,SPONGE,4"X4",16PLY,STRL,LF,10/TRAY: Brand: MEDLINE

## (undated) DEVICE — BITE BLOCK ENDOSCP UNIV AD 6 TO 9.4 MM

## (undated) DEVICE — FLUFF AND POLYMER UNDERPAD,EXTRA HEAVY: Brand: WINGS

## (undated) DEVICE — FLEX ADVANTAGE 3000CC: Brand: FLEX ADVANTAGE

## (undated) DEVICE — SYR 10ML LUER LOK 1/5ML GRAD --

## (undated) DEVICE — YANKAUER,SMOOTH HANDLE,HIGH CAPACITY: Brand: MEDLINE INDUSTRIES, INC.

## (undated) DEVICE — SOLUTION IRRIG 1000ML H2O STRL BLT

## (undated) DEVICE — CANNULA ORIG TL CLR W FOAM CUSHIONS AND 14FT SUPL TB 3 CHN

## (undated) DEVICE — BASIN EMSIS 16OZ GRAPHITE PLAS KID SHP MOLD GRAD FOR ORAL

## (undated) DEVICE — SYRINGE MED 25GA 3ML L5/8IN SUBQ PLAS W/ DETACH NDL SFTY

## (undated) DEVICE — AIRLIFE™ NASAL OXYGEN CANNULA CURVED, NONFLARED TIP WITH 14 FOOT (4.3 M) CRUSH-RESISTANT TUBING, OVER-THE-EAR STYLE: Brand: AIRLIFE™

## (undated) DEVICE — SYR 50ML SLIP TIP NSAF LF STRL --

## (undated) DEVICE — MEDI-VAC SUCTION HIGH CAPACITY: Brand: CARDINAL HEALTH

## (undated) DEVICE — CANNULA NSL AD TBNG L14FT STD PVC O2 CRV CONN NONFLARED NSL

## (undated) DEVICE — LINER SUCT CANSTR 3000CC PLAS SFT PRE ASSEMB W/OUT TBNG W/

## (undated) DEVICE — CATHETER SUCT TR FL TIP 14FR W/ O CTRL

## (undated) DEVICE — ENDOSCOPY PUMP TUBING/ CAP SET: Brand: ERBE

## (undated) DEVICE — SYR 20ML LL STRL LF --